# Patient Record
Sex: FEMALE | Race: WHITE | Employment: OTHER | URBAN - METROPOLITAN AREA
[De-identification: names, ages, dates, MRNs, and addresses within clinical notes are randomized per-mention and may not be internally consistent; named-entity substitution may affect disease eponyms.]

---

## 2018-03-13 ENCOUNTER — TELEPHONE (OUTPATIENT)
Dept: ADMINISTRATIVE | Age: 78
End: 2018-03-13

## 2018-03-13 ENCOUNTER — OFFICE VISIT (OUTPATIENT)
Dept: FAMILY MEDICINE CLINIC | Age: 78
End: 2018-03-13
Payer: MEDICARE

## 2018-03-13 VITALS
TEMPERATURE: 99 F | RESPIRATION RATE: 16 BRPM | HEART RATE: 72 BPM | SYSTOLIC BLOOD PRESSURE: 139 MMHG | DIASTOLIC BLOOD PRESSURE: 88 MMHG

## 2018-03-13 DIAGNOSIS — J06.9 VIRAL URI: Primary | ICD-10-CM

## 2018-03-13 PROCEDURE — 99213 OFFICE O/P EST LOW 20 MIN: CPT | Performed by: FAMILY MEDICINE

## 2018-03-13 RX ORDER — NAPROXEN 250 MG/1
250 TABLET ORAL 2 TIMES DAILY WITH MEALS
Qty: 30 TABLET | Refills: 1 | Status: SHIPPED | OUTPATIENT
Start: 2018-03-13 | End: 2018-07-23

## 2018-03-13 ASSESSMENT — PATIENT HEALTH QUESTIONNAIRE - PHQ9
SUM OF ALL RESPONSES TO PHQ9 QUESTIONS 1 & 2: 0
1. LITTLE INTEREST OR PLEASURE IN DOING THINGS: 0
SUM OF ALL RESPONSES TO PHQ QUESTIONS 1-9: 0
2. FEELING DOWN, DEPRESSED OR HOPELESS: 0

## 2018-03-13 NOTE — TELEPHONE ENCOUNTER
Patient was told to call back, she is not able to make it there for the 3pm opening today; sinus infection, referred to [facility] - she will go tomorrow

## 2018-06-06 ENCOUNTER — HOSPITAL ENCOUNTER (OUTPATIENT)
Age: 78
Discharge: HOME | End: 2018-06-06
Payer: MEDICARE

## 2018-06-06 ENCOUNTER — TELEPHONE (OUTPATIENT)
Dept: FAMILY MEDICINE CLINIC | Age: 78
End: 2018-06-06

## 2018-06-06 DIAGNOSIS — Z12.31 SCREENING MAMMOGRAM, ENCOUNTER FOR: Primary | ICD-10-CM

## 2018-06-06 LAB
ALBUMIN SERPL-MCNC: 4.2 G/DL (ref 3.5–5.2)
ALP BLD-CCNC: 91 U/L (ref 35–104)
ALT SERPL-CCNC: 14 U/L (ref 0–32)
ANION GAP SERPL CALCULATED.3IONS-SCNC: 10 MMOL/L (ref 7–16)
AST SERPL-CCNC: 21 U/L (ref 0–31)
BILIRUB SERPL-MCNC: 0.5 MG/DL (ref 0–1.2)
BUN BLDV-MCNC: 11 MG/DL (ref 8–23)
CALCIUM SERPL-MCNC: 9.4 MG/DL (ref 8.6–10.2)
CHLORIDE BLD-SCNC: 102 MMOL/L (ref 98–107)
CO2: 27 MMOL/L (ref 22–29)
CREAT SERPL-MCNC: 0.6 MG/DL (ref 0.5–1)
GFR AFRICAN AMERICAN: >60
GFR NON-AFRICAN AMERICAN: >60 ML/MIN/1.73
GLUCOSE BLD-MCNC: 124 MG/DL (ref 74–109)
HBA1C MFR BLD: 6.1 % (ref 4.8–5.9)
POTASSIUM SERPL-SCNC: 4 MMOL/L (ref 3.5–5)
SODIUM BLD-SCNC: 139 MMOL/L (ref 132–146)
TOTAL PROTEIN: 7.7 G/DL (ref 6.4–8.3)

## 2018-06-06 PROCEDURE — 83036 HEMOGLOBIN GLYCOSYLATED A1C: CPT

## 2018-06-06 PROCEDURE — 80053 COMPREHEN METABOLIC PANEL: CPT

## 2018-06-06 PROCEDURE — 36415 COLL VENOUS BLD VENIPUNCTURE: CPT

## 2018-06-27 ENCOUNTER — HOSPITAL ENCOUNTER (OUTPATIENT)
Dept: MAMMOGRAPHY | Age: 78
Discharge: HOME | End: 2018-06-29
Payer: MEDICARE

## 2018-06-27 ENCOUNTER — HOSPITAL ENCOUNTER (OUTPATIENT)
Age: 78
Discharge: HOME | End: 2018-06-29
Payer: MEDICARE

## 2018-06-27 DIAGNOSIS — Z12.31 SCREENING MAMMOGRAM, ENCOUNTER FOR: ICD-10-CM

## 2018-06-27 PROCEDURE — 77067 SCR MAMMO BI INCL CAD: CPT

## 2018-07-23 ENCOUNTER — OFFICE VISIT (OUTPATIENT)
Dept: FAMILY MEDICINE CLINIC | Age: 78
End: 2018-07-23
Payer: MEDICARE

## 2018-07-23 VITALS
BODY MASS INDEX: 32.44 KG/M2 | RESPIRATION RATE: 16 BRPM | HEART RATE: 76 BPM | SYSTOLIC BLOOD PRESSURE: 124 MMHG | DIASTOLIC BLOOD PRESSURE: 65 MMHG | HEIGHT: 64 IN | WEIGHT: 190 LBS

## 2018-07-23 DIAGNOSIS — E11.9 TYPE 2 DIABETES MELLITUS WITHOUT COMPLICATION, WITHOUT LONG-TERM CURRENT USE OF INSULIN (HCC): Primary | ICD-10-CM

## 2018-07-23 DIAGNOSIS — I10 ESSENTIAL HYPERTENSION: Chronic | ICD-10-CM

## 2018-07-23 DIAGNOSIS — E78.2 MIXED HYPERLIPIDEMIA: Chronic | ICD-10-CM

## 2018-07-23 PROCEDURE — 1036F TOBACCO NON-USER: CPT | Performed by: FAMILY MEDICINE

## 2018-07-23 PROCEDURE — 1101F PT FALLS ASSESS-DOCD LE1/YR: CPT | Performed by: FAMILY MEDICINE

## 2018-07-23 PROCEDURE — 1090F PRES/ABSN URINE INCON ASSESS: CPT | Performed by: FAMILY MEDICINE

## 2018-07-23 PROCEDURE — 1123F ACP DISCUSS/DSCN MKR DOCD: CPT | Performed by: FAMILY MEDICINE

## 2018-07-23 PROCEDURE — G8400 PT W/DXA NO RESULTS DOC: HCPCS | Performed by: FAMILY MEDICINE

## 2018-07-23 PROCEDURE — 4040F PNEUMOC VAC/ADMIN/RCVD: CPT | Performed by: FAMILY MEDICINE

## 2018-07-23 PROCEDURE — 99214 OFFICE O/P EST MOD 30 MIN: CPT | Performed by: FAMILY MEDICINE

## 2018-07-23 PROCEDURE — G8427 DOCREV CUR MEDS BY ELIG CLIN: HCPCS | Performed by: FAMILY MEDICINE

## 2018-07-23 PROCEDURE — G8417 CALC BMI ABV UP PARAM F/U: HCPCS | Performed by: FAMILY MEDICINE

## 2018-07-23 RX ORDER — LOSARTAN POTASSIUM AND HYDROCHLOROTHIAZIDE 12.5; 1 MG/1; MG/1
1 TABLET ORAL DAILY
Qty: 90 TABLET | Refills: 1 | Status: SHIPPED | OUTPATIENT
Start: 2018-07-23 | End: 2018-12-24 | Stop reason: SDUPTHER

## 2018-07-23 ASSESSMENT — PATIENT HEALTH QUESTIONNAIRE - PHQ9
1. LITTLE INTEREST OR PLEASURE IN DOING THINGS: 0
2. FEELING DOWN, DEPRESSED OR HOPELESS: 0
SUM OF ALL RESPONSES TO PHQ9 QUESTIONS 1 & 2: 0
SUM OF ALL RESPONSES TO PHQ QUESTIONS 1-9: 0

## 2018-07-23 NOTE — PROGRESS NOTES
Chief complaint : Daisy Edouard  presents for follow-up of diabetes type 2 , hypertension and hyperlipidemia. Present illness :    Generally feels well. Patient here for scheduled follow-up visit. Diabetes type 2 : no polyuria or polydipsia. No visual changes. No pain or numbness and no unintended weight loss. Taking medications regularly. Glucose levels at home : Not checking  Last eye exam : Done last year    Hypertension : No headaches. No exertional chest pain, dyspnea or edema. Taking medications regularly. Trying to avoid excessive salt intake. reports that she does not drink alcohol. Exercise : Does not exercise regularly. Hyperlipidemia : no myalgias or muscle weakness. Diet : Not watching carefully and gained some weight recently. Medications : As listed    Recent labs reviewed and discussed with patient.     Other concerns : None    Past Medical History:   Diagnosis Date    Arthritis     Bilateral carpal tunnel syndrome     right more than left    Diabetes mellitus (HCC)     diet controlled & cinnamon    DM2 (diabetes mellitus, type 2) ([facility] 2/16/2012    patient states not diabetic, family hx of diabetes; A1C-6.2    Endometrial cancer ([facility] [address] 1992    early small cured    GERD (gastroesophageal reflux disease)     HTN (hypertension) 2/16/2012    Hyperlipidemia     borderline- no meds    Hypertension     controlled    Obesity 5/28/2013    Osteoarthritis     Osteoarthritis of right knee     [place]    Sinus drainage     seasonal (winter)    Spondylosis of lumbosacral joint     Voice absence     only with anxiety       Past Surgical History:   Procedure Laterality Date    COLONOSCOPY  [address]    1 hyperplastic polyp 2mm    HYSTERECTOMY  1990's    [facility] BSO    JOINT REPLACEMENT Right     Knee    OTHER SURGICAL HISTORY  3/11/2015    EGD with Biopsy    TONSILLECTOMY      age 13   [address]  2/2012    right    UPPER GASTROINTESTINAL ENDOSCOPY  08

## 2018-11-06 ENCOUNTER — APPOINTMENT (OUTPATIENT)
Dept: GENERAL RADIOLOGY | Age: 78
End: 2018-11-06
Payer: MEDICARE

## 2018-11-06 ENCOUNTER — HOSPITAL ENCOUNTER (EMERGENCY)
Age: 78
Discharge: HOME | End: 2018-11-06
Payer: MEDICARE

## 2018-11-06 VITALS
BODY MASS INDEX: 31.58 KG/M2 | RESPIRATION RATE: 14 BRPM | WEIGHT: 185 LBS | OXYGEN SATURATION: 97 % | HEART RATE: 68 BPM | HEIGHT: 64 IN | TEMPERATURE: 98.6 F | DIASTOLIC BLOOD PRESSURE: 82 MMHG | SYSTOLIC BLOOD PRESSURE: 196 MMHG
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
## 2018-11-06 DIAGNOSIS — S80.01XA CONTUSION OF RIGHT KNEE, INITIAL ENCOUNTER: ICD-10-CM

## 2018-11-06 DIAGNOSIS — S40.021A CONTUSION OF RIGHT UPPER EXTREMITY, INITIAL ENCOUNTER: ICD-10-CM

## 2018-11-06 PROCEDURE — 73562 X-RAY EXAM OF KNEE 3: CPT

## 2018-11-06 PROCEDURE — 6370000000 HC RX 637 (ALT 250 FOR IP)

## 2018-11-06 PROCEDURE — 73060 X-RAY EXAM OF HUMERUS: CPT

## 2018-11-06 PROCEDURE — 73030 X-RAY EXAM OF SHOULDER: CPT

## 2018-11-06 PROCEDURE — 99284 EMERGENCY DEPT VISIT MOD MDM: CPT

## 2018-11-06 RX ORDER — NAPROXEN 250 MG/1
TABLET ORAL
Status: COMPLETED
Start: 2018-11-06 | End: 2018-11-06

## 2018-11-06 RX ORDER — NAPROXEN 250 MG/1
500 TABLET ORAL ONCE
Status: COMPLETED | OUTPATIENT
Start: 2018-11-06 | End: 2018-11-06

## 2018-11-06 RX ADMIN — NAPROXEN 500 MG: 250 TABLET ORAL at 10:14

## 2018-11-06 ASSESSMENT — PAIN DESCRIPTION - LOCATION: LOCATION: ARM;KNEE

## 2018-11-06 ASSESSMENT — PAIN DESCRIPTION - PAIN TYPE: TYPE: ACUTE PAIN

## 2018-11-06 ASSESSMENT — PAIN DESCRIPTION - ORIENTATION: ORIENTATION: RIGHT

## 2018-11-06 ASSESSMENT — PAIN SCALES - GENERAL: PAINLEVEL_OUTOF10: 10

## 2018-11-06 NOTE — ED NOTES
Pt placed in sling. Workers comp papers discussed with patialvaradon and copies retained for our records       Savanna Valentine.  Synetta Number, RN  11/06/18 [id number]

## 2018-11-06 NOTE — ED PROVIDER NOTES
Heart Disease in her brother and father. The patients home medications have been reviewed. Allergies: Penicillins and Aspirin    -------------------------------------------------- RESULTS -------------------------------------------------  All laboratory and radiology results have been personally reviewed by myself   LABS:  No results found for this visit on 11/06/18. RADIOLOGY:  Interpreted by Radiologist.  XR HUMERUS RIGHT (MIN 2 VIEWS)   Final Result   1. No acute fracture or dislocation. XR SHOULDER RIGHT (MIN 2 VIEWS)   Final Result   1. No acute fracture or dislocation. 2. Mild to moderate degenerative joint disease of the   acromioclavicular and glenohumeral joints. XR KNEE RIGHT (3 VIEWS)   Final Result   1. No acute fracture or dislocation. 2. Total right knee arthroplasty in near anatomic alignment. 3. Diffuse soft tissue swelling anteriorly. ------------------------- NURSING NOTES AND VITALS REVIEWED ---------------------------   The nursing notes within the ED encounter and vital signs as below have been reviewed. BP (!) 196/82   Pulse 68   Temp 98.6 °F (37 °C) (Oral)   Resp 14   Ht 5' 4\" (1.626 m)   Wt 185 lb (83.9 kg)   SpO2 97%   BMI 31.76 kg/m²   Oxygen Saturation Interpretation: Normal      ---------------------------------------------------PHYSICAL EXAM--------------------------------------      Constitutional/General: Alert and oriented x3, well appearing, non toxic in NAD  Head: NC/AT  Eyes: PERRL, EOMI, 3 mm and reactive. No nystagmus. Mouth: Oropharynx clear, handling secretions, no trismus  Neck: Supple, full ROM, no meningeal signs, No tenderness on palpation of midline cervical spine. Pulmonary: Lungs clear to auscultation bilaterally, no wheezes, rales, or rhonchi. Not in respiratory distress, no tenderness on palpation to the anterior lateral chest wall area. Cardiovascular:  Regular rate and rhythm, no murmurs, gallops, or rubs.  2+

## 2018-11-08 ENCOUNTER — OFFICE VISIT (OUTPATIENT)
Dept: FAMILY MEDICINE CLINIC | Age: 78
End: 2018-11-08
Payer: MEDICARE

## 2018-11-08 VITALS
HEIGHT: 64 IN | BODY MASS INDEX: 33.46 KG/M2 | HEART RATE: 69 BPM | DIASTOLIC BLOOD PRESSURE: 72 MMHG | SYSTOLIC BLOOD PRESSURE: 136 MMHG | WEIGHT: 196 LBS | RESPIRATION RATE: 18 BRPM

## 2018-11-08 DIAGNOSIS — M25.511 ACUTE PAIN OF RIGHT SHOULDER: Primary | ICD-10-CM

## 2018-11-08 PROCEDURE — G8400 PT W/DXA NO RESULTS DOC: HCPCS | Performed by: FAMILY MEDICINE

## 2018-11-08 PROCEDURE — 1036F TOBACCO NON-USER: CPT | Performed by: FAMILY MEDICINE

## 2018-11-08 PROCEDURE — G8417 CALC BMI ABV UP PARAM F/U: HCPCS | Performed by: FAMILY MEDICINE

## 2018-11-08 PROCEDURE — 1090F PRES/ABSN URINE INCON ASSESS: CPT | Performed by: FAMILY MEDICINE

## 2018-11-08 PROCEDURE — 4040F PNEUMOC VAC/ADMIN/RCVD: CPT | Performed by: FAMILY MEDICINE

## 2018-11-08 PROCEDURE — G8427 DOCREV CUR MEDS BY ELIG CLIN: HCPCS | Performed by: FAMILY MEDICINE

## 2018-11-08 PROCEDURE — G8482 FLU IMMUNIZE ORDER/ADMIN: HCPCS | Performed by: FAMILY MEDICINE

## 2018-11-08 PROCEDURE — 1123F ACP DISCUSS/DSCN MKR DOCD: CPT | Performed by: FAMILY MEDICINE

## 2018-11-08 PROCEDURE — 1101F PT FALLS ASSESS-DOCD LE1/YR: CPT | Performed by: FAMILY MEDICINE

## 2018-11-08 PROCEDURE — 99213 OFFICE O/P EST LOW 20 MIN: CPT | Performed by: FAMILY MEDICINE

## 2018-11-08 RX ORDER — NAPROXEN 250 MG/1
250 TABLET ORAL 2 TIMES DAILY WITH MEALS
Qty: 30 TABLET | Refills: 1 | Status: SHIPPED | OUTPATIENT
Start: 2018-11-08 | End: 2018-12-31 | Stop reason: SDUPTHER

## 2018-11-08 ASSESSMENT — PATIENT HEALTH QUESTIONNAIRE - PHQ9
SUM OF ALL RESPONSES TO PHQ QUESTIONS 1-9: 0
2. FEELING DOWN, DEPRESSED OR HOPELESS: 0
1. LITTLE INTEREST OR PLEASURE IN DOING THINGS: 0
SUM OF ALL RESPONSES TO PHQ9 QUESTIONS 1 & 2: 0
SUM OF ALL RESPONSES TO PHQ QUESTIONS 1-9: 0

## 2018-11-09 ENCOUNTER — NURSE ONLY (OUTPATIENT)
Dept: FAMILY MEDICINE CLINIC | Age: 78
End: 2018-11-09
Payer: MEDICARE

## 2018-11-09 DIAGNOSIS — M25.511 RIGHT SHOULDER PAIN, UNSPECIFIED CHRONICITY: Primary | ICD-10-CM

## 2018-11-09 PROCEDURE — 96372 THER/PROPH/DIAG INJ SC/IM: CPT | Performed by: FAMILY MEDICINE

## 2018-11-09 RX ORDER — KETOROLAC TROMETHAMINE 30 MG/ML
30 INJECTION, SOLUTION INTRAMUSCULAR; INTRAVENOUS ONCE
Status: COMPLETED | OUTPATIENT
Start: 2018-11-09 | End: 2018-11-09

## 2018-11-09 RX ADMIN — KETOROLAC TROMETHAMINE 30 MG: 30 INJECTION, SOLUTION INTRAMUSCULAR; INTRAVENOUS at 13:21

## 2018-12-31 DIAGNOSIS — M25.511 ACUTE PAIN OF RIGHT SHOULDER: ICD-10-CM

## 2019-01-01 RX ORDER — NAPROXEN 250 MG/1
TABLET ORAL
Qty: 30 TABLET | Refills: 1 | Status: SHIPPED | OUTPATIENT
Start: 2019-01-01 | End: 2019-03-14 | Stop reason: SDUPTHER

## 2019-02-19 ENCOUNTER — HOSPITAL ENCOUNTER (OUTPATIENT)
Age: 79
Discharge: HOME | End: 2019-02-19
Payer: MEDICARE

## 2019-02-19 DIAGNOSIS — E11.9 TYPE 2 DIABETES MELLITUS WITHOUT COMPLICATION, WITHOUT LONG-TERM CURRENT USE OF INSULIN (HCC): ICD-10-CM

## 2019-02-19 LAB
ALBUMIN SERPL-MCNC: 4.2 G/DL (ref 3.5–5.2)
ALP BLD-CCNC: 88 U/L (ref 35–104)
ALT SERPL-CCNC: 14 U/L (ref 0–32)
ANION GAP SERPL CALCULATED.3IONS-SCNC: 12 MMOL/L (ref 7–16)
AST SERPL-CCNC: 19 U/L (ref 0–31)
BILIRUB SERPL-MCNC: 0.6 MG/DL (ref 0–1.2)
BUN BLDV-MCNC: 13 MG/DL (ref 8–23)
CALCIUM SERPL-MCNC: 9.5 MG/DL (ref 8.6–10.2)
CHLORIDE BLD-SCNC: 99 MMOL/L (ref 98–107)
CHOLESTEROL, TOTAL: 207 MG/DL (ref 0–199)
CO2: 24 MMOL/L (ref 22–29)
CREAT SERPL-MCNC: 0.7 MG/DL (ref 0.5–1)
CREATININE URINE: 41 MG/DL (ref 29–226)
GFR AFRICAN AMERICAN: >60
GFR NON-AFRICAN AMERICAN: >60 ML/MIN/1.73
GLUCOSE BLD-MCNC: 127 MG/DL (ref 74–99)
HBA1C MFR BLD: 5.9 % (ref 4–5.6)
HDLC SERPL-MCNC: 42 MG/DL
LDL CHOLESTEROL CALCULATED: 136 MG/DL (ref 0–99)
MICROALBUMIN UR-MCNC: <12 MG/L
MICROALBUMIN/CREAT UR-RTO: ABNORMAL (ref 0–30)
POTASSIUM SERPL-SCNC: 4.1 MMOL/L (ref 3.5–5)
SODIUM BLD-SCNC: 135 MMOL/L (ref 132–146)
TOTAL PROTEIN: 8 G/DL (ref 6.4–8.3)
TRIGL SERPL-MCNC: 147 MG/DL (ref 0–149)
VLDLC SERPL CALC-MCNC: 29 MG/DL

## 2019-02-19 PROCEDURE — 82044 UR ALBUMIN SEMIQUANTITATIVE: CPT

## 2019-02-19 PROCEDURE — 80053 COMPREHEN METABOLIC PANEL: CPT

## 2019-02-19 PROCEDURE — 83036 HEMOGLOBIN GLYCOSYLATED A1C: CPT

## 2019-02-19 PROCEDURE — 82570 ASSAY OF URINE CREATININE: CPT

## 2019-02-19 PROCEDURE — 80061 LIPID PANEL: CPT

## 2019-02-19 PROCEDURE — 36415 COLL VENOUS BLD VENIPUNCTURE: CPT

## 2019-02-26 ENCOUNTER — OFFICE VISIT (OUTPATIENT)
Dept: FAMILY MEDICINE CLINIC | Age: 79
End: 2019-02-26
Payer: MEDICARE

## 2019-02-26 VITALS
HEART RATE: 98 BPM | BODY MASS INDEX: 32.95 KG/M2 | DIASTOLIC BLOOD PRESSURE: 85 MMHG | WEIGHT: 193 LBS | SYSTOLIC BLOOD PRESSURE: 169 MMHG | RESPIRATION RATE: 18 BRPM | HEIGHT: 64 IN

## 2019-02-26 DIAGNOSIS — E78.2 MIXED HYPERLIPIDEMIA: Chronic | ICD-10-CM

## 2019-02-26 DIAGNOSIS — E11.9 TYPE 2 DIABETES MELLITUS WITHOUT COMPLICATION, WITHOUT LONG-TERM CURRENT USE OF INSULIN (HCC): Primary | ICD-10-CM

## 2019-02-26 DIAGNOSIS — I10 ESSENTIAL HYPERTENSION: Chronic | ICD-10-CM

## 2019-02-26 DIAGNOSIS — K21.9 GASTROESOPHAGEAL REFLUX DISEASE WITHOUT ESOPHAGITIS: ICD-10-CM

## 2019-02-26 PROCEDURE — 1101F PT FALLS ASSESS-DOCD LE1/YR: CPT | Performed by: FAMILY MEDICINE

## 2019-02-26 PROCEDURE — 99214 OFFICE O/P EST MOD 30 MIN: CPT | Performed by: FAMILY MEDICINE

## 2019-02-26 PROCEDURE — 1036F TOBACCO NON-USER: CPT | Performed by: FAMILY MEDICINE

## 2019-02-26 PROCEDURE — G8417 CALC BMI ABV UP PARAM F/U: HCPCS | Performed by: FAMILY MEDICINE

## 2019-02-26 PROCEDURE — 1090F PRES/ABSN URINE INCON ASSESS: CPT | Performed by: FAMILY MEDICINE

## 2019-02-26 PROCEDURE — G8482 FLU IMMUNIZE ORDER/ADMIN: HCPCS | Performed by: FAMILY MEDICINE

## 2019-02-26 PROCEDURE — G8400 PT W/DXA NO RESULTS DOC: HCPCS | Performed by: FAMILY MEDICINE

## 2019-02-26 PROCEDURE — G8427 DOCREV CUR MEDS BY ELIG CLIN: HCPCS | Performed by: FAMILY MEDICINE

## 2019-02-26 PROCEDURE — 4040F PNEUMOC VAC/ADMIN/RCVD: CPT | Performed by: FAMILY MEDICINE

## 2019-02-26 PROCEDURE — 1123F ACP DISCUSS/DSCN MKR DOCD: CPT | Performed by: FAMILY MEDICINE

## 2019-02-26 RX ORDER — AMLODIPINE BESYLATE 2.5 MG/1
2.5 TABLET ORAL DAILY
Qty: 30 TABLET | Refills: 3 | Status: SHIPPED | OUTPATIENT
Start: 2019-02-26 | End: 2019-04-30 | Stop reason: ALTCHOICE

## 2019-02-26 RX ORDER — LISINOPRIL AND HYDROCHLOROTHIAZIDE 20; 12.5 MG/1; MG/1
1 TABLET ORAL DAILY
Qty: 30 TABLET | Refills: 5 | Status: SHIPPED | OUTPATIENT
Start: 2019-02-26 | End: 2019-04-30 | Stop reason: ALTCHOICE

## 2019-02-26 ASSESSMENT — PATIENT HEALTH QUESTIONNAIRE - PHQ9
SUM OF ALL RESPONSES TO PHQ QUESTIONS 1-9: 0
SUM OF ALL RESPONSES TO PHQ9 QUESTIONS 1 & 2: 0
SUM OF ALL RESPONSES TO PHQ QUESTIONS 1-9: 0
1. LITTLE INTEREST OR PLEASURE IN DOING THINGS: 0
2. FEELING DOWN, DEPRESSED OR HOPELESS: 0

## 2019-03-18 ENCOUNTER — OFFICE VISIT (OUTPATIENT)
Dept: FAMILY MEDICINE CLINIC | Age: 79
End: 2019-03-18
Payer: MEDICARE

## 2019-03-18 VITALS
DIASTOLIC BLOOD PRESSURE: 77 MMHG | WEIGHT: 193 LBS | HEART RATE: 65 BPM | HEIGHT: 64 IN | SYSTOLIC BLOOD PRESSURE: 135 MMHG | RESPIRATION RATE: 16 BRPM | BODY MASS INDEX: 32.95 KG/M2 | OXYGEN SATURATION: 98 %

## 2019-03-18 DIAGNOSIS — L30.8 OTHER ECZEMA: Primary | ICD-10-CM

## 2019-03-18 PROCEDURE — G8427 DOCREV CUR MEDS BY ELIG CLIN: HCPCS | Performed by: FAMILY MEDICINE

## 2019-03-18 PROCEDURE — 1036F TOBACCO NON-USER: CPT | Performed by: FAMILY MEDICINE

## 2019-03-18 PROCEDURE — G8482 FLU IMMUNIZE ORDER/ADMIN: HCPCS | Performed by: FAMILY MEDICINE

## 2019-03-18 PROCEDURE — G8400 PT W/DXA NO RESULTS DOC: HCPCS | Performed by: FAMILY MEDICINE

## 2019-03-18 PROCEDURE — 4040F PNEUMOC VAC/ADMIN/RCVD: CPT | Performed by: FAMILY MEDICINE

## 2019-03-18 PROCEDURE — 1090F PRES/ABSN URINE INCON ASSESS: CPT | Performed by: FAMILY MEDICINE

## 2019-03-18 PROCEDURE — 1101F PT FALLS ASSESS-DOCD LE1/YR: CPT | Performed by: FAMILY MEDICINE

## 2019-03-18 PROCEDURE — G8417 CALC BMI ABV UP PARAM F/U: HCPCS | Performed by: FAMILY MEDICINE

## 2019-03-18 PROCEDURE — 99213 OFFICE O/P EST LOW 20 MIN: CPT | Performed by: FAMILY MEDICINE

## 2019-03-18 PROCEDURE — 1123F ACP DISCUSS/DSCN MKR DOCD: CPT | Performed by: FAMILY MEDICINE

## 2019-03-18 RX ORDER — TRIAMCINOLONE ACETONIDE OINTMENT USP, 0.05% 0.5 MG/G
OINTMENT TOPICAL 2 TIMES DAILY
Qty: 17 G | Refills: 1 | Status: SHIPPED | OUTPATIENT
Start: 2019-03-18 | End: 2019-03-19 | Stop reason: CLARIF

## 2019-04-30 ENCOUNTER — TELEPHONE (OUTPATIENT)
Dept: FAMILY MEDICINE CLINIC | Age: 79
End: 2019-04-30

## 2019-04-30 ENCOUNTER — OFFICE VISIT (OUTPATIENT)
Dept: FAMILY MEDICINE CLINIC | Age: 79
End: 2019-04-30
Payer: MEDICARE

## 2019-04-30 VITALS
HEART RATE: 85 BPM | BODY MASS INDEX: 33.12 KG/M2 | RESPIRATION RATE: 18 BRPM | DIASTOLIC BLOOD PRESSURE: 88 MMHG | WEIGHT: 194 LBS | HEIGHT: 64 IN | SYSTOLIC BLOOD PRESSURE: 151 MMHG

## 2019-04-30 DIAGNOSIS — R13.19 ESOPHAGEAL DYSPHAGIA: ICD-10-CM

## 2019-04-30 DIAGNOSIS — E78.2 MIXED HYPERLIPIDEMIA: Chronic | ICD-10-CM

## 2019-04-30 DIAGNOSIS — I10 ESSENTIAL HYPERTENSION: Primary | Chronic | ICD-10-CM

## 2019-04-30 DIAGNOSIS — E11.9 TYPE 2 DIABETES MELLITUS WITHOUT COMPLICATION, WITHOUT LONG-TERM CURRENT USE OF INSULIN (HCC): ICD-10-CM

## 2019-04-30 PROCEDURE — G8400 PT W/DXA NO RESULTS DOC: HCPCS | Performed by: FAMILY MEDICINE

## 2019-04-30 PROCEDURE — G8427 DOCREV CUR MEDS BY ELIG CLIN: HCPCS | Performed by: FAMILY MEDICINE

## 2019-04-30 PROCEDURE — 4040F PNEUMOC VAC/ADMIN/RCVD: CPT | Performed by: FAMILY MEDICINE

## 2019-04-30 PROCEDURE — G8417 CALC BMI ABV UP PARAM F/U: HCPCS | Performed by: FAMILY MEDICINE

## 2019-04-30 PROCEDURE — 1090F PRES/ABSN URINE INCON ASSESS: CPT | Performed by: FAMILY MEDICINE

## 2019-04-30 PROCEDURE — 1036F TOBACCO NON-USER: CPT | Performed by: FAMILY MEDICINE

## 2019-04-30 PROCEDURE — 1123F ACP DISCUSS/DSCN MKR DOCD: CPT | Performed by: FAMILY MEDICINE

## 2019-04-30 PROCEDURE — 99214 OFFICE O/P EST MOD 30 MIN: CPT | Performed by: FAMILY MEDICINE

## 2019-04-30 RX ORDER — LISINOPRIL AND HYDROCHLOROTHIAZIDE 25; 20 MG/1; MG/1
1 TABLET ORAL DAILY
Qty: 30 TABLET | Refills: 2 | Status: SHIPPED | OUTPATIENT
Start: 2019-04-30 | End: 2019-05-16 | Stop reason: ALTCHOICE

## 2019-04-30 RX ORDER — AMLODIPINE BESYLATE 2.5 MG/1
2.5 TABLET ORAL DAILY
COMMUNITY
End: 2019-05-16 | Stop reason: SDUPTHER

## 2019-04-30 RX ORDER — OMEPRAZOLE 40 MG/1
40 CAPSULE, DELAYED RELEASE ORAL
Qty: 90 CAPSULE | Refills: 1 | Status: SHIPPED | OUTPATIENT
Start: 2019-04-30 | End: 2019-06-11

## 2019-04-30 ASSESSMENT — PATIENT HEALTH QUESTIONNAIRE - PHQ9
1. LITTLE INTEREST OR PLEASURE IN DOING THINGS: 0
2. FEELING DOWN, DEPRESSED OR HOPELESS: 0
SUM OF ALL RESPONSES TO PHQ9 QUESTIONS 1 & 2: 0
SUM OF ALL RESPONSES TO PHQ QUESTIONS 1-9: 0
SUM OF ALL RESPONSES TO PHQ QUESTIONS 1-9: 0

## 2019-04-30 NOTE — PROGRESS NOTES
Chief complaint : Minnie Winkler  presents for follow-up of hypertension. Present illness :    Doing well. Complains of dysphagia and GERD symptoms  No headaches, exertional chest pain or edema. No visual changes. No dyspnea or cough. No lightheadedness or dizziness. No syncopal episodes. No abdominal pain or change in bowel habits. No intermittent claudication. Taking medications regularly. Exercise : Does not  Low salt diet : Yes  Recent labs reviewed and discussed with patient. Other concerns : No other concerns.     Past Medical History:   Diagnosis Date    Arthritis     Bilateral carpal tunnel syndrome     right more than left    Diabetes mellitus ([address]     diet controlled & cinnamon    DM2 (diabetes mellitus, type 2) ([address] 2/16/2012    patient states not diabetic, family hx of diabetes; A1C-6.2    Endometrial cancer ([facility] [address] 1992    early small cured    GERD (gastroesophageal reflux disease)     HTN (hypertension) 2/16/2012    Hyperlipidemia     borderline- no meds    Hypertension     controlled    Obesity 5/28/2013    Osteoarthritis     Osteoarthritis of right knee     [place]    Sinus drainage     seasonal (winter)    Spondylosis of lumbosacral joint     Voice absence     only with anxiety       Past Surgical History:   Procedure Laterality Date    COLONOSCOPY  [address]    1 hyperplastic polyp 2mm    HYSTERECTOMY  1990's    [facility] BSO    JOINT REPLACEMENT Right     Knee    OTHER SURGICAL HISTORY  3/11/2015    EGD with Biopsy    TONSILLECTOMY      age 13   Stacie Lighter TOTAL KNEE ARTHROPLASTY  2/2012    right    UPPER GASTROINTESTINAL ENDOSCOPY  08       Current Outpatient Medications   Medication Sig Dispense Refill    naproxen (NAPROSYN) 250 MG tablet Take 1 tablet by mouth 2 times daily as needed for Pain 30 tablet 1    lisinopril-hydrochlorothiazide (PRINZIDE;ZESTORETIC) 20-12.5 MG per tablet Take 1 tablet by mouth daily 30 tablet 5    amLODIPine (NORVASC) 2.5 MG tablet Take 1  HDL 02/19/2019 42     LDL Calculated 02/19/2019 136*    VLDL Cholesterol Calcula* 02/19/2019 29     Hemoglobin A1C 02/19/2019 5.9*    Sodium 02/19/2019 135     Potassium 02/19/2019 4.1     Chloride 02/19/2019 99     CO2 02/19/2019 24     Anion Gap 02/19/2019 12     Glucose 02/19/2019 127*    BUN 02/19/2019 13     CREATININE 02/19/2019 0.7     GFR Non- 02/19/2019 >60     GFR  02/19/2019 >60     Calcium 02/19/2019 9.5     Total Protein 02/19/2019 8.0     Alb 02/19/2019 4.2     Total Bilirubin 02/19/2019 0.6     Alkaline Phosphatase 02/19/2019 88     ALT 02/19/2019 14     AST 02/19/2019 19        Assessment / Plan  1. Essential hypertension  BP was high at last visit  Losartan was stopped  Patient started on lisinopril HCT and amlodipine 2.5 mg daily. BP at home usually 130/80  Increase lisinopril HCT to 20/25    2. Mixed hyperlipidemia  Controlled. Continue same    3. Dysphagia  Schedule esophagram    4. GERD  Will likely need EGD  Send script for omeprazole  Follow up in 6 weeks          Encouraged to take medication(s) regularly, follow a low salt diet, limit alcohol intake, follow a low calorie diet and exercise regularly. Future labs ordered. Patient fully informed about the importance of close control of hypertension in order to minimize the risks of accelerated atherosclerosis, heart disease, stroke and renal disease. Encouraged to monitor blood pressure at home and record levels for next visit. Instructed about proper technique to measure blood pressure. Follow-up as scheduled. Schedule earlier appointment if home blood pressure is consistently elevated or if headaches or vision changes. All questions answered.

## 2019-05-16 ENCOUNTER — TELEPHONE (OUTPATIENT)
Dept: FAMILY MEDICINE CLINIC | Age: 79
End: 2019-05-16

## 2019-05-16 RX ORDER — AMLODIPINE BESYLATE 2.5 MG/1
2.5 TABLET ORAL DAILY
Qty: 90 TABLET | Refills: 1 | Status: SHIPPED | OUTPATIENT
Start: 2019-05-16 | End: 2019-12-06 | Stop reason: ALTCHOICE

## 2019-05-16 RX ORDER — LISINOPRIL AND HYDROCHLOROTHIAZIDE 20; 12.5 MG/1; MG/1
1 TABLET ORAL DAILY
Qty: 90 TABLET | Refills: 1 | Status: CANCELLED | OUTPATIENT
Start: 2019-05-16

## 2019-05-16 RX ORDER — LISINOPRIL AND HYDROCHLOROTHIAZIDE 20; 12.5 MG/1; MG/1
1 TABLET ORAL DAILY
Qty: 90 TABLET | Refills: 0 | Status: SHIPPED | OUTPATIENT
Start: 2019-05-16 | End: 2019-05-24 | Stop reason: ALTCHOICE

## 2019-05-16 RX ORDER — LISINOPRIL AND HYDROCHLOROTHIAZIDE 20; 12.5 MG/1; MG/1
1 TABLET ORAL DAILY
COMMUNITY
End: 2019-05-16 | Stop reason: SDUPTHER

## 2019-05-16 NOTE — TELEPHONE ENCOUNTER
Call patient  May resume previous dose of lisinopril  Monitor BP at home and keep a log  Follow up next month as scheduled. Thanks.

## 2019-05-16 NOTE — TELEPHONE ENCOUNTER
Patient called regarding the new increased dose of the Lisinopril_HCTZ. She stopped it due to severe leg cramping she was getting. She went back on the lower dose she had the 20-12.5. She would like to go back on this if possible. Also needs RF on Amlodipine. Please advise if ok to go back on lower dose or if she will need something else.  Would like to go to [facility].

## 2019-05-29 ENCOUNTER — HOSPITAL ENCOUNTER (OUTPATIENT)
Dept: GENERAL RADIOLOGY | Age: 79
Discharge: HOME | End: 2019-05-31
Payer: MEDICARE

## 2019-05-29 DIAGNOSIS — R13.19 ESOPHAGEAL DYSPHAGIA: ICD-10-CM

## 2019-05-29 PROCEDURE — 2500000003 HC RX 250 WO HCPCS: Performed by: RADIOLOGY

## 2019-05-29 PROCEDURE — 6370000000 HC RX 637 (ALT 250 FOR IP): Performed by: RADIOLOGY

## 2019-05-29 PROCEDURE — 74220 X-RAY XM ESOPHAGUS 1CNTRST: CPT

## 2019-05-29 RX ADMIN — BARIUM SULFATE 176 G: 960 POWDER, FOR SUSPENSION ORAL at 11:11

## 2019-05-29 RX ADMIN — BARIUM SULFATE 140 ML: 980 POWDER, FOR SUSPENSION ORAL at 11:11

## 2019-05-29 RX ADMIN — ANTACID/ANTIFLATULENT 1 EACH: 380; 550; 10; 10 GRANULE, EFFERVESCENT ORAL at 11:11

## 2019-06-10 NOTE — PROGRESS NOTES
ARTHROPLASTY  2/2012    right    UPPER GASTROINTESTINAL ENDOSCOPY  08       Current Outpatient Medications   Medication Sig Dispense Refill    lisinopril-hydrochlorothiazide (PRINZIDE;ZESTORETIC) 20-25 MG per tablet TAKE 1 TABLET BY MOUTH EVERY DAY 90 tablet 1    amLODIPine (NORVASC) 2.5 MG tablet Take 1 tablet by mouth daily 90 tablet 1    omeprazole (PRILOSEC) 40 MG delayed release capsule Take 1 capsule by mouth every morning (before breakfast) 90 capsule 1    naproxen (NAPROSYN) 250 MG tablet Take 1 tablet by mouth 2 times daily as needed for Pain 30 tablet 1    Multiple Vitamin (MULTIVITAMIN PO) Take  by mouth. LD 3/6/15       No current facility-administered medications for this visit. Family History   Problem Relation Age of Onset    Diabetes Mother     Heart Disease Brother     Heart Disease Father        Allergies :  Penicillins and Aspirin      Review of systems :    No headaches, no visual disturbances. No weakness, no numbness. No depression, no anxiety. No rhinorrhea. No sore throat, no hearing loss. No cough, no dyspnea. No chest pain on exertion. No edema. No intermittent claudication. No abdominal pain. nausea, no change in bowel habits. No joint pain, no swelling. No myalgias . No foot pain or sores. No skin lesions, no rash. No heat or cold intolerance. No weight changes. No flank pain, no hematuria. No genital lesions or abnormalities. Physical examination :  Blood pressure 135/83, pulse 75, resp. rate 16, height 5' 4\" (1.626 m), weight 193 lb (87.5 kg). GA: alert oriented x 3, no distress. HEENT : unremarkable. Neck : supple, no masses. Thyroid : no goiter. Lymph nodes : No cervical or supraclavicular lymphadenopathy. Carotids : no  bruits. Chest : no deformities. Lungs : clear, no wheezing or crackles. No consolidation. Heart : regular rate and rhythm, normal S1S2 . No murmurs. Abdomen : soft, no masses. No hepatomegaly  or splenomegaly.  No bruits. Extremities : no edema. Normal peripheral pulses. Skin : no suspicious lesions or rash. Musculoskeletal : no gross deformities. Feet : no lesions or deformities. Normal sensations to touch, monofilament, cold and vibration. Neurological : normal gait and balance. Normal speech. No focal deficits. Psychiatric : well groomed. Normal thought content. Mood is normal and affect is congruent. BMI was elevated today, and weight loss plan recommended is : conventional weight loss and daily exercise regimen. No visits with results within 3 Month(s) from this visit. Latest known visit with results is:   Hospital Outpatient Visit on 02/19/2019   Component Date Value    Microalbumin, Random Uri* 02/19/2019 <12.0     Creatinine, Ur 02/19/2019 41     Microalbumin Creatinine * 02/19/2019 -*    Cholesterol, Total 02/19/2019 207*    Triglycerides 02/19/2019 147     HDL 02/19/2019 42     LDL Calculated 02/19/2019 136*    VLDL Cholesterol Calcula* 02/19/2019 29     Hemoglobin A1C 02/19/2019 5.9*    Sodium 02/19/2019 135     Potassium 02/19/2019 4.1     Chloride 02/19/2019 99     CO2 02/19/2019 24     Anion Gap 02/19/2019 12     Glucose 02/19/2019 127*    BUN 02/19/2019 13     CREATININE 02/19/2019 0.7     GFR Non- 02/19/2019 >60     GFR  02/19/2019 >60     Calcium 02/19/2019 9.5     Total Protein 02/19/2019 8.0     Alb 02/19/2019 4.2     Total Bilirubin 02/19/2019 0.6     Alkaline Phosphatase 02/19/2019 88     ALT 02/19/2019 14     AST 02/19/2019 19        Assessment / Plan :      1. Essential hypertension  BP at home 130-140/80  Having side effects with lisinopril and would like to stop it. Also did not tolerate 5/10 in the past due to side effects but did well on Diovan. diovan HCT    2. Mixed hyperlipidemia  Monitor regular basis    3. Type 2 diabetes mellitus without complication, without long-term current use of insulin (HCC)  Very well controlled.

## 2019-06-11 ENCOUNTER — OFFICE VISIT (OUTPATIENT)
Dept: FAMILY MEDICINE CLINIC | Age: 79
End: 2019-06-11
Payer: MEDICARE

## 2019-06-11 ENCOUNTER — TELEPHONE (OUTPATIENT)
Dept: FAMILY MEDICINE CLINIC | Age: 79
End: 2019-06-11

## 2019-06-11 VITALS
HEIGHT: 64 IN | RESPIRATION RATE: 16 BRPM | SYSTOLIC BLOOD PRESSURE: 135 MMHG | WEIGHT: 193 LBS | BODY MASS INDEX: 32.95 KG/M2 | DIASTOLIC BLOOD PRESSURE: 83 MMHG | HEART RATE: 75 BPM

## 2019-06-11 DIAGNOSIS — E78.2 MIXED HYPERLIPIDEMIA: Chronic | ICD-10-CM

## 2019-06-11 DIAGNOSIS — E11.9 TYPE 2 DIABETES MELLITUS WITHOUT COMPLICATION, WITHOUT LONG-TERM CURRENT USE OF INSULIN (HCC): ICD-10-CM

## 2019-06-11 DIAGNOSIS — I10 ESSENTIAL HYPERTENSION: Primary | Chronic | ICD-10-CM

## 2019-06-11 DIAGNOSIS — K21.9 GASTROESOPHAGEAL REFLUX DISEASE WITHOUT ESOPHAGITIS: ICD-10-CM

## 2019-06-11 PROCEDURE — G8427 DOCREV CUR MEDS BY ELIG CLIN: HCPCS | Performed by: FAMILY MEDICINE

## 2019-06-11 PROCEDURE — G8417 CALC BMI ABV UP PARAM F/U: HCPCS | Performed by: FAMILY MEDICINE

## 2019-06-11 PROCEDURE — 1036F TOBACCO NON-USER: CPT | Performed by: FAMILY MEDICINE

## 2019-06-11 PROCEDURE — 4040F PNEUMOC VAC/ADMIN/RCVD: CPT | Performed by: FAMILY MEDICINE

## 2019-06-11 PROCEDURE — 1090F PRES/ABSN URINE INCON ASSESS: CPT | Performed by: FAMILY MEDICINE

## 2019-06-11 PROCEDURE — 1123F ACP DISCUSS/DSCN MKR DOCD: CPT | Performed by: FAMILY MEDICINE

## 2019-06-11 PROCEDURE — G8400 PT W/DXA NO RESULTS DOC: HCPCS | Performed by: FAMILY MEDICINE

## 2019-06-11 PROCEDURE — 99214 OFFICE O/P EST MOD 30 MIN: CPT | Performed by: FAMILY MEDICINE

## 2019-06-11 RX ORDER — VALSARTAN AND HYDROCHLOROTHIAZIDE 80; 12.5 MG/1; MG/1
1 TABLET, FILM COATED ORAL DAILY
Qty: 90 TABLET | Refills: 1 | Status: SHIPPED | OUTPATIENT
Start: 2019-06-11 | End: 2019-06-24

## 2019-06-12 ENCOUNTER — TELEPHONE (OUTPATIENT)
Dept: FAMILY MEDICINE CLINIC | Age: 79
End: 2019-06-12

## 2019-06-12 NOTE — TELEPHONE ENCOUNTER
Pharmacy called and had a question on the medication sent yesterday. It was sent as generic but in the notes said \"No generic\". Please advise if needs to be brand.

## 2019-06-13 RX ORDER — VALSARTAN/HYDROCHLOROTHIAZIDE 80-12.5MG
1 TABLET ORAL DAILY
COMMUNITY
End: 2019-09-12

## 2019-06-17 ENCOUNTER — HOSPITAL ENCOUNTER (OUTPATIENT)
Age: 79
Discharge: HOME | End: 2019-06-17
Payer: MEDICARE

## 2019-06-17 ENCOUNTER — TELEPHONE (OUTPATIENT)
Dept: FAMILY MEDICINE CLINIC | Age: 79
End: 2019-06-17

## 2019-06-17 DIAGNOSIS — I10 ESSENTIAL HYPERTENSION: Chronic | ICD-10-CM

## 2019-06-17 DIAGNOSIS — E78.2 MIXED HYPERLIPIDEMIA: Chronic | ICD-10-CM

## 2019-06-17 DIAGNOSIS — E11.9 TYPE 2 DIABETES MELLITUS WITHOUT COMPLICATION, WITHOUT LONG-TERM CURRENT USE OF INSULIN (HCC): ICD-10-CM

## 2019-06-17 LAB
ALBUMIN SERPL-MCNC: 4.1 G/DL (ref 3.5–5.2)
ALP BLD-CCNC: 87 U/L (ref 35–104)
ALT SERPL-CCNC: 15 U/L (ref 0–32)
ANION GAP SERPL CALCULATED.3IONS-SCNC: 11 MMOL/L (ref 7–16)
AST SERPL-CCNC: 21 U/L (ref 0–31)
BILIRUB SERPL-MCNC: 0.4 MG/DL (ref 0–1.2)
BUN BLDV-MCNC: 10 MG/DL (ref 8–23)
CALCIUM SERPL-MCNC: 9.1 MG/DL (ref 8.6–10.2)
CHLORIDE BLD-SCNC: 102 MMOL/L (ref 98–107)
CHOLESTEROL, TOTAL: 178 MG/DL (ref 0–199)
CO2: 24 MMOL/L (ref 22–29)
CREAT SERPL-MCNC: 0.6 MG/DL (ref 0.5–1)
CREATININE URINE: 82 MG/DL (ref 29–226)
GFR AFRICAN AMERICAN: >60
GFR NON-AFRICAN AMERICAN: >60 ML/MIN/1.73
GLUCOSE BLD-MCNC: 136 MG/DL (ref 74–99)
HBA1C MFR BLD: 6.2 % (ref 4–5.6)
HDLC SERPL-MCNC: 34 MG/DL
LDL CHOLESTEROL CALCULATED: 111 MG/DL (ref 0–99)
MICROALBUMIN UR-MCNC: 13.5 MG/L
MICROALBUMIN/CREAT UR-RTO: 16.5 (ref 0–30)
POTASSIUM SERPL-SCNC: 4.3 MMOL/L (ref 3.5–5)
SODIUM BLD-SCNC: 137 MMOL/L (ref 132–146)
TOTAL PROTEIN: 7.3 G/DL (ref 6.4–8.3)
TRIGL SERPL-MCNC: 167 MG/DL (ref 0–149)
VLDLC SERPL CALC-MCNC: 33 MG/DL

## 2019-06-17 PROCEDURE — 82044 UR ALBUMIN SEMIQUANTITATIVE: CPT

## 2019-06-17 PROCEDURE — 80053 COMPREHEN METABOLIC PANEL: CPT

## 2019-06-17 PROCEDURE — 83036 HEMOGLOBIN GLYCOSYLATED A1C: CPT

## 2019-06-17 PROCEDURE — 82570 ASSAY OF URINE CREATININE: CPT

## 2019-06-17 PROCEDURE — 36415 COLL VENOUS BLD VENIPUNCTURE: CPT

## 2019-06-17 PROCEDURE — 80061 LIPID PANEL: CPT

## 2019-06-24 ENCOUNTER — OFFICE VISIT (OUTPATIENT)
Dept: FAMILY MEDICINE CLINIC | Age: 79
End: 2019-06-24
Payer: MEDICARE

## 2019-06-24 VITALS
HEART RATE: 81 BPM | SYSTOLIC BLOOD PRESSURE: 151 MMHG | RESPIRATION RATE: 16 BRPM | DIASTOLIC BLOOD PRESSURE: 68 MMHG | BODY MASS INDEX: 32.78 KG/M2 | WEIGHT: 192 LBS | HEIGHT: 64 IN

## 2019-06-24 DIAGNOSIS — E11.9 TYPE 2 DIABETES MELLITUS WITHOUT COMPLICATION, WITHOUT LONG-TERM CURRENT USE OF INSULIN (HCC): ICD-10-CM

## 2019-06-24 DIAGNOSIS — Z00.00 ROUTINE GENERAL MEDICAL EXAMINATION AT A HEALTH CARE FACILITY: ICD-10-CM

## 2019-06-24 DIAGNOSIS — I10 ESSENTIAL HYPERTENSION: Primary | Chronic | ICD-10-CM

## 2019-06-24 PROCEDURE — 4040F PNEUMOC VAC/ADMIN/RCVD: CPT | Performed by: FAMILY MEDICINE

## 2019-06-24 PROCEDURE — 1123F ACP DISCUSS/DSCN MKR DOCD: CPT | Performed by: FAMILY MEDICINE

## 2019-06-24 PROCEDURE — G0438 PPPS, INITIAL VISIT: HCPCS | Performed by: FAMILY MEDICINE

## 2019-06-24 ASSESSMENT — LIFESTYLE VARIABLES: HOW OFTEN DO YOU HAVE A DRINK CONTAINING ALCOHOL: 0

## 2019-06-24 ASSESSMENT — PATIENT HEALTH QUESTIONNAIRE - PHQ9
SUM OF ALL RESPONSES TO PHQ QUESTIONS 1-9: 0
SUM OF ALL RESPONSES TO PHQ QUESTIONS 1-9: 0

## 2019-06-24 ASSESSMENT — ANXIETY QUESTIONNAIRES: GAD7 TOTAL SCORE: 1

## 2019-06-24 NOTE — PROGRESS NOTES
Medicare Annual Wellness Visit  Name: Richardean Sandhoff Date: 2019   MRN: [id number] Sex: Female   Age: 67 y.o. Ethnicity: Non-/Non    : 1940 Race: White      Janny Acosta is here for wellness visit  BP at home is well controlled at 120-130/80  Vaccines reviewed and up to date. Scheduled for colonoscopy soon  Up to date on mammogram   Does not need pap due to age. Screenings for behavioral, psychosocial and functional/safety risks, and cognitive dysfunction are all negative except as indicated below. These results, as well as other patient data from the [address] form, are documented in Flowsheets linked to this Encounter. Allergies   Allergen Reactions    Penicillins Anaphylaxis    Aspirin Other (See Comments)     Severe nose bleed     Prior to Visit Medications    Medication Sig Taking? Authorizing Provider   DIOVAN HCT 80-12.5 MG per tablet Take 1 tablet by mouth daily  Historical Provider, MD   valsartan-hydrochlorothiazide (DIOVAN HCT) 80-12.5 MG per tablet Take 1 tablet by mouth daily No generic please  Caryn Méndez MD   amLODIPine (NORVASC) 2.5 MG tablet Take 1 tablet by mouth daily  Caryn Méndez MD   naproxen (NAPROSYN) 250 MG tablet Take 1 tablet by mouth 2 times daily as needed for Pain  Caryn Méndez MD   Multiple Vitamin (MULTIVITAMIN PO) Take  by mouth.  LD 3/6/15  Historical Provider, MD     Past Medical History:   Diagnosis Date    Arthritis     Bilateral carpal tunnel syndrome     right more than left    Diabetes mellitus ([facility] [address]     diet controlled & cinnamon    DM2 (diabetes mellitus, type 2) ([address] 2012    patient states not diabetic, family hx of diabetes; A1C-6.2    Endometrial cancer ([address]     early small cured    GERD (gastroesophageal reflux disease)     HTN (hypertension) 2012    Hyperlipidemia     borderline- no meds    Hypertension     controlled    Obesity 2013    Osteoarthritis     form, are documented in Flowsheets linked to this Encounter. Allergies   Allergen Reactions    Penicillins Anaphylaxis    Aspirin Other (See Comments)     Severe nose bleed     Prior to Visit Medications    Medication Sig Taking? Authorizing Provider   ASTRID HCT 80-12.5 MG per tablet Take 1 tablet by mouth daily  Historical Provider, MD   amLODIPine (NORVASC) 2.5 MG tablet Take 1 tablet by mouth daily  Chay Price MD   naproxen (NAPROSYN) 250 MG tablet Take 1 tablet by mouth 2 times daily as needed for Pain  Chay Price MD   Multiple Vitamin (MULTIVITAMIN PO) Take  by mouth.  LD 3/6/15  Historical Provider, MD     Past Medical History:   Diagnosis Date    Arthritis     Bilateral carpal tunnel syndrome     right more than left    Diabetes mellitus ([facility]     diet controlled & cinnamon    DM2 (diabetes mellitus, type 2) ([facility] [address] 2/16/2012    patient states not diabetic, family hx of diabetes; A1C-6.2    Endometrial cancer ([facility] [address] 1992    early small cured    GERD (gastroesophageal reflux disease)     HTN (hypertension) 2/16/2012    Hyperlipidemia     borderline- no meds    Hypertension     controlled    Obesity 5/28/2013    Osteoarthritis     Osteoarthritis of right knee     [place]    Sinus drainage     seasonal (winter)    Spondylosis of lumbosacral joint     Voice absence     only with anxiety     Past Surgical History:   Procedure Laterality Date    COLONOSCOPY  [address]    1 hyperplastic polyp 2mm    HYSTERECTOMY  1990's    [facility] BSO    JOINT REPLACEMENT Right     Knee    OTHER SURGICAL HISTORY  3/11/2015    EGD with Biopsy    TONSILLECTOMY      age 13    TOTAL KNEE ARTHROPLASTY  2/2012    right    UPPER GASTROINTESTINAL ENDOSCOPY  08     Family History   Problem Relation Age of Onset    Diabetes Mother     Heart Disease Brother     Heart Disease Father        CareTeam (Including outside providers/suppliers regularly involved in providing care):   Patient Care Team:  Chay Price MD as PCP - General (Family Medicine)  Milagro Dumont MD as PCP - [facility] Empaneled Provider    Wt Readings from Last 3 Encounters:   06/24/19 192 lb (87.1 kg)   06/11/19 193 lb (87.5 kg)   04/30/19 194 lb (88 kg)     Vitals:    06/24/19 1450 06/24/19 1502   BP: (!) 166/88 (!) 151/68   Pulse: 81    Resp: 16    Weight: 192 lb (87.1 kg)    Height: 5' 4\" (1.626 m)      Body mass index is 32.96 kg/m². Based upon direct observation of the patient, evaluation of cognition reveals recent and remote memory intact. Exam as noted above. Patient's complete Health Risk Assessment and screening values have been reviewed and are found in Flowsheets. The following problems were reviewed today and where indicated follow up appointments were made and/or referrals ordered. Positive Risk Factor Screenings with Interventions:     Health Habits/Nutrition:  Health Habits/Nutrition  Do you exercise for at least 20 minutes 2-3 times per week?: Yes  Have you lost any weight without trying in the past 3 months?: No  Do you eat fewer than 2 meals per day?: No  Have you seen a dentist within the past year?: Yes  Body mass index is 32.96 kg/m².   Health Habits/Nutrition Interventions:  · Inadequate physical activity:  patient agrees to exercise for at least 150 minutes/week    Hearing/Vision:  Hearing/Vision  Do you or your family notice any trouble with your hearing?: (!) Yes  Do you have difficulty driving, watching TV, or doing any of your daily activities because of your eyesight?: No  Have you had an eye exam within the past year?: Yes  Hearing/Vision Interventions:  · None    Safety:  Safety  Do you have working smoke detectors?: Yes  Have all throw rugs been removed or fastened?: (!) No  Do you have non-slip mats in all bathtubs?: Yes  Do all of your stairways have a railing or banister?: Yes  Are your doorways, halls and stairs free of clutter?: Yes  Do you always fasten your seatbelt when you are in a car?: Yes  Safety Interventions:  · Home safety tips provided    Personalized Preventive Plan   Current Health Maintenance Status  Immunization History   Administered Date(s) Administered    Influenza 10/08/2013    Influenza Vaccine, unspecified formulation 10/01/2016    Influenza Virus Vaccine 10/01/2014    Influenza, High Dose (Fluzone 65 yrs and older) 10/12/2015, 10/01/2018    Pneumococcal Conjugate 13-valent ([id number]) 07/29/2015    Pneumococcal Polysaccharide ([id number]) 10/08/2013    Tdap (Boostrix, Adacel) 11/29/2016    Zoster Live (Zostavax) 01/01/2013        Health Maintenance   Topic Date Due    Shingles Vaccine (2 of 3) 02/26/2013    Potassium monitoring  06/17/2020    Creatinine monitoring  06/17/2020    DTaP/Tdap/Td vaccine (2 - Td) 11/29/2026    Flu vaccine  Completed    Pneumococcal 65+ years Vaccine  Completed    DEXA (modify frequency per FRAX score)  Addressed     Recommendations for Preventive Services Due: see orders and patient instructions/AVS.  .   Recommended screening schedule for the next 5-10 years is provided to the patient in written form: see Patient Instructions/AVS.

## 2019-09-12 RX ORDER — VALSARTAN/HYDROCHLOROTHIAZIDE 80-12.5MG
TABLET ORAL
Qty: 30 TABLET | Refills: 5 | Status: SHIPPED | OUTPATIENT
Start: 2019-09-12 | End: 2019-11-25 | Stop reason: SDUPTHER

## 2019-09-16 ENCOUNTER — HOSPITAL ENCOUNTER (OUTPATIENT)
Age: 79
Discharge: HOME | End: 2019-09-18

## 2019-09-16 PROCEDURE — 87081 CULTURE SCREEN ONLY: CPT

## 2019-09-16 PROCEDURE — 88305 TISSUE EXAM BY PATHOLOGIST: CPT

## 2019-09-17 LAB — CLOTEST: NORMAL

## 2019-11-25 RX ORDER — VALSARTAN/HYDROCHLOROTHIAZIDE 80-12.5MG
TABLET ORAL
Qty: 90 TABLET | Refills: 1 | Status: SHIPPED | OUTPATIENT
Start: 2019-11-25 | End: 2020-01-27 | Stop reason: SDUPTHER

## 2019-12-04 ENCOUNTER — TELEPHONE (OUTPATIENT)
Dept: FAMILY MEDICINE CLINIC | Age: 79
End: 2019-12-04

## 2019-12-06 ENCOUNTER — OFFICE VISIT (OUTPATIENT)
Dept: FAMILY MEDICINE CLINIC | Age: 79
End: 2019-12-06
Payer: MEDICARE

## 2019-12-06 DIAGNOSIS — M54.2 NECK PAIN: Primary | ICD-10-CM

## 2019-12-06 PROCEDURE — G8399 PT W/DXA RESULTS DOCUMENT: HCPCS | Performed by: FAMILY MEDICINE

## 2019-12-06 PROCEDURE — 1036F TOBACCO NON-USER: CPT | Performed by: FAMILY MEDICINE

## 2019-12-06 PROCEDURE — 1090F PRES/ABSN URINE INCON ASSESS: CPT | Performed by: FAMILY MEDICINE

## 2019-12-06 PROCEDURE — G8482 FLU IMMUNIZE ORDER/ADMIN: HCPCS | Performed by: FAMILY MEDICINE

## 2019-12-06 PROCEDURE — G8417 CALC BMI ABV UP PARAM F/U: HCPCS | Performed by: FAMILY MEDICINE

## 2019-12-06 PROCEDURE — 99213 OFFICE O/P EST LOW 20 MIN: CPT | Performed by: FAMILY MEDICINE

## 2019-12-06 PROCEDURE — 4040F PNEUMOC VAC/ADMIN/RCVD: CPT | Performed by: FAMILY MEDICINE

## 2019-12-06 PROCEDURE — 1123F ACP DISCUSS/DSCN MKR DOCD: CPT | Performed by: FAMILY MEDICINE

## 2019-12-06 PROCEDURE — G8427 DOCREV CUR MEDS BY ELIG CLIN: HCPCS | Performed by: FAMILY MEDICINE

## 2019-12-06 RX ORDER — CYCLOBENZAPRINE HCL 5 MG
5 TABLET ORAL 2 TIMES DAILY PRN
Qty: 15 TABLET | Refills: 0 | Status: SHIPPED | OUTPATIENT
Start: 2019-12-06 | End: 2020-01-05

## 2019-12-08 VITALS
DIASTOLIC BLOOD PRESSURE: 83 MMHG | HEIGHT: 64 IN | WEIGHT: 188 LBS | RESPIRATION RATE: 16 BRPM | SYSTOLIC BLOOD PRESSURE: 135 MMHG | BODY MASS INDEX: 32.1 KG/M2 | HEART RATE: 80 BPM

## 2019-12-08 ASSESSMENT — PATIENT HEALTH QUESTIONNAIRE - PHQ9
2. FEELING DOWN, DEPRESSED OR HOPELESS: 0
SUM OF ALL RESPONSES TO PHQ QUESTIONS 1-9: 0
SUM OF ALL RESPONSES TO PHQ9 QUESTIONS 1 & 2: 0
SUM OF ALL RESPONSES TO PHQ QUESTIONS 1-9: 0
1. LITTLE INTEREST OR PLEASURE IN DOING THINGS: 0

## 2019-12-30 ENCOUNTER — HOSPITAL ENCOUNTER (OUTPATIENT)
Age: 79
Discharge: HOME | End: 2019-12-30
Payer: MEDICARE

## 2019-12-30 DIAGNOSIS — E11.9 TYPE 2 DIABETES MELLITUS WITHOUT COMPLICATION, WITHOUT LONG-TERM CURRENT USE OF INSULIN (HCC): ICD-10-CM

## 2019-12-30 DIAGNOSIS — I10 ESSENTIAL HYPERTENSION: Chronic | ICD-10-CM

## 2019-12-30 LAB
ALBUMIN SERPL-MCNC: 3.7 G/DL (ref 3.5–5.2)
ALP BLD-CCNC: 98 U/L (ref 35–104)
ALT SERPL-CCNC: 11 U/L (ref 0–32)
ANION GAP SERPL CALCULATED.3IONS-SCNC: 13 MMOL/L (ref 7–16)
AST SERPL-CCNC: 16 U/L (ref 0–31)
BILIRUB SERPL-MCNC: 0.3 MG/DL (ref 0–1.2)
BUN BLDV-MCNC: 11 MG/DL (ref 8–23)
CALCIUM SERPL-MCNC: 9.6 MG/DL (ref 8.6–10.2)
CHLORIDE BLD-SCNC: 100 MMOL/L (ref 98–107)
CHOLESTEROL, TOTAL: 155 MG/DL (ref 0–199)
CO2: 25 MMOL/L (ref 22–29)
CREAT SERPL-MCNC: 0.8 MG/DL (ref 0.5–1)
CREATININE URINE: 54 MG/DL (ref 29–226)
GFR AFRICAN AMERICAN: >60
GFR NON-AFRICAN AMERICAN: >60 ML/MIN/1.73
GLUCOSE BLD-MCNC: 138 MG/DL (ref 74–99)
HBA1C MFR BLD: 6.4 % (ref 4–5.6)
HDLC SERPL-MCNC: 30 MG/DL
LDL CHOLESTEROL CALCULATED: 93 MG/DL (ref 0–99)
MICROALBUMIN UR-MCNC: <12 MG/L
MICROALBUMIN/CREAT UR-RTO: ABNORMAL (ref 0–30)
POTASSIUM SERPL-SCNC: 3.8 MMOL/L (ref 3.5–5)
SODIUM BLD-SCNC: 138 MMOL/L (ref 132–146)
TOTAL PROTEIN: 7.8 G/DL (ref 6.4–8.3)
TRIGL SERPL-MCNC: 159 MG/DL (ref 0–149)
VLDLC SERPL CALC-MCNC: 32 MG/DL

## 2019-12-30 PROCEDURE — 83036 HEMOGLOBIN GLYCOSYLATED A1C: CPT

## 2019-12-30 PROCEDURE — 80053 COMPREHEN METABOLIC PANEL: CPT

## 2019-12-30 PROCEDURE — 80061 LIPID PANEL: CPT

## 2019-12-30 PROCEDURE — 82570 ASSAY OF URINE CREATININE: CPT

## 2019-12-30 PROCEDURE — 82044 UR ALBUMIN SEMIQUANTITATIVE: CPT

## 2019-12-30 PROCEDURE — 36415 COLL VENOUS BLD VENIPUNCTURE: CPT

## 2020-01-06 NOTE — PROGRESS NOTES
GASTROINTESTINAL ENDOSCOPY  08       Current Outpatient Medications   Medication Sig Dispense Refill    DIOVAN HCT 80-12.5 MG per tablet TAKE 1 TABLET BY MOUTH  DAILY 90 tablet 1    naproxen (NAPROSYN) 250 MG tablet Take 1 tablet by mouth 2 times daily as needed for Pain 30 tablet 1    Multiple Vitamin (MULTIVITAMIN PO) Take  by mouth. LD 3/6/15       No current facility-administered medications for this visit. Family History   Problem Relation Age of Onset    Diabetes Mother     Heart Disease Brother     Heart Disease Father        Allergies :  Penicillins and Aspirin      Review of systems :    No headaches, no visual disturbances. No weakness, no numbness. No depression, no anxiety. No rhinorrhea. No sore throat, no hearing loss. No cough,  dyspnea. No chest pain on exertion. No edema. No intermittent claudication. No abdominal pain. No nausea, no change in bowel habits. No joint pain, no swelling. No myalgias . No foot pain or sores. No skin lesions, no rash. No heat or cold intolerance. No weight changes. No flank pain, no hematuria. No genital lesions or abnormalities. Physical examination :  Blood pressure 125/82, pulse 116, resp. rate 18, height 5' 4\" (1.626 m), weight 180 lb (81.6 kg). GA: alert oriented x 3, no distress. HEENT : unremarkable. Neck : supple, no masses. Thyroid : no goiter. Lymph nodes : No cervical or supraclavicular lymphadenopathy. Carotids : no  bruits. Chest : no deformities. Lungs : clear, no wheezing or crackles. No consolidation. Heart : Irregularly irregular, normal S1S2 . No murmurs. Abdomen : soft, no masses. No hepatomegaly  or splenomegaly. No bruits. Extremities : no edema. Normal peripheral pulses. Skin : no suspicious lesions or rash. Musculoskeletal : no gross deformities. Feet : no lesions or deformities. Normal sensations to touch, monofilament, cold and vibration. Neurological : normal gait and balance. Normal speech.  No focal deficits. Psychiatric : well groomed. Normal thought content. Mood is normal and affect is congruent. BMI was elevated today, and weight loss plan recommended is : conventional weight loss and daily exercise regimen. Hospital Outpatient Visit on 12/30/2019   Component Date Value    Microalbumin, Random Uri* 12/30/2019 <12.0     Creatinine, Ur 12/30/2019 54     Microalbumin Creatinine * 12/30/2019 -*    Cholesterol, Total 12/30/2019 155     Triglycerides 12/30/2019 159*    HDL 12/30/2019 30     LDL Calculated 12/30/2019 93     VLDL Cholesterol Calcula* 12/30/2019 32     Hemoglobin A1C 12/30/2019 6.4*    Sodium 12/30/2019 138     Potassium 12/30/2019 3.8     Chloride 12/30/2019 100     CO2 12/30/2019 25     Anion Gap 12/30/2019 13     Glucose 12/30/2019 138*    BUN 12/30/2019 11     CREATININE 12/30/2019 0.8     GFR Non- 12/30/2019 >60     GFR  12/30/2019 >60     Calcium 12/30/2019 9.6     Total Protein 12/30/2019 7.8     Alb 12/30/2019 3.7     Total Bilirubin 12/30/2019 0.3     Alkaline Phosphatase 12/30/2019 98     ALT 12/30/2019 11     AST 12/30/2019 16        Assessment / Plan:      1. Essential hypertension  Well controlled. Continue same regimen    2. Mixed hyperlipidemia  As above. Diet and exercise    3. Gastroesophageal reflux disease without esophagitis  Currently asymptomatic    4. Type 2 diabetes mellitus without complication, without long-term current use of insulin (HCC)  Was in remission but A1c level slowly rising. Most recent 2 weeks ago was 6.4%  Patient fully informed. Script to [facility] for glucometer and test strips   Checks daily    5. Headache resolved    6. Atrial fibrillation  [id number] score is 5  Recent onset and  essentially asymptomatic except for mild dyspnea on exertion.   Start metoprolol XL  eliquis 5 mg bid  Patient fully informed about increased risk of cerebrovascular accident  Refer to cardiol          Patient again reminded of importance of close control of  hyperglycemia, hypertension  and hyperlipidemia in order to minimize the risks of accelerated atherosclerosis ( heart disease, strokes, PAD ) and microvascular complications such as renal disease, retinal disease and neuropathies. Also reminded of the importance of a low calorie, low fat and low salt diet. Encouraged to exercise regularly and maintain proper foot care at all times. Patient advised to check feet daily and call if any sores lesions or skin abnormalities or changes. To have yearly eye examinations and monitor the glucose levels at home. All questions answered. Call at any time if sugar levels remains high or abnormally low. Call if sore of foot or leg. Call if chest pain with exertion or with physical activity. Call if unusual shortness or breath or if any questions or concerns. Follow-up : as scheduled.

## 2020-01-07 ENCOUNTER — TELEPHONE (OUTPATIENT)
Dept: FAMILY MEDICINE CLINIC | Age: 80
End: 2020-01-07

## 2020-01-07 ENCOUNTER — OFFICE VISIT (OUTPATIENT)
Dept: FAMILY MEDICINE CLINIC | Age: 80
End: 2020-01-07
Payer: MEDICARE

## 2020-01-07 VITALS
HEIGHT: 64 IN | RESPIRATION RATE: 18 BRPM | HEART RATE: 116 BPM | WEIGHT: 180 LBS | SYSTOLIC BLOOD PRESSURE: 125 MMHG | BODY MASS INDEX: 30.73 KG/M2 | DIASTOLIC BLOOD PRESSURE: 82 MMHG

## 2020-01-07 PROCEDURE — G8427 DOCREV CUR MEDS BY ELIG CLIN: HCPCS | Performed by: FAMILY MEDICINE

## 2020-01-07 PROCEDURE — G8482 FLU IMMUNIZE ORDER/ADMIN: HCPCS | Performed by: FAMILY MEDICINE

## 2020-01-07 PROCEDURE — 1036F TOBACCO NON-USER: CPT | Performed by: FAMILY MEDICINE

## 2020-01-07 PROCEDURE — G8417 CALC BMI ABV UP PARAM F/U: HCPCS | Performed by: FAMILY MEDICINE

## 2020-01-07 PROCEDURE — 4040F PNEUMOC VAC/ADMIN/RCVD: CPT | Performed by: FAMILY MEDICINE

## 2020-01-07 PROCEDURE — 93000 ELECTROCARDIOGRAM COMPLETE: CPT | Performed by: FAMILY MEDICINE

## 2020-01-07 PROCEDURE — G8399 PT W/DXA RESULTS DOCUMENT: HCPCS | Performed by: FAMILY MEDICINE

## 2020-01-07 PROCEDURE — 1123F ACP DISCUSS/DSCN MKR DOCD: CPT | Performed by: FAMILY MEDICINE

## 2020-01-07 PROCEDURE — 1090F PRES/ABSN URINE INCON ASSESS: CPT | Performed by: FAMILY MEDICINE

## 2020-01-07 PROCEDURE — 99214 OFFICE O/P EST MOD 30 MIN: CPT | Performed by: FAMILY MEDICINE

## 2020-01-07 RX ORDER — METOPROLOL SUCCINATE 25 MG/1
25 TABLET, EXTENDED RELEASE ORAL DAILY
Qty: 30 TABLET | Refills: 5 | Status: SHIPPED | OUTPATIENT
Start: 2020-01-07 | End: 2020-01-07 | Stop reason: ALTCHOICE

## 2020-01-07 RX ORDER — METOPROLOL SUCCINATE 25 MG
25 TABLET, EXTENDED RELEASE 24 HR ORAL DAILY
Qty: 30 TABLET | Refills: 3 | Status: SHIPPED | OUTPATIENT
Start: 2020-01-07 | End: 2020-01-27

## 2020-01-07 ASSESSMENT — PATIENT HEALTH QUESTIONNAIRE - PHQ9
1. LITTLE INTEREST OR PLEASURE IN DOING THINGS: 0
SUM OF ALL RESPONSES TO PHQ9 QUESTIONS 1 & 2: 0
2. FEELING DOWN, DEPRESSED OR HOPELESS: 0
SUM OF ALL RESPONSES TO PHQ QUESTIONS 1-9: 0
SUM OF ALL RESPONSES TO PHQ QUESTIONS 1-9: 0

## 2020-01-15 ENCOUNTER — OFFICE VISIT (OUTPATIENT)
Dept: CARDIOLOGY CLINIC | Age: 80
End: 2020-01-15
Payer: MEDICARE

## 2020-01-15 VITALS
WEIGHT: 180 LBS | HEART RATE: 132 BPM | BODY MASS INDEX: 30.73 KG/M2 | SYSTOLIC BLOOD PRESSURE: 138 MMHG | HEIGHT: 64 IN | DIASTOLIC BLOOD PRESSURE: 86 MMHG

## 2020-01-15 PROCEDURE — G8399 PT W/DXA RESULTS DOCUMENT: HCPCS | Performed by: INTERNAL MEDICINE

## 2020-01-15 PROCEDURE — 93000 ELECTROCARDIOGRAM COMPLETE: CPT | Performed by: INTERNAL MEDICINE

## 2020-01-15 PROCEDURE — 1036F TOBACCO NON-USER: CPT | Performed by: INTERNAL MEDICINE

## 2020-01-15 PROCEDURE — G8482 FLU IMMUNIZE ORDER/ADMIN: HCPCS | Performed by: INTERNAL MEDICINE

## 2020-01-15 PROCEDURE — 99215 OFFICE O/P EST HI 40 MIN: CPT | Performed by: INTERNAL MEDICINE

## 2020-01-15 PROCEDURE — G8417 CALC BMI ABV UP PARAM F/U: HCPCS | Performed by: INTERNAL MEDICINE

## 2020-01-15 PROCEDURE — 1123F ACP DISCUSS/DSCN MKR DOCD: CPT | Performed by: INTERNAL MEDICINE

## 2020-01-15 PROCEDURE — G8427 DOCREV CUR MEDS BY ELIG CLIN: HCPCS | Performed by: INTERNAL MEDICINE

## 2020-01-15 PROCEDURE — 4040F PNEUMOC VAC/ADMIN/RCVD: CPT | Performed by: INTERNAL MEDICINE

## 2020-01-15 PROCEDURE — 1090F PRES/ABSN URINE INCON ASSESS: CPT | Performed by: INTERNAL MEDICINE

## 2020-01-15 RX ORDER — METOPROLOL SUCCINATE 25 MG
25 TABLET, EXTENDED RELEASE 24 HR ORAL DAILY
Qty: 30 TABLET | Refills: 5 | Status: SHIPPED | OUTPATIENT
Start: 2020-01-15 | End: 2020-01-27

## 2020-01-15 RX ORDER — METOPROLOL SUCCINATE 25 MG
25 TABLET, EXTENDED RELEASE 24 HR ORAL DAILY
COMMUNITY
End: 2020-01-15 | Stop reason: SDUPTHER

## 2020-01-15 NOTE — PROGRESS NOTES
hypertension     3. Chronic anticoagulation       Above assessment cardiac issues stable. PLAN:   See above orders. Old records were reviewed and found to reveal: Normal CMP on 12/30/2019. Had long discussion with patient regarding the importance of taking her beta-blocker for rate control and anticoagulation to prevent a CVA from her A. Fib. Will start patient on name brand Toprol-XL  Discussed issues that would prompt earlier evaluation. Follow-up office visit in 3 months.

## 2020-01-21 ENCOUNTER — TELEPHONE (OUTPATIENT)
Dept: FAMILY MEDICINE CLINIC | Age: 80
End: 2020-01-21

## 2020-01-27 ENCOUNTER — HOSPITAL ENCOUNTER (OUTPATIENT)
Age: 80
Discharge: HOME | End: 2020-01-27
Payer: MEDICARE

## 2020-01-27 ENCOUNTER — OFFICE VISIT (OUTPATIENT)
Dept: FAMILY MEDICINE CLINIC | Age: 80
End: 2020-01-27
Payer: MEDICARE

## 2020-01-27 VITALS
HEIGHT: 64 IN | TEMPERATURE: 97.7 F | RESPIRATION RATE: 18 BRPM | HEART RATE: 117 BPM | DIASTOLIC BLOOD PRESSURE: 86 MMHG | SYSTOLIC BLOOD PRESSURE: 115 MMHG | WEIGHT: 182 LBS | OXYGEN SATURATION: 98 % | BODY MASS INDEX: 31.07 KG/M2

## 2020-01-27 LAB
T3 TOTAL: 107.8 NG/DL (ref 80–200)
T4 TOTAL: 7.4 MCG/DL (ref 4.5–11.7)
TSH SERPL DL<=0.05 MIU/L-ACNC: 1.96 UIU/ML (ref 0.27–4.2)

## 2020-01-27 PROCEDURE — 99213 OFFICE O/P EST LOW 20 MIN: CPT | Performed by: FAMILY MEDICINE

## 2020-01-27 PROCEDURE — G8427 DOCREV CUR MEDS BY ELIG CLIN: HCPCS | Performed by: FAMILY MEDICINE

## 2020-01-27 PROCEDURE — 4040F PNEUMOC VAC/ADMIN/RCVD: CPT | Performed by: FAMILY MEDICINE

## 2020-01-27 PROCEDURE — 1090F PRES/ABSN URINE INCON ASSESS: CPT | Performed by: FAMILY MEDICINE

## 2020-01-27 PROCEDURE — 1036F TOBACCO NON-USER: CPT | Performed by: FAMILY MEDICINE

## 2020-01-27 PROCEDURE — G8482 FLU IMMUNIZE ORDER/ADMIN: HCPCS | Performed by: FAMILY MEDICINE

## 2020-01-27 PROCEDURE — 84436 ASSAY OF TOTAL THYROXINE: CPT

## 2020-01-27 PROCEDURE — 1123F ACP DISCUSS/DSCN MKR DOCD: CPT | Performed by: FAMILY MEDICINE

## 2020-01-27 PROCEDURE — 84480 ASSAY TRIIODOTHYRONINE (T3): CPT

## 2020-01-27 PROCEDURE — G8399 PT W/DXA RESULTS DOCUMENT: HCPCS | Performed by: FAMILY MEDICINE

## 2020-01-27 PROCEDURE — 84443 ASSAY THYROID STIM HORMONE: CPT

## 2020-01-27 PROCEDURE — G8417 CALC BMI ABV UP PARAM F/U: HCPCS | Performed by: FAMILY MEDICINE

## 2020-01-27 PROCEDURE — 36415 COLL VENOUS BLD VENIPUNCTURE: CPT

## 2020-01-27 RX ORDER — METOPROLOL SUCCINATE 25 MG
25 TABLET, EXTENDED RELEASE 24 HR ORAL 3 TIMES DAILY
Qty: 30 TABLET | Refills: 5
Start: 2020-01-27 | End: 2020-01-30

## 2020-01-27 RX ORDER — VALSARTAN/HYDROCHLOROTHIAZIDE 80-12.5MG
TABLET ORAL
Qty: 90 TABLET | Refills: 1
Start: 2020-01-27 | End: 2020-01-30

## 2020-01-27 ASSESSMENT — ENCOUNTER SYMPTOMS
ABDOMINAL PAIN: 0
CHEST TIGHTNESS: 0
SHORTNESS OF BREATH: 0

## 2020-01-27 NOTE — PROGRESS NOTES
[facility]  Family Medicine Residency Program  Phone: [phone]  Fax: [phone]    Patient:  Kristi Davenport 67 y.o. female                                 Date of Service: 1/27/20                            Chiefcomplaint:   Chief Complaint   Patient presents with    Atrial Fibrillation         History of Present Illness: The patient is a 67 y.o. female  presented to the clinic with complaints as above. afib - no sensations of palpitations/skipped beats etc.      HTN -taking all Bp meds in the AM.  Minimal lightheadedness noted but otherwise very tolerable. No sensations that she would fall. No CP/SOB/OLOSN/claudication. No palpitations. GERD - has been treating with vinegar and honey. Last episode she had was this week. Described as non-severe. Has had reflux sx but infrequently. No stomach discomfort.  +constipation, no diarrhea. No GI blood loss reported. She is treating constipation with prunes and colace. DM2 - diet controlled, no sx to report. Review of Systems:   Review of Systems   Respiratory: Negative for chest tightness and shortness of breath. Cardiovascular: Negative for chest pain and palpitations. Gastrointestinal: Negative for abdominal pain.        Past Medical History:      Diagnosis Date    Arthritis     Bilateral carpal tunnel syndrome     right more than left    Diabetes mellitus ([address]     diet controlled & cinnamon    DM2 (diabetes mellitus, type 2) ([address] 2/16/2012    patient states not diabetic, family hx of diabetes; A1C-6.2    Endometrial cancer ([address] 1992    early small cured    GERD (gastroesophageal reflux disease)     HTN (hypertension) 2/16/2012    Hyperlipidemia     borderline- no meds    Hypertension     controlled    Obesity 5/28/2013    Osteoarthritis     Osteoarthritis of right knee     [place]    Sinus drainage     seasonal (winter)    Spondylosis of lumbosacral joint     Voice absence     only Physical Exam:    Vitals: /86   Pulse 117   Temp 97.7 °F (36.5 °C) (Temporal)   Resp 18   Ht 5' 4\" (1.626 m)   Wt 182 lb (82.6 kg)   SpO2 98%   BMI 31.24 kg/m²   BP Readings from Last 3 Encounters:   01/27/20 115/86   01/15/20 138/86   01/07/20 125/82     General Appearance: Well developed, awake, alert, oriented, no acute distress  Chest wall/Lung: Clear to auscultation bilaterally,  respirations unlabored. No ronchi/wheezing/rales  Heart[de-identified]  Irregular tachy rate and rhythm, S1and S2 normal, no murmur, rub or gallop. Abdomen: Soft, non-tender, bowel sounds normoactive, no masses, no organomegaly  Extremities:  Extremities normal, atraumatic, no cyanosis. +2 edema. Assessment and Plan:         1. Atrial fibrillation, unspecified type ([address]  NOT rate controlled - increase toprol. Decrease diovan to compensate for BP. Close follow up in ~3days. Call with issues. - TOPROL XL 25 MG extended release tablet; Take 1 tablet by mouth 3 times daily  Dispense: 30 tablet; Refill: 5  - DIOVAN HCT 80-12.5 MG per tablet; TAKE 1/2 TABLET BY MOUTH  DAILY  Dispense: 90 tablet; Refill: 1    2. Screening for breast cancer  - [facility] CAD SCREENING; Future    3. Encounter for screening mammogram for malignant neoplasm of breast   - [facility] CAD SCREENING; Future        Return to Office: Return in about 3 days (around 1/30/2020) for Thursday Morning - neck pain and HR/BP. I encourage further reading and education about your health conditions. Information on many healthconditions is provided by the American Academy of Family Physicians: https://familydoctor. org/  Please bring any questions to me at your next visit. This document may have been prepared at least partiallythrough the use of voice recognition software. Although effort is taken to assure the accuracy of this document, it is possible that grammatical, syntax,  or spelling errors may occur.     Medication List:    Current Outpatient Medications

## 2020-01-29 ENCOUNTER — TELEPHONE (OUTPATIENT)
Dept: CARDIOLOGY CLINIC | Age: 80
End: 2020-01-29

## 2020-01-30 ENCOUNTER — OFFICE VISIT (OUTPATIENT)
Dept: FAMILY MEDICINE CLINIC | Age: 80
End: 2020-01-30
Payer: MEDICARE

## 2020-01-30 VITALS
DIASTOLIC BLOOD PRESSURE: 76 MMHG | SYSTOLIC BLOOD PRESSURE: 117 MMHG | RESPIRATION RATE: 20 BRPM | HEART RATE: 78 BPM | WEIGHT: 180 LBS | OXYGEN SATURATION: 97 % | HEIGHT: 64 IN | BODY MASS INDEX: 30.73 KG/M2 | TEMPERATURE: 97.9 F

## 2020-01-30 PROCEDURE — 99214 OFFICE O/P EST MOD 30 MIN: CPT | Performed by: FAMILY MEDICINE

## 2020-01-30 PROCEDURE — G8417 CALC BMI ABV UP PARAM F/U: HCPCS | Performed by: FAMILY MEDICINE

## 2020-01-30 PROCEDURE — G8427 DOCREV CUR MEDS BY ELIG CLIN: HCPCS | Performed by: FAMILY MEDICINE

## 2020-01-30 PROCEDURE — 1036F TOBACCO NON-USER: CPT | Performed by: FAMILY MEDICINE

## 2020-01-30 PROCEDURE — G8399 PT W/DXA RESULTS DOCUMENT: HCPCS | Performed by: FAMILY MEDICINE

## 2020-01-30 PROCEDURE — 4040F PNEUMOC VAC/ADMIN/RCVD: CPT | Performed by: FAMILY MEDICINE

## 2020-01-30 PROCEDURE — 1123F ACP DISCUSS/DSCN MKR DOCD: CPT | Performed by: FAMILY MEDICINE

## 2020-01-30 PROCEDURE — G8482 FLU IMMUNIZE ORDER/ADMIN: HCPCS | Performed by: FAMILY MEDICINE

## 2020-01-30 PROCEDURE — 1090F PRES/ABSN URINE INCON ASSESS: CPT | Performed by: FAMILY MEDICINE

## 2020-01-30 RX ORDER — METOPROLOL SUCCINATE 50 MG
50 TABLET, EXTENDED RELEASE 24 HR ORAL 2 TIMES DAILY
Qty: 180 TABLET | Refills: 1
Start: 2020-01-30 | End: 2020-02-04

## 2020-01-30 RX ORDER — LANSOPRAZOLE 30 MG
30 CAPSULE,DELAYED RELEASE (ENTERIC COATED) ORAL DAILY
Qty: 90 CAPSULE | Refills: 1 | Status: SHIPPED
Start: 2020-01-30 | End: 2020-11-02 | Stop reason: CLARIF

## 2020-01-30 RX ORDER — VALSARTAN 40 MG/1
40 TABLET ORAL DAILY
Qty: 90 TABLET | Refills: 1 | Status: SHIPPED | OUTPATIENT
Start: 2020-01-30 | End: 2020-02-03 | Stop reason: SDUPTHER

## 2020-01-30 ASSESSMENT — ENCOUNTER SYMPTOMS
SHORTNESS OF BREATH: 0
BLOOD IN STOOL: 0
CHEST TIGHTNESS: 0
ABDOMINAL PAIN: 1

## 2020-01-30 NOTE — PROGRESS NOTES
[facility]  Family Medicine Residency Program  Phone: [phone]  Fax: [phone]    Patient:  Tatianna Haley 67 y.o. female                                 Date of Service: 1/30/20                            Chiefcomplaint:   Chief Complaint   Patient presents with    Atrial Fibrillation         History of Present Illness: The patient is a 67 y.o. female  presented to the clinic with complaints as above. Afib - no palpitations, no CP/SOB. Does have some fatigue with activity particularly when cleaning around her yard. She has recently switched to decaf coffee. Neck spasm - doing better with OTC topical cream.  Roll on product. She is unsure of the name. Not warm/cold. Pain in the neck was b/l in the muscles but did not extend into the back. Pain was occurring when moving the neck. Currently still has some issues with rotation of the neck. No associated sx. Improved with meds, worsened activity. Knot in neck - reports this occurred about the beginning of Dec, was attributed to strain with underlying DJD. She was prescribed flexeril for this and she reports worsening of the neck spasm that she attributes to the med, however her description resembles simply worsening of the neck spasm rather than side effect to the med. Has used a rice bag. History hemorrhagic gastritis in 2015 EGD. Still having GERD sx at this time about 3x per week or more, and when occurring often will be several times that day. She is self treating with honey and vinegar and warm water. No sx of bleeding to report at this time. Review of Systems:   Review of Systems   Respiratory: Negative for chest tightness and shortness of breath. Cardiovascular: Negative for chest pain and palpitations. Gastrointestinal: Positive for abdominal pain. Negative for blood in stool. Musculoskeletal: Positive for arthralgias and myalgias.        Past Medical History:      Diagnosis gastritis on scope last fall with new addition of DOAC. - PREVACID 30 MG delayed release capsule; Take 1 capsule by mouth daily 30-60min prior to your first meal  Dispense: 90 capsule; Refill: 1    3. Neck pain  Stretches given. Return to Office: Return in about 4 weeks (around 2/27/2020) for Kassandra Mitchell. I encourage further reading and education about your health conditions. Information on many healthconditions is provided by the American Academy of Family Physicians: https://familydoctor. org/  Please bring any questions to me at your next visit. This document may have been prepared at least partiallythrough the use of voice recognition software. Although effort is taken to assure the accuracy of this document, it is possible that grammatical, syntax,  or spelling errors may occur. Medication List:    Current Outpatient Medications   Medication Sig Dispense Refill    DIOVAN 40 MG tablet Take 1 tablet by mouth daily Patient desires brand medicine 90 tablet 1    TOPROL XL 50 MG extended release tablet Take 1 tablet by mouth 2 times daily Patient desires brand medicine 180 tablet 1    PREVACID 30 MG delayed release capsule Take 1 capsule by mouth daily 30-60min prior to your first meal 90 capsule 1    apixaban (ELIQUIS) 5 MG TABS tablet Take 1 tablet by mouth 2 times daily 60 tablet 5    Multiple Vitamin (MULTIVITAMIN PO) Take  by mouth. LD 3/6/15       No current facility-administered medications for this visit.          Shawn Queen MD

## 2020-02-03 ENCOUNTER — TELEPHONE (OUTPATIENT)
Dept: FAMILY MEDICINE CLINIC | Age: 80
End: 2020-02-03

## 2020-02-03 RX ORDER — VALSARTAN 40 MG/1
40 TABLET ORAL DAILY
Qty: 90 TABLET | Refills: 1 | Status: SHIPPED | OUTPATIENT
Start: 2020-02-03 | End: 2020-02-04 | Stop reason: SDUPTHER

## 2020-02-03 NOTE — TELEPHONE ENCOUNTER
Insurance won't cover brand name Diovan patient requesting generic be sent to [facility] in [facility].  Please advise

## 2020-02-04 RX ORDER — VALSARTAN 40 MG/1
40 TABLET ORAL DAILY
Qty: 90 TABLET | Refills: 1 | Status: SHIPPED
Start: 2020-02-04 | End: 2020-05-18 | Stop reason: SDUPTHER

## 2020-02-04 RX ORDER — METOPROLOL SUCCINATE 50 MG/1
50 TABLET, EXTENDED RELEASE ORAL 2 TIMES DAILY
Qty: 180 TABLET | Refills: 1 | Status: SHIPPED
Start: 2020-02-04 | End: 2020-05-18 | Stop reason: SDUPTHER

## 2020-02-04 RX ORDER — METOPROLOL SUCCINATE 50 MG
50 TABLET, EXTENDED RELEASE 24 HR ORAL 2 TIMES DAILY
Qty: 180 TABLET | Refills: 1 | Status: CANCELLED | OUTPATIENT
Start: 2020-02-04

## 2020-02-04 NOTE — TELEPHONE ENCOUNTER
Pt would like you to call in  Diovan in generic, states she can't afford it otherwise.   The pharmacy won't just fill the generic

## 2020-02-06 ENCOUNTER — TELEPHONE (OUTPATIENT)
Dept: FAMILY MEDICINE CLINIC | Age: 80
End: 2020-02-06

## 2020-02-06 RX ORDER — POLYETHYLENE GLYCOL 3350 17 G/17G
POWDER, FOR SOLUTION ORAL
Qty: 1530 G | Refills: 1 | Status: SHIPPED
Start: 2020-02-06 | End: 2020-03-04

## 2020-02-06 NOTE — TELEPHONE ENCOUNTER
Patient phoned about constipation - used enema for sx relief. Wants something else to help. Advised miralax with PRN milk of mag.

## 2020-02-10 ENCOUNTER — HOSPITAL ENCOUNTER (OUTPATIENT)
Dept: CARDIOLOGY | Age: 80
Discharge: HOME | End: 2020-02-10
Payer: MEDICARE

## 2020-02-10 LAB
LV EF: 55 %
LVEF MODALITY: NORMAL

## 2020-02-10 PROCEDURE — 93306 TTE W/DOPPLER COMPLETE: CPT | Performed by: PSYCHIATRY & NEUROLOGY

## 2020-02-11 ENCOUNTER — TELEPHONE (OUTPATIENT)
Dept: CARDIOLOGY CLINIC | Age: 80
End: 2020-02-11

## 2020-02-11 NOTE — TELEPHONE ENCOUNTER
Patient notified and instructed on echo results per  [address] [place] [address]. She stated understanding.

## 2020-02-17 ENCOUNTER — TELEPHONE (OUTPATIENT)
Dept: FAMILY MEDICINE CLINIC | Age: 80
End: 2020-02-17

## 2020-02-17 NOTE — TELEPHONE ENCOUNTER
Patient states [facility] needs an order for her to receive her shingles vacc. Looks like she had one in Jan. 2013. I pended an order please sign if appropriate. THANKS!

## 2020-02-24 ENCOUNTER — OFFICE VISIT (OUTPATIENT)
Dept: FAMILY MEDICINE CLINIC | Age: 80
End: 2020-02-24
Payer: MEDICARE

## 2020-02-24 VITALS
RESPIRATION RATE: 18 BRPM | SYSTOLIC BLOOD PRESSURE: 122 MMHG | HEART RATE: 92 BPM | DIASTOLIC BLOOD PRESSURE: 76 MMHG | WEIGHT: 183 LBS | TEMPERATURE: 97.2 F | BODY MASS INDEX: 31.41 KG/M2 | OXYGEN SATURATION: 96 %

## 2020-02-24 PROCEDURE — 1123F ACP DISCUSS/DSCN MKR DOCD: CPT | Performed by: FAMILY MEDICINE

## 2020-02-24 PROCEDURE — G8399 PT W/DXA RESULTS DOCUMENT: HCPCS | Performed by: FAMILY MEDICINE

## 2020-02-24 PROCEDURE — 1036F TOBACCO NON-USER: CPT | Performed by: FAMILY MEDICINE

## 2020-02-24 PROCEDURE — G8417 CALC BMI ABV UP PARAM F/U: HCPCS | Performed by: FAMILY MEDICINE

## 2020-02-24 PROCEDURE — 4040F PNEUMOC VAC/ADMIN/RCVD: CPT | Performed by: FAMILY MEDICINE

## 2020-02-24 PROCEDURE — G8482 FLU IMMUNIZE ORDER/ADMIN: HCPCS | Performed by: FAMILY MEDICINE

## 2020-02-24 PROCEDURE — 1090F PRES/ABSN URINE INCON ASSESS: CPT | Performed by: FAMILY MEDICINE

## 2020-02-24 PROCEDURE — 99214 OFFICE O/P EST MOD 30 MIN: CPT | Performed by: FAMILY MEDICINE

## 2020-02-24 PROCEDURE — G8427 DOCREV CUR MEDS BY ELIG CLIN: HCPCS | Performed by: FAMILY MEDICINE

## 2020-02-24 RX ORDER — POLYETHYLENE GLYCOL 3350
GRANULES (GRAM) MISCELLANEOUS
COMMUNITY
Start: 2020-02-06 | End: 2020-02-24

## 2020-02-24 ASSESSMENT — ENCOUNTER SYMPTOMS
SHORTNESS OF BREATH: 0
CHEST TIGHTNESS: 0
ABDOMINAL PAIN: 0

## 2020-02-24 NOTE — PROGRESS NOTES
controlled    Obesity 5/28/2013    Osteoarthritis     Osteoarthritis of right knee     [place]    Sinus drainage     seasonal (winter)    Spondylosis of lumbosacral joint     Voice absence     only with anxiety       Past Surgical History:        Procedure Laterality Date    COLONOSCOPY  [address]    1 hyperplastic polyp 2mm    HYSTERECTOMY  1990's    AZALEA BSO    JOINT REPLACEMENT Right     Knee    OTHER SURGICAL HISTORY  3/11/2015    EGD with Biopsy    AZALEA AND BSO      1990's AZALEA and BSO    TONSILLECTOMY      age 13    TOTAL KNEE ARTHROPLASTY  2/2012    right    UPPER GASTROINTESTINAL ENDOSCOPY  08       Allergies:    Penicillins; Aspirin; Losartan potassium-hctz; Omeprazole; Protonix [pantoprazole]; and Cyclobenzaprine    Social History:   Social History     Socioeconomic History    Marital status:       Spouse name: Not on file    Number of children: Not on file    Years of education: Not on file    Highest education level: Not on file   Occupational History    Not on file   Social Needs    Financial resource strain: Not on file    Food insecurity:     Worry: Not on file     Inability: Not on file    Transportation needs:     Medical: Not on file     Non-medical: Not on file   Tobacco Use    Smoking status: Never Smoker    Smokeless tobacco: Never Used   Substance and Sexual Activity    Alcohol use: No    Drug use: No    Sexual activity: Not on file   Lifestyle    Physical activity:     Days per week: Not on file     Minutes per session: Not on file    Stress: Not on file   Relationships    Social connections:     Talks on phone: Not on file     Gets together: Not on file     Attends Jainism service: Not on file     Active member of club or organization: Not on file     Attends meetings of clubs or organizations: Not on file     Relationship status: Not on file    Intimate partner violence:     Fear of current or ex partner: Not on file     Emotionally abused: Not on file Physically abused: Not on file     Forced sexual activity: Not on file   Other Topics Concern    Not on file   Social History Narrative    Not on file        Family History:       Problem Relation Age of Onset    Diabetes Mother     Heart Disease Brother     Heart Disease Father        Physical Exam:    Vitals: /76   Pulse 92   Temp 97.2 °F (36.2 °C) (Temporal)   Resp 18   Wt 183 lb (83 kg)   SpO2 96%   BMI 31.41 kg/m²   BP Readings from Last 3 Encounters:   02/24/20 122/76   01/30/20 117/76   01/27/20 115/86     General Appearance: Well developed, awake, alert, oriented, no acute distress  Chest wall/Lung: Clear to auscultation bilaterally,  respirations unlabored. No ronchi/wheezing/rales  Heart[de-identified]  Regular rate and rhythm, S1and S2 normal, no murmur, rub or gallop. Abdomen: Soft, non-tender, bowel sounds normoactive, no masses, no organomegaly  Extremities:  Extremities normal, atraumatic, no cyanosis. +0 edema. Skin: seborrheic keratosis mid/upper L back, photo below. MSK: No ttp neck/traps          Assessment and Plan:         1. Gastroesophageal reflux disease without esophagitis  Seemingly improved (off NSAID?). Patient reluctant to take PPI, still feels that a different PPI may have caused her neck pains. Asked her to bring meds to office for verification. I think she was taking naproxen which likely could have precipitated sx. In the future if she needs to be on NSAIDS we might consider a combo naproxen/PPI pill. 2. Neck pain  improved    3. Essential hypertension  stable    4. Seborrheic keratoses  Observation, remove if becomes irritating/catches on bra etc.        Return to Office: Return in about 3 months (around 5/24/2020) for High Blood Pressure, Gerd. I encourage further reading and education about your health conditions. Information on many healthconditions is provided by the American Academy of Family Physicians: https://familydoctor. org/  Please bring any questions to me at your next visit. This document may have been prepared at least partiallythrough the use of voice recognition software. Although effort is taken to assure the accuracy of this document, it is possible that grammatical, syntax,  or spelling errors may occur. Medication List:    Current Outpatient Medications   Medication Sig Dispense Refill    zoster recombinant adjuvanted vaccine (SHINGRIX) 50 MCG/0.5ML SUSR injection Inject 0.5 mLs into the muscle See Admin Instructions 1 dose now and repeat in 2-6 months 0.5 mL 0    polyethylene glycol (GLYCOLAX) powder 1/2-1 capful (17g) PO daily as needed to prevent constipation sx. 1530 g 1    magnesium hydroxide (MILK OF MAGNESIA) 400 MG/5ML suspension Take 30 mLs by mouth daily as needed for Constipation 1 Bottle 0    metoprolol succinate (TOPROL XL) 50 MG extended release tablet Take 1 tablet by mouth 2 times daily 180 tablet 1    valsartan (DIOVAN) 40 MG tablet Take 1 tablet by mouth daily 90 tablet 1    PREVACID 30 MG delayed release capsule Take 1 capsule by mouth daily 30-60min prior to your first meal 90 capsule 1    apixaban (ELIQUIS) 5 MG TABS tablet Take 1 tablet by mouth 2 times daily 60 tablet 5    Multiple Vitamin (MULTIVITAMIN PO) Take  by mouth. LD 3/6/15       No current facility-administered medications for this visit.          Devon Peralta MD

## 2020-02-28 ENCOUNTER — TELEPHONE (OUTPATIENT)
Dept: PHARMACY | Facility: CLINIC | Age: 80
End: 2020-02-28

## 2020-02-28 ENCOUNTER — CARE COORDINATION (OUTPATIENT)
Dept: CARE COORDINATION | Age: 80
End: 2020-02-28

## 2020-02-28 SDOH — HEALTH STABILITY: PHYSICAL HEALTH: ON AVERAGE, HOW MANY DAYS PER WEEK DO YOU ENGAGE IN MODERATE TO STRENUOUS EXERCISE (LIKE A BRISK WALK)?: 0 DAYS

## 2020-02-28 SDOH — SOCIAL STABILITY: SOCIAL NETWORK: HOW OFTEN DO YOU GET TOGETHER WITH FRIENDS OR RELATIVES?: ONCE A WEEK

## 2020-02-28 SDOH — ECONOMIC STABILITY: INCOME INSECURITY: HOW HARD IS IT FOR YOU TO PAY FOR THE VERY BASICS LIKE FOOD, HOUSING, MEDICAL CARE, AND HEATING?: NOT HARD AT ALL

## 2020-02-28 SDOH — SOCIAL STABILITY: SOCIAL NETWORK
IN A TYPICAL WEEK, HOW MANY TIMES DO YOU TALK ON THE PHONE WITH FAMILY, FRIENDS, OR NEIGHBORS?: MORE THAN THREE TIMES A WEEK

## 2020-02-28 SDOH — ECONOMIC STABILITY: TRANSPORTATION INSECURITY
IN THE PAST 12 MONTHS, HAS THE LACK OF TRANSPORTATION KEPT YOU FROM MEDICAL APPOINTMENTS OR FROM GETTING MEDICATIONS?: NO

## 2020-02-28 SDOH — SOCIAL STABILITY: SOCIAL NETWORK: HOW OFTEN DO YOU ATTENT MEETINGS OF THE CLUB OR ORGANIZATION YOU BELONG TO?: MORE THAN 4 TIMES PER YEAR

## 2020-02-28 SDOH — ECONOMIC STABILITY: FOOD INSECURITY: WITHIN THE PAST 12 MONTHS, YOU WORRIED THAT YOUR FOOD WOULD RUN OUT BEFORE YOU GOT MONEY TO BUY MORE.: NEVER TRUE

## 2020-02-28 SDOH — HEALTH STABILITY: PHYSICAL HEALTH: ON AVERAGE, HOW MANY MINUTES DO YOU ENGAGE IN EXERCISE AT THIS LEVEL?: 0 MIN

## 2020-02-28 SDOH — ECONOMIC STABILITY: FOOD INSECURITY: WITHIN THE PAST 12 MONTHS, THE FOOD YOU BOUGHT JUST DIDN'T LAST AND YOU DIDN'T HAVE MONEY TO GET MORE.: NEVER TRUE

## 2020-02-28 SDOH — SOCIAL STABILITY: SOCIAL NETWORK: ARE YOU MARRIED, WIDOWED, DIVORCED, SEPARATED, NEVER MARRIED, OR LIVING WITH A PARTNER?: WIDOWED

## 2020-02-28 SDOH — SOCIAL STABILITY: SOCIAL NETWORK
DO YOU BELONG TO ANY CLUBS OR ORGANIZATIONS SUCH AS CHURCH GROUPS UNIONS, FRATERNAL OR ATHLETIC GROUPS, OR SCHOOL GROUPS?: YES

## 2020-02-28 SDOH — SOCIAL STABILITY: SOCIAL NETWORK: HOW OFTEN DO YOU ATTEND CHURCH OR RELIGIOUS SERVICES?: MORE THAN 4 TIMES PER YEAR

## 2020-02-28 SDOH — HEALTH STABILITY: MENTAL HEALTH
STRESS IS WHEN SOMEONE FEELS TENSE, NERVOUS, ANXIOUS, OR CAN'T SLEEP AT NIGHT BECAUSE THEIR MIND IS TROUBLED. HOW STRESSED ARE YOU?: NOT AT ALL

## 2020-02-28 SDOH — ECONOMIC STABILITY: TRANSPORTATION INSECURITY
IN THE PAST 12 MONTHS, HAS LACK OF TRANSPORTATION KEPT YOU FROM MEETINGS, WORK, OR FROM GETTING THINGS NEEDED FOR DAILY LIVING?: NO

## 2020-02-28 NOTE — LETTER
2/28/2020    Diaz Rosa  [address] [facility]      Dear Diaz Rosa,    My name is Mikayla Blank and I am a registered nurse who partners with Dao Antoine MD to improve patients' health. Dao Antoine MD believes you would benefit from working with me. As a member of your health care team, I would work with other providers involved in your care, offer education for your specific health conditions, and connect you with additional resources as needed. I will collaborate with Dao Antoine MD to support you in following your treatment plan. The additional support I provide is no additional cost to you. My primary focus is to help you achieve specific goals and improve your health. We are committed to walk with you on this journey and look forward to working with you. Please call me to further discuss your healthcare needs. I am available by phone or for appointments at the office. You can reach me at [phone].     In good health,     Mikayla Blank RN

## 2020-02-28 NOTE — CARE COORDINATION
-[facility] did place a call out to [facility] clinical , Gen Garcia, yesterday to discuss Eliquis cost, as pt had voiced concern over co-pay cost and was denied prescription assistance through [employer]. -[facility] was able to leave Adelia a call and requested call back.  -Adelia did return call later on in the day, but [facility] could not take the call.  -Per Adelia's VM, she states that she did talk to their pharmacist through [facility] and co-pay for 30-day supply would be $45, however, if pt did opt for a 90-day supply through mail order, it would be $125. She would have a $10 savings.   -Adelia states that the cost for this medication co-pay is standard and no lower cost is offered.  -Will await pt's call back to discuss.
health needs, are there any symptoms or problems (risk indicators) you are unsure about that require further investigation?:  No identified areas of uncertainly or problems already being investigated   Are the patients physical health problems impacting on their mental well-being?:  No identified areas of concern   Are there any problems with your patients lifestyle behaviors (alcohol, drugs, diet, exercise) that are impacting on physical or mental well-being?:  No identified areas of concern   Do you have any other concerns about your patients mental well-being? How would you rate their severity and impact on the patient?:  No identified areas of concern   How would you rate their home environment in terms of safety and stability (including domestic violence, insecure housing, neighbor harassment)?:  Consistently safe, supportive, stable, no identified problems   How do daily activities impact on the patient's well-being? (include current or anticipated unemployment, work, caregiving, access to transportation or other):  No identified problems or perceived positive benefits   How would you rate their social network (family, work, friends)?:  Good participation with social networks   How would you rate their financial resources (including ability to afford all required medical care)?:  Financially secure, some resource challenges   How wells does the patient now understand their health and well-being (symptoms, signs or risk factors) and what they need to do to manage their health?:  Reasonable to good understanding and already engages in managing health or is willing to undertake better management   How well do you think your patient can engage in healthcare discussions?  (Barriers include language, deafness, aphasia, alcohol or drug problems, learning difficulties, concentration):  Clear and open communication, no identified barriers   Do other services need to be involved to help this patient?:  Other

## 2020-02-28 NOTE — TELEPHONE ENCOUNTER
Received a referral:  from Care Coordinator to review patients medications. Called patient to schedule a time to speak with a pharmacist over the telephone. No answer left VM: Please contact us at [phone] Option #7 to schedule this appointment. Pharmacists are available Monday thru Friday 7:30 AM till 5:30 PM.    Brina Rinaldi [facility].   Clinical Pharmacy   Toll free: [phone], option 7

## 2020-02-28 NOTE — TELEPHONE ENCOUNTER
JAYNE Luz MD 23 minutes ago (1:26 PM)     Enrolled pt in CC today. I placed a [place] [address] pharmacy consult to review meds and address her questions/concerns about meds. Pt said that the mail order option for her Eliquis will be affordable. She is contacting her insurance about this. Please review my plan of care for the patient and reply if changes needed or you approve of plan by using the smart phrase  [email or web address] by routing or using a quicknote. Thank you!

## 2020-03-04 ENCOUNTER — SCHEDULED TELEPHONE ENCOUNTER (OUTPATIENT)
Dept: PHARMACY | Facility: CLINIC | Age: 80
End: 2020-03-04

## 2020-03-04 RX ORDER — DOCUSATE SODIUM 100 MG/1
100 CAPSULE, LIQUID FILLED ORAL DAILY PRN
COMMUNITY
End: 2020-11-02 | Stop reason: CLARIF

## 2020-03-04 RX ORDER — MULTIVIT,IRON,MINERALS/LUTEIN
1 TABLET ORAL DAILY
COMMUNITY
End: 2022-03-28

## 2020-03-04 NOTE — TELEPHONE ENCOUNTER
CLINICAL PHARMACY NOTE - Medication Review  Patient outreach to review medications - Spoke with patient. Has shelf for medicines - puts them down every morning and then puts them back into cupboard once she has taken for the day (keeps the BID medication out until 2nd dose is taken). SUBJECTIVE/OBJECTIVE:   Kristi Davenport is a 67 y.o. female referred to a clinical pharmacy specialist by care coordination. Medications:  Current Outpatient Medications   Medication Sig Dispense Refill    zoster recombinant adjuvanted vaccine [facility]) 50 MCG/0.5ML SUSR injection    *rec'd 1st dose 2/19/20   Inject 0.5 mLs into the muscle See Admin Instructions 1 dose now and repeat in 2-6 months 0.5 mL 0    polyethylene glycol (GLYCOLAX) powder    *sts hasn't used/opened - removed from list for now   1/2-1 capful (17g) PO daily as needed to prevent constipation sx. 1530 g 1    magnesium hydroxide (MILK OF MAGNESIA) 400 MG/5ML suspension    *used x1 with relief; also uses docusate and/or prune juice prn - added docusate to med list   Take 30 mLs by mouth daily as needed for Constipation 1 Bottle 0    metoprolol succinate (TOPROL XL) 50 MG extended release tablet Take 1 tablet by mouth 2 times daily 180 tablet 1    valsartan (DIOVAN) 40 MG tablet Take 1 tablet by mouth daily 90 tablet 1    PREVACID 30 MG delayed release capsule    *has taken 2 doses since she rec'd   Take 1 capsule by mouth daily 30-60min prior to your first meal 90 capsule 1    apixaban (ELIQUIS) 5 MG TABS tablet    *only taking one time daily   Take 1 tablet by mouth 2 times daily 60 tablet 5    Multiple Vitamin (MULTIVITAMIN PO)    *Alive women's vitamin   Take  by mouth.  LD 3/6/15       Additional Medications (including OTC/Herbal Supplements):  · States she rotates her vitamins, so may take all of them or none of them on a given day; buys what is on sale, so strengths likely vary at times  · Cranberry - currently 4200mg  · Lutein and beta  Pneumococcal Polysaccharide ([id number]) 10/08/2013    Tdap (Boostrix, Adacel) 11/29/2016    Zoster Live (Zostavax) 01/01/2013       ASSESSMENT/PLAN:   · Increase Eliquis to BID as prescribed (patient inadvertently thought was one time daily)  · Cost: currently plans to keep Eliquis at local pharmacy (sts $10 isn't that big of a difference and prefers to get out to pharmacy)  · Drug interactions: The following clinically significant interactions were identified via [employer] Interaction Analysis as category D or higher: none. · Renal dosing: No renal adjustments necessary. · Immunizations: reconciled Shingrix 1st dose to appear on immunization hx; patient aware next dose due after mid-April  · Blood pressure: below target  · Patient does report fatigue - reviewed that metoprolol (beta-blocker family, which is prescribed for her afib) may contribute - often worse when starting therapy or increasing dose, but often our body accommodates. Reports she also recently started a vitamin B complex, which she thinks has helped. · @25min on phone with patient reviewing and education; Updated medication list with OTC/supplments  · Discussed cranberry dose and may consider lower dose to make sure any potential ADR (GI sx, kidney stones) minimized  · Discussed potential of vitamin E to increase risk of bruising/bleeding with Eliquis (although is currently using low dose of vit E) - patient plans to stop vitamin E    Kaycee Simi Bautista PharmD, [address]  Direct: [phone]  Department, toll free: [phone], option 7     =======================================================   For Pharmacy Admin Tracking Only    PHSO: Yes  Total # of Interventions Recommended: 4  - Increased Dose #: 1  - Discontinued Medication #: 1 Discontinue Reason(s): Interaction  - Updated Order #: 1 Updated Order Reason(s):  Other  Recommended intervention potential cost savings: 1  Total Interventions Accepted: 4  Time Spent (min): 35

## 2020-03-11 ENCOUNTER — CARE COORDINATION (OUTPATIENT)
Dept: CARE COORDINATION | Age: 80
End: 2020-03-11

## 2020-03-13 ENCOUNTER — CARE COORDINATION (OUTPATIENT)
Dept: CARE COORDINATION | Age: 80
End: 2020-03-13

## 2020-03-13 ENCOUNTER — TELEPHONE (OUTPATIENT)
Dept: FAMILY MEDICINE CLINIC | Age: 80
End: 2020-03-13

## 2020-03-13 NOTE — CARE COORDINATION
coordination  -Encourage BS and BP checks and document trends  -Reinforce DM zones and continue to educate on worrisome s/sx of condition  -Encourage pt to reach out to [facility] if she decides she wants to do mail order to save money on prescription~contact number given to her again today.  -Pt to get back to this ACM if she can't find BS log that ACM included in her mailed out packet. Care Coordination Interventions    Program Enrollment:  Complex Care  Referral from Primary Care Provider:  No  Suggested Interventions and Community Resources  Medication Assistance Program:  Completed (Comment: Eliquis denial-letter recieved (pt did on her own))  Pharmacist:  Completed (Comment: 2/28/2020-med review/education)  Zone Management Tools:  Completed (Comment: DM 2/28/2020 discussed and mailed info)  Other Services or Interventions:  [facility] mail order pharmacy-pt to call if she wants to switch Eliquis to mail order for a $10 saving vs filling at a local [phone]         Goals Addressed                 This Visit's Progress     COMPLETED: Community Resource Goal        ACM will complete referral for a [place] [id number] clinical pharmacist for med review/education and address questions/concerns regarding medication. .    Barriers: time constraints and lack of education  Plan for overcoming my barriers: ACM explained and offered pharmacist resource to address her medication questions/concerns  Confidence: 8/10  Anticipated Goal Completion Date: 4/1/2020       Conditions and Symptoms   On track     I will schedule office visits, as directed by my provider. I will keep my appointment or reschedule if I have to cancel. I will notify my provider of any barriers to my plan of care. I will follow my Zone Management tool to seek urgent or emergent care. I will notify my provider of any symptoms that indicate a worsening of my condition.     Barriers: lack of motivation and time constraints  Plan for overcoming my barriers: Encouraged pt to at least check daily FBS (alternating between FBS and PM checks). Educate on DM zones. Educate on target BS levels and A1c level. Confidence: 9/10  Anticipated Goal Completion Date: 5/28/2020         Medication Management   On track     I will take my medication as directed. I will notify my provider of any problems with medications, like adverse effects or side effects. I will notify my provider/Care Coordinator if I am unable to afford my medications. I will notify my provider for advice before I stop taking any of my medication. Barriers: lack of support, stress, medication side effects and lack of education  Plan for overcoming my barriers: ACM to contact [facility] for assistance with medication cost. Provide information about applying for [facility] for help with med costs. Confidence: 6/10  Anticipated Goal Completion Date: 5/28/2020       Self Monitoring        Self-Monitored Blood Glucose - I will check my blood sugar Fasting blood sugar  I will notify my provider of any trends of increasing or decreasing blood sugars over a 1 month period. I will notify my provider if I have any blood sugar readings less than 70 more than 2 times a month. Barriers: lack of motivation and lack of education  Plan for overcoming my barriers: Encourage pt to self-monitor FBS at least daily and document on log. Educate pt on DM zones and when to call her provider for any worsening in her condition. Confidence: 9/10  Anticipated Goal Completion Date: 5/13/2020      Blood Pressure - I will take my blood pressure as directed - Daily  I will notify my provider of any trends of increasing or decreasing blood pressures over a month period of time. I will notify my provider of any changes in blood pressure associated with symptoms of dizziness, falls, passing out, headache, confusion/change in mental status.       Barriers: lack of motivation and lack of education  Plan for overcoming my barriers: Encourage pt to check BPs daily and document trends on log. Educate pt on worrisome s/sx of condition that she should notify her provider of. Confidence: 9/10  Anticipated Goal Completion Date: 5/13/2020            Prior to Admission medications    Medication Sig Start Date End Date Taking?  Authorizing Provider   docusate sodium (COLACE) 100 MG capsule Take 100 mg by mouth daily as needed for Constipation    Historical Provider, MD   CRANBERRY PO Take by mouth daily    Historical Provider, MD   Multiple Vitamins-Minerals (EYE VITAMINS PO) Take by mouth daily    Historical Provider, MD   Cinnamon 500 MG TABS Take by mouth daily    Historical Provider, MD   Multiple Minerals-Vitamins (CALCIUM-MAGNESIUM-ZINC-D3) TABS Take by mouth daily    Historical Provider, MD   B Complex Vitamins (VITAMIN B COMPLEX PO) Take by mouth daily    Historical Provider, MD   zoster recombinant adjuvanted vaccine [facility]) 50 MCG/0.5ML SUSR injection Inject 0.5 mLs into the muscle See Admin Instructions 1 dose now and repeat in 2-6 months 2/17/20   Braydon Fish MD   magnesium hydroxide (MILK OF MAGNESIA) 400 MG/5ML suspension Take 30 mLs by mouth daily as needed for Constipation  Patient not taking: Reported on 3/4/2020 2/6/20   Braydon Fish MD   metoprolol succinate (TOPROL XL) 50 MG extended release tablet Take 1 tablet by mouth 2 times daily 2/4/20   Braydon Fish MD   valsartan (DIOVAN) 40 MG tablet Take 1 tablet by mouth daily 2/4/20   Braydon Fish MD   PREVACID 30 MG delayed release capsule Take 1 capsule by mouth daily 30-60min prior to your first meal 1/30/20   Braydon Fish MD   [facility]) 5 MG TABS tablet Take 1 tablet by mouth 2 times daily 1/7/20   Ashli Quispe MD   Multiple Vitamin (MULTIVITAMIN PO) Take by mouth daily     Historical Provider, MD       Future Appointments   Date Time Provider Rosemary Song   5/18/2020  9:45 AM Braydon Fish MD [facility]      and   Diabetes Assessment Medic Alert ID:  No  Meal Planning:  Avoidance of concentrated sweets   How often do you test your blood sugar?:  Daily   Do you have barriers with adherence to non-pharmacologic self-management interventions?  (Nutrition/Exercise/Self-Monitoring):  Yes   Have you ever had to go to the ED for symptoms of low blood sugar?:  No       No patient-reported symptoms   Do you have hyperglycemia symptoms?:  No   Do you have hypoglycemia symptoms?:  No   Last Blood Sugar Value:  155   Blood Sugar Monitoring Regimen:  Morning Fasting   Blood Sugar Trends:  Steady Increase

## 2020-03-18 ENCOUNTER — CARE COORDINATION (OUTPATIENT)
Dept: CARE COORDINATION | Age: 80
End: 2020-03-18

## 2020-03-24 ENCOUNTER — CARE COORDINATION (OUTPATIENT)
Dept: CARE COORDINATION | Age: 80
End: 2020-03-24

## 2020-03-24 NOTE — CARE COORDINATION
Ambulatory Care Coordination Note  3/24/2020  CM Risk Score: 1  Charlson 10 Year Mortality Risk Score: 53%     ACC: Alejandro Gallardo RN    Summary Note:     *DM  -Condition remains at baseline.  -Most recent A1c 6.4  -Pt trying to check FBS daily  -Most recent FBS from this week was 131.  -States that most generally they are ranging between 130-150s. -Highest reading recently has been 171.   -States that she knows that her higher readings are d/t eating wrong. Has been getting into peanut butter cookies and hamzah food cake. States that she has gotten rid of these and has been watching more closely with her diet. -States that she has the information that this [facility] gave her on diet and My Plate and has been reviewing.  -Denies any hypo/hyperglycemic episodes. -Reviewed DM zones and processed with her worsening s/sx of condition. *HTN  -Has been starting to check her BPs.  -Pt only had 2 readings. 123/93 and 129/88 from last week. -Will start checking this week and documenting.  -Denies any s/sx of elevated BP.    *COVID-19  -Pt states that her brother is currently in the ED d/t fever.  -She is concerned that he may be infected. -She states that her brother's son-in-law has been sick and visited her brother last week and possibly got him sick. -[facility] reviewed s/sx of COVID-19 and provided her information on the COVID-19 hotline. Referred her to this hotline for questions or if she felt that she was having symptoms.  -Encouraged her to go straight to the ED if symptoms were severe. She voiced understanding. -[facility] offered to give her information on the virtual visits through [address] [place] [address], but she was not interested. Plan  -Care coordination  -Reinforce DM zones  -Encourage daily BP checks and document.  -Consider graduating soon, as no other needs are identified.         Care Coordination Interventions    Program Enrollment:  Complex Care  Referral from Primary Care Provider:  No  Suggested Interventions and Community Resources  Medication Assistance Program:  Completed (Comment: Eliquis denial-letter recieved (pt did on her own))  Pharmacist:  Completed (Comment: 2/28/2020-med review/education)  Zone Management Tools:  Completed (Comment: DM 2/28/2020 discussed and mailed info)  Other Services or Interventions:  [facility] mail order pharmacy-pt to call if she wants to switch Eliquis to mail order for a $10 saving vs filling at a local [phone]         Goals Addressed                 This Visit's Progress     Conditions and Symptoms   On track     I will schedule office visits, as directed by my provider. I will keep my appointment or reschedule if I have to cancel. I will notify my provider of any barriers to my plan of care. I will follow my Zone Management tool to seek urgent or emergent care. I will notify my provider of any symptoms that indicate a worsening of my condition. Barriers: lack of motivation and time constraints  Plan for overcoming my barriers: Encouraged pt to at least check daily FBS (alternating between FBS and PM checks). Educate on DM zones. Educate on target BS levels and A1c level. Confidence: 9/10  Anticipated Goal Completion Date: 5/28/2020         Medication Management   On track     I will take my medication as directed. I will notify my provider of any problems with medications, like adverse effects or side effects. I will notify my provider/Care Coordinator if I am unable to afford my medications. I will notify my provider for advice before I stop taking any of my medication. Barriers: lack of support, stress, medication side effects and lack of education  Plan for overcoming my barriers: ACM to contact [facility] for assistance with medication cost. Provide information about applying for [facility] for help with med costs.   Confidence: 6/10  Anticipated Goal Completion Date: 5/28/2020       Self Monitoring   On track     Self-Monitored Blood Glucose - I will check my blood sugar

## 2020-04-06 ENCOUNTER — CARE COORDINATION (OUTPATIENT)
Dept: CARE COORDINATION | Age: 80
End: 2020-04-06

## 2020-04-06 NOTE — CARE COORDINATION
A1C in office if at appt  A1C in lab (will enter) if doing virtual visit.
daily    Historical Provider, MD   Multiple Minerals-Vitamins (CALCIUM-MAGNESIUM-ZINC-D3) TABS Take by mouth daily    Historical Provider, MD   B Complex Vitamins (VITAMIN B COMPLEX PO) Take by mouth daily    Historical Provider, MD   zoster recombinant adjuvanted vaccine [facility]) 50 MCG/0.5ML SUSR injection Inject 0.5 mLs into the muscle See Admin Instructions 1 dose now and repeat in 2-6 months 2/17/20   Sonali Hassan MD   magnesium hydroxide (MILK OF MAGNESIA) 400 MG/5ML suspension Take 30 mLs by mouth daily as needed for Constipation  Patient not taking: Reported on 3/4/2020 2/6/20   Sonali Hassan MD   metoprolol succinate (TOPROL XL) 50 MG extended release tablet Take 1 tablet by mouth 2 times daily 2/4/20   Sonali Hassan MD   valsartan (DIOVAN) 40 MG tablet Take 1 tablet by mouth daily 2/4/20   Sonali Hassan MD   PREVACID 30 MG delayed release capsule Take 1 capsule by mouth daily 30-60min prior to your first meal 1/30/20   Sonali Hassan MD   apixaban Angelica Kalata) 5 MG TABS tablet Take 1 tablet by mouth 2 times daily 1/7/20   Adalid Mccarthy MD   Multiple Vitamin (MULTIVITAMIN PO) Take by mouth daily     Historical Provider, MD       Future Appointments   Date Time Provider Rosemary Song   5/18/2020  9:45 AM Sonali Hassan MD [facility]      and   Diabetes Assessment    Medic Alert ID:  No  Meal Planning:  Avoidance of concentrated sweets   How often do you test your blood sugar?:  Daily   Do you have barriers with adherence to non-pharmacologic self-management interventions?  (Nutrition/Exercise/Self-Monitoring):  Yes   Have you ever had to go to the ED for symptoms of low blood sugar?:  No       Increase or Decrease trend in Blood Sugars   Do you have hyperglycemia symptoms?:  No   Do you have hypoglycemia symptoms?:  No   Last Blood Sugar Value:  150   Blood Sugar Monitoring Regimen:  Morning Fasting   Blood Sugar Trends:  Steady Increase

## 2020-04-24 ENCOUNTER — CARE COORDINATION (OUTPATIENT)
Dept: CARE COORDINATION | Age: 80
End: 2020-04-24

## 2020-04-24 NOTE — CARE COORDINATION
Ambulatory Care Coordination Note  4/24/2020  CM Risk Score: 1  Charlson 10 Year Mortality Risk Score: 68%     ACC: Porter Franz RN    Summary Note:     *DM  -Condition remains stable per pt. -Checks BS trends a few times a week. -FBS ranging between 140-150s  -Does admit that higher BS trends are related to when she eats a piece of dark chocolate  -Denies any hypo/hyperglycemic episodes. -Reviewed DM zones with pt and she verbalizes understanding of this information.   -Pt will keep her OV appointment for next month on 5/18. Will come in person to do OV, does not want to do VVs. She will have her A1c drawn at the OV next month. -Most recent A1c 6.4. *HTN  -Does check her BP trends at home a few times a week  -SBP ranging between 130-140s and DBP ranging between upper 70-80s. -Denies any s/sx of elevated bp  -Reports compliance with all medication. States that she does not miss any doses. *Graduation  -No identified needs at this time  -Pt does not voice any further needs  -Pt ready for graduation from care coordination. Pt agreeable with plan.   -Pt encouraged to call this ACM if further needs arise    Plan  -Graduate  -Update pcp with plan            Care Coordination Interventions    Program Enrollment:  Complex Care  Referral from Primary Care Provider:  No  Suggested Interventions and Community Resources  Medication Assistance Program:  Completed (Comment: Eliquis denial-letter recieved (pt did on her own))  Pharmacist:  Completed (Comment: 2/28/2020-med review/education)  Registered Dietician:  Declined (Comment: 4/6/20 Offered CC RD-pt declined)  Zone Management Tools:  Completed (Comment: DM 2/28/2020 discussed and mailed info)  Other Services or Interventions:  [facility] mail order pharmacy-pt to call if she wants to switch Eliquis to mail order for a $10 saving vs filling at a local [phone]         Goals Addressed                 This Visit's Progress     Conditions and Symptoms   On track     I will schedule office visits, as directed by my provider. I will keep my appointment or reschedule if I have to cancel. I will notify my provider of any barriers to my plan of care. I will follow my Zone Management tool to seek urgent or emergent care. I will notify my provider of any symptoms that indicate a worsening of my condition. Barriers: lack of motivation and time constraints  Plan for overcoming my barriers: Encouraged pt to at least check daily FBS (alternating between FBS and PM checks). Educate on DM zones. Educate on target BS levels and A1c level. Confidence: 9/10  Anticipated Goal Completion Date: 5/28/2020         Medication Management   On track     I will take my medication as directed. I will notify my provider of any problems with medications, like adverse effects or side effects. I will notify my provider/Care Coordinator if I am unable to afford my medications. I will notify my provider for advice before I stop taking any of my medication. Barriers: lack of support, stress, medication side effects and lack of education  Plan for overcoming my barriers: ACM to contact [facility] for assistance with medication cost. Provide information about applying for [facility] for help with med costs. Confidence: 6/10  Anticipated Goal Completion Date: 5/28/2020       Self Monitoring   On track     Self-Monitored Blood Glucose - I will check my blood sugar Fasting blood sugar  I will notify my provider of any trends of increasing or decreasing blood sugars over a 1 month period. I will notify my provider if I have any blood sugar readings less than 70 more than 2 times a month. Barriers: lack of motivation and lack of education  Plan for overcoming my barriers: Encourage pt to self-monitor FBS at least daily and document on log. Educate pt on DM zones and when to call her provider for any worsening in her condition.   Confidence: 9/10  Anticipated Goal Completion Date: 5/13/2020      Blood Pressure - I will take my blood pressure as directed - Daily  I will notify my provider of any trends of increasing or decreasing blood pressures over a month period of time. I will notify my provider of any changes in blood pressure associated with symptoms of dizziness, falls, passing out, headache, confusion/change in mental status. Barriers: lack of motivation and lack of education  Plan for overcoming my barriers: Encourage pt to check BPs daily and document trends on log. Educate pt on worrisome s/sx of condition that she should notify her provider of. Confidence: 9/10  Anticipated Goal Completion Date: 5/13/2020            Prior to Admission medications    Medication Sig Start Date End Date Taking?  Authorizing Provider   Coenzyme Q10 (COQ-10) 200 MG CAPS Take 1 capsule by mouth daily    Historical Provider, MD   docusate sodium (COLACE) 100 MG capsule Take 100 mg by mouth daily as needed for Constipation    Historical Provider, MD   CRANBERRY PO Take by mouth daily    Historical Provider, MD   Multiple Vitamins-Minerals (EYE VITAMINS PO) Take by mouth daily    Historical Provider, MD   Cinnamon 500 MG TABS Take by mouth daily    Historical Provider, MD   Multiple Minerals-Vitamins (CALCIUM-MAGNESIUM-ZINC-D3) TABS Take by mouth daily    Historical Provider, MD   B Complex Vitamins (VITAMIN B COMPLEX PO) Take by mouth daily    Historical Provider, MD   zoster recombinant adjuvanted vaccine [facility]) 50 MCG/0.5ML SUSR injection Inject 0.5 mLs into the muscle See Admin Instructions 1 dose now and repeat in 2-6 months 2/17/20   Francesco Garcia MD   magnesium hydroxide (MILK OF MAGNESIA) 400 MG/5ML suspension Take 30 mLs by mouth daily as needed for Constipation  Patient not taking: Reported on 3/4/2020 2/6/20   Francesco Garcia MD   metoprolol succinate (TOPROL XL) 50 MG extended release tablet Take 1 tablet by mouth 2 times daily 2/4/20   Francesco Garcia MD   valsartan (DIOVAN) 40 MG tablet Take 1 tablet by mouth daily 2/4/20   Jackie Davila MD   PREVACID 30 MG delayed release capsule Take 1 capsule by mouth daily 30-60min prior to your first meal 1/30/20   Jackie Davila MD   apixaban Elle Lorenzo) 5 MG TABS tablet Take 1 tablet by mouth 2 times daily 1/7/20   Javier Atnoine MD   Multiple Vitamin (MULTIVITAMIN PO) Take by mouth daily     Historical Provider, MD       Future Appointments   Date Time Provider Rosemary Nohemi   5/18/2020  9:45 AM Jackie Davila MD [facility]      and   Diabetes Assessment    Medic Alert ID:  No  Meal Planning:  Avoidance of concentrated sweets   How often do you test your blood sugar?:  Daily   Do you have barriers with adherence to non-pharmacologic self-management interventions?  (Nutrition/Exercise/Self-Monitoring):  Yes   Have you ever had to go to the ED for symptoms of low blood sugar?:  No       No patient-reported symptoms   Do you have hyperglycemia symptoms?:  No   Do you have hypoglycemia symptoms?:  No   Last Blood Sugar Value:  155   Blood Sugar Monitoring Regimen:  Morning Fasting   Blood Sugar Trends:  Fluctuating

## 2020-05-18 ENCOUNTER — OFFICE VISIT (OUTPATIENT)
Dept: FAMILY MEDICINE CLINIC | Age: 80
End: 2020-05-18
Payer: MEDICARE

## 2020-05-18 VITALS
BODY MASS INDEX: 31.07 KG/M2 | OXYGEN SATURATION: 97 % | DIASTOLIC BLOOD PRESSURE: 76 MMHG | HEART RATE: 86 BPM | HEIGHT: 64 IN | WEIGHT: 182 LBS | SYSTOLIC BLOOD PRESSURE: 119 MMHG | RESPIRATION RATE: 18 BRPM | TEMPERATURE: 97.6 F

## 2020-05-18 LAB — HBA1C MFR BLD: 6.1 %

## 2020-05-18 PROCEDURE — 4040F PNEUMOC VAC/ADMIN/RCVD: CPT | Performed by: FAMILY MEDICINE

## 2020-05-18 PROCEDURE — 1036F TOBACCO NON-USER: CPT | Performed by: FAMILY MEDICINE

## 2020-05-18 PROCEDURE — G8427 DOCREV CUR MEDS BY ELIG CLIN: HCPCS | Performed by: FAMILY MEDICINE

## 2020-05-18 PROCEDURE — G8399 PT W/DXA RESULTS DOCUMENT: HCPCS | Performed by: FAMILY MEDICINE

## 2020-05-18 PROCEDURE — 1123F ACP DISCUSS/DSCN MKR DOCD: CPT | Performed by: FAMILY MEDICINE

## 2020-05-18 PROCEDURE — G8417 CALC BMI ABV UP PARAM F/U: HCPCS | Performed by: FAMILY MEDICINE

## 2020-05-18 PROCEDURE — 1090F PRES/ABSN URINE INCON ASSESS: CPT | Performed by: FAMILY MEDICINE

## 2020-05-18 PROCEDURE — 83036 HEMOGLOBIN GLYCOSYLATED A1C: CPT | Performed by: FAMILY MEDICINE

## 2020-05-18 PROCEDURE — 99214 OFFICE O/P EST MOD 30 MIN: CPT | Performed by: FAMILY MEDICINE

## 2020-05-18 RX ORDER — METOPROLOL SUCCINATE 50 MG/1
50 TABLET, EXTENDED RELEASE ORAL 2 TIMES DAILY
Qty: 180 TABLET | Refills: 1 | Status: SHIPPED
Start: 2020-05-18 | End: 2020-10-23 | Stop reason: SDUPTHER

## 2020-05-18 RX ORDER — VALSARTAN 40 MG/1
40 TABLET ORAL DAILY
Qty: 90 TABLET | Refills: 1 | Status: SHIPPED
Start: 2020-05-18 | End: 2020-05-19

## 2020-05-18 ASSESSMENT — ENCOUNTER SYMPTOMS
SHORTNESS OF BREATH: 0
ABDOMINAL PAIN: 0
CHEST TIGHTNESS: 0

## 2020-05-18 NOTE — PROGRESS NOTES
HISTORY  3/11/2015    EGD with Biopsy    AZALEA AND BSO      1990's AZALEA and BSO    TONSILLECTOMY      age 13    TOTAL KNEE ARTHROPLASTY  2/2012    right    UPPER GASTROINTESTINAL ENDOSCOPY  08       Allergies:    Penicillins; Aspirin; Losartan potassium-hctz; Omeprazole; Protonix [pantoprazole]; and Cyclobenzaprine    Social History:   Social History     Socioeconomic History    Marital status:      Spouse name: Not on file    Number of children: 0    Years of education: 6    Highest education level: Associate degree: occupational, technical, or vocational program   Occupational History    Occupation: Applandician   Social Needs    Financial resource strain: Not hard at all   [employer] insecurity     Worry: Never true     Inability: Never true    Transportation needs     Medical: No     Non-medical: No   Tobacco Use    Smoking status: Never Smoker    Smokeless tobacco: Never Used   Substance and Sexual Activity    Alcohol use: No    Drug use: No    Sexual activity: Not Currently   Lifestyle    Physical activity     Days per week: 0 days     Minutes per session: 0 min    Stress: Not at all   Relationships    Social connections     Talks on phone: More than three times a week     Gets together: Once a week     Attends Anabaptist service: More than 4 times per year     Active member of club or organization: Yes     Attends meetings of clubs or organizations: More than 4 times per year     Relationship status:      Intimate partner violence     Fear of current or ex partner: Not on file     Emotionally abused: Not on file     Physically abused: Not on file     Forced sexual activity: Not on file   Other Topics Concern    Not on file   Social History Narrative    Not on file        Family History:       Problem Relation Age of Onset    Diabetes Mother     Heart Disease Brother     Heart Disease Father        Physical Exam:    Vitals: /76   Pulse 86   Temp 97.6 °F (36.4 °C)   Resp 18

## 2020-05-19 ENCOUNTER — TELEPHONE (OUTPATIENT)
Dept: FAMILY MEDICINE CLINIC | Age: 80
End: 2020-05-19

## 2020-05-19 RX ORDER — IRBESARTAN 75 MG/1
75 TABLET ORAL DAILY
Qty: 90 TABLET | Refills: 1 | Status: SHIPPED
Start: 2020-05-19 | End: 2020-10-22

## 2020-05-19 NOTE — TELEPHONE ENCOUNTER
[facility] pharmacy fax: Valsartan on national backorder, unsure when it will become available.   Can you please change to a different medication

## 2020-06-26 RX ORDER — BLOOD-GLUCOSE METER
EACH MISCELLANEOUS
Qty: 1 KIT | OUTPATIENT
Start: 2020-06-26

## 2020-06-29 ENCOUNTER — HOSPITAL ENCOUNTER (OUTPATIENT)
Dept: MAMMOGRAPHY | Age: 80
Discharge: HOME | End: 2020-07-01
Payer: MEDICARE

## 2020-06-29 PROCEDURE — 77067 SCR MAMMO BI INCL CAD: CPT

## 2020-07-12 ENCOUNTER — HOSPITAL ENCOUNTER (EMERGENCY)
Age: 80
Discharge: HOME | End: 2020-07-12
Payer: MEDICARE

## 2020-07-12 ENCOUNTER — APPOINTMENT (OUTPATIENT)
Dept: GENERAL RADIOLOGY | Age: 80
End: 2020-07-12
Payer: MEDICARE

## 2020-07-12 ENCOUNTER — APPOINTMENT (OUTPATIENT)
Dept: CT IMAGING | Age: 80
End: 2020-07-12
Payer: MEDICARE

## 2020-07-12 ENCOUNTER — APPOINTMENT (OUTPATIENT)
Dept: ULTRASOUND IMAGING | Age: 80
End: 2020-07-12
Payer: MEDICARE

## 2020-07-12 VITALS
RESPIRATION RATE: 16 BRPM | DIASTOLIC BLOOD PRESSURE: 91 MMHG | OXYGEN SATURATION: 99 % | TEMPERATURE: 97.5 F | HEART RATE: 98 BPM | SYSTOLIC BLOOD PRESSURE: 173 MMHG

## 2020-07-12 PROCEDURE — 96372 THER/PROPH/DIAG INJ SC/IM: CPT

## 2020-07-12 PROCEDURE — 72131 CT LUMBAR SPINE W/O DYE: CPT

## 2020-07-12 PROCEDURE — 73560 X-RAY EXAM OF KNEE 1 OR 2: CPT

## 2020-07-12 PROCEDURE — 6360000002 HC RX W HCPCS: Performed by: NURSE PRACTITIONER

## 2020-07-12 PROCEDURE — 93971 EXTREMITY STUDY: CPT

## 2020-07-12 PROCEDURE — 99283 EMERGENCY DEPT VISIT LOW MDM: CPT

## 2020-07-12 RX ORDER — DEXAMETHASONE SODIUM PHOSPHATE 10 MG/ML
8 INJECTION INTRAMUSCULAR; INTRAVENOUS ONCE
Status: COMPLETED | OUTPATIENT
Start: 2020-07-12 | End: 2020-07-12

## 2020-07-12 RX ORDER — ACETAMINOPHEN 500 MG
500 TABLET ORAL EVERY 6 HOURS PRN
Qty: 20 TABLET | Refills: 0 | Status: SHIPPED | OUTPATIENT
Start: 2020-07-12

## 2020-07-12 RX ORDER — METHYLPREDNISOLONE 4 MG/1
TABLET ORAL
Qty: 1 KIT | Status: SHIPPED | OUTPATIENT
Start: 2020-07-12 | End: 2020-07-18

## 2020-07-12 RX ADMIN — DEXAMETHASONE SODIUM PHOSPHATE 8 MG: 10 INJECTION INTRAMUSCULAR; INTRAVENOUS at 12:29

## 2020-07-12 NOTE — ED PROVIDER NOTES
[facility]         Department of Emergency Medicine   ED  Provider Note  Admit Date/RoomTime: 7/12/2020 11:31 AM  ED Room: 30/30  MRN: [id number]  Chief Complaint: Knee Pain (right knee, pain radiates around to the buttocks, states she cant walk, on Eliquis)       History of Present Illness   Source of history provided by:  patient. History/Exam Limitations: none. Des Hare is a 79 y.o. female who has a past medical history of:   Past Medical History:   Diagnosis Date    Arthritis     Bilateral carpal tunnel syndrome     right more than left    Diabetes mellitus ([facility] [address]     diet controlled & cinnamon    DM2 (diabetes mellitus, type 2) ([facility] [address] 2/16/2012    patient states not diabetic, family hx of diabetes; A1C-6.2    Endometrial cancer ([facility] [address] 1992    early small cured    GERD (gastroesophageal reflux disease)     HTN (hypertension) 2/16/2012    Hyperlipidemia     borderline- no meds    Hypertension     controlled    Obesity 5/28/2013    Osteoarthritis     Osteoarthritis of right knee     Marshal Meigs    Sinus drainage     seasonal (winter)    Spondylosis of lumbosacral joint     Voice absence     only with anxiety    presents to the ED by private car and is alone for complaints of right knee pain and states the pain in her lower back radiates to her right buttock and wraps around her right thigh, beginning few days prior to arrival and are gradually worsening since that time. Patient reports she had her right knee replaced approximately 9 years ago by Dr. Marshal Meigs. She reports if she sits the wrong way the pain is worse and is having a hard time bending. She denies any injury. The complaint has been persistent, moderate in severity. 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 Reports that the pain is worse with going up and down steps.   She denies any loss of bladder bowel function denies any rectal numbness denies any fever, chills, chest pain, shortness of breath, dizziness, palpitations, hemoptysis or paresthesias in the extremities. The patient is on Eliquis and has a history of atrial fibrillation. She denies any injuries. Patient normally ambulates with a cane and arrived in a wheelchair. ROS    Pertinent positives and negatives are stated within HPI, all other systems reviewed and are negative. Past Surgical History:   Procedure Laterality Date    COLONOSCOPY  [address] [place]    1 hyperplastic polyp 2mm    HYSTERECTOMY  1990's    AZALEA BSO    JOINT REPLACEMENT Right     Knee    OTHER SURGICAL HISTORY  3/11/2015    EGD with Biopsy    AZALEA AND BSO      1990's AZALEA and BSO    TONSILLECTOMY      age 16    TOTAL KNEE ARTHROPLASTY  2/2012    right    UPPER GASTROINTESTINAL ENDOSCOPY  08   Social History:  reports that she has never smoked. She has never used smokeless tobacco. She reports that she does not drink alcohol or use drugs. Family History: family history includes Diabetes in her mother; Heart Disease in her brother and father. Allergies: Penicillins; Aspirin; Losartan potassium-hctz; Omeprazole; Protonix [pantoprazole]; and Cyclobenzaprine    Physical Exam   Oxygen Saturation Interpretation: Normal.   ED Triage Vitals   BP Temp Temp Source Pulse Resp SpO2 Height Weight   07/12/20 1130 07/12/20 1126 07/12/20 1126 07/12/20 1126 07/12/20 1126 07/12/20 1126 -- --   (!) 173/91 97.5 °F (36.4 °C) Oral 98 16 99 %         Physical Exam  · Constitutional/General: Alert and oriented x3, well appearing, non toxic  · HEENT:  NC/NT. PERRLA,  Airway patent. · Neck: Supple, full ROM, non tender to palpation in the midline, no stridor, no crepitus, no meningeal signs  · Respiratory: Lungs clear to auscultation bilaterally, no wheezes, rales, or rhonchi. Not in respiratory distress  · CV:  Irregular controlled rate. Regular rhythm. No murmurs, gallops, or rubs.  2+ distal pulses  · Chest: No chest wall tenderness  · GI:  Abdomen Soft, Non tender, Non distended. +BS. No rebound, guarding, or rigidity. No pulsatile masses. · Musculoskeletal: Moves all extremities x 4. Warm and well perfused, no clubbing, cyanosis, or edema. Capillary refill <3 seconds. Tenderness over the right SI joint and right paraspinous muscles. 2+ pedal and posterior tibial pulses intact. Normal reflexes and knee and ankle. Full sensation intact to light touch in distal extremities. · Integument: skin warm and dry. No rashes. · Lymphatic: no lymphadenopathy noted  · Neurologic: GCS 15, no focal deficits, symmetric strength 5/5 in the upper and lower extremities bilaterally  · Psychiatric: Normal Affect    Lab / Imaging Results   (All laboratory and radiology results have been personally reviewed by myself)  Labs:  No results found for this visit on 07/12/20. Imaging: All Radiology results interpreted by Radiologist unless otherwise noted. US DUP LOWER EXTREMITY RIGHT GABO   Final Result      No evidence for deep venous thrombosis in the right lower extremity. XR KNEE RIGHT (1-2 VIEWS)   Final Result      Status post left total knee arthroplasty without definite evidence of   complication. CT LUMBAR SPINE WO CONTRAST   Final Result   Moderate to severe central spinal stenosis due to degenerative   osteoarthropathy in the lumbar spine, most prominent at the L3-4   level. No evidence of fracture, and the only subtle displacement is a grade 1   anterolisthesis of L4 on L5. ED Course / Medical Decision Making     Medications   dexamethasone (DECADRON) injection 8 mg (8 mg Intramuscular Given 7/12/20 1229)        Re-examination:  7/12/20       Time: 1400 she returned from the bathroom was ambulating with a steady gait and discussed results with her and will discharge. Consult(s):   None    Procedure(s):   none    MDM:  Case discussed with Dr. Jose Dominguez and will obtain US of lower extremity.   Patient is afebrile, nontoxic in appearance, hemodynamically stable has no signs of acute discitis and has no signs of acute cauda equina syndrome. She has no neurologic or sensory deficits on exam.  At this time ultrasound of the right extremity is negative for DVT she is on Eliquis has not missed her doses. The right knee x-ray has no periprosthetic lucency or fracture. CT of the lumbar spine reveals moderate to severe central spinal stenosis and degenerative osteoarthropathy in the lumbar spine most prominent at L3 and 4 with a transverse disc bulging and most likely encroaching on the L4 nerve root on the right which is giving her the radicular symptoms in her lower extremities. Patient was given a shot of Decadron in the ED will be given a Medrol Dosepak for home and is unable to take NSAIDs due to her chronic anticoagulation and will be prescribed Tylenol. Patient advised to follow-up with PCP also given neurosurgeon for reevaluation. Patient advised on signs and symptoms warranting immediate return to the ED for reevaluation. Counseling: The emergency provider has spoken with the patient and discussed todays results, in addition to providing specific details for the plan of care and counseling regarding the diagnosis and prognosis. Questions are answered at this time and they are agreeable with the plan. Assessment      1. Sciatica of right side    2. Acute pain of right knee    3.  Lumbar disc disease with radiculopathy      Plan   Discharge to home  Patient condition is good    New Medications     Discharge Medication List as of 7/12/2020  2:19 PM      START taking these medications    Details   methylPREDNISolone (MEDROL, KENYA,) 4 MG tablet Take by mouth., Disp-1 kit,R-zeroPrint      acetaminophen (APAP EXTRA STRENGTH) 500 MG tablet Take 1 tablet by mouth every 6 hours as needed for Pain, Disp-20 tablet,R-0Print           Electronically signed by BENIGNO Berger CNP   DD: 7/12/20  **This report was transcribed using voice recognition software. Every effort was made to ensure accuracy; however, inadvertent computerized transcription errors may be present.   END OF ED PROVIDER NOTE     BENIGNO Villalobos CNP  07/12/20 2013

## 2020-07-13 ENCOUNTER — TELEPHONE (OUTPATIENT)
Dept: FAMILY MEDICINE CLINIC | Age: 80
End: 2020-07-13

## 2020-07-13 NOTE — TELEPHONE ENCOUNTER
Pt called in and stated that she was in hospital yesterday.  She would like to be referred to PT Gibran Mujica PH: [phone] ([address] )  Pt does not want an appointment to see a resident and would like to know if you can just place referral?    Please advise  Thanks

## 2020-07-15 ENCOUNTER — OFFICE VISIT (OUTPATIENT)
Dept: FAMILY MEDICINE CLINIC | Age: 80
End: 2020-07-15
Payer: MEDICARE

## 2020-07-15 ENCOUNTER — TELEPHONE (OUTPATIENT)
Dept: FAMILY MEDICINE CLINIC | Age: 80
End: 2020-07-15

## 2020-07-15 VITALS
OXYGEN SATURATION: 97 % | HEART RATE: 89 BPM | HEIGHT: 64 IN | WEIGHT: 183 LBS | RESPIRATION RATE: 18 BRPM | SYSTOLIC BLOOD PRESSURE: 150 MMHG | TEMPERATURE: 97.5 F | BODY MASS INDEX: 31.24 KG/M2 | DIASTOLIC BLOOD PRESSURE: 95 MMHG

## 2020-07-15 PROCEDURE — 4040F PNEUMOC VAC/ADMIN/RCVD: CPT | Performed by: FAMILY MEDICINE

## 2020-07-15 PROCEDURE — 1036F TOBACCO NON-USER: CPT | Performed by: FAMILY MEDICINE

## 2020-07-15 PROCEDURE — G8417 CALC BMI ABV UP PARAM F/U: HCPCS | Performed by: FAMILY MEDICINE

## 2020-07-15 PROCEDURE — G8399 PT W/DXA RESULTS DOCUMENT: HCPCS | Performed by: FAMILY MEDICINE

## 2020-07-15 PROCEDURE — G8427 DOCREV CUR MEDS BY ELIG CLIN: HCPCS | Performed by: FAMILY MEDICINE

## 2020-07-15 PROCEDURE — 99213 OFFICE O/P EST LOW 20 MIN: CPT | Performed by: FAMILY MEDICINE

## 2020-07-15 PROCEDURE — 1090F PRES/ABSN URINE INCON ASSESS: CPT | Performed by: FAMILY MEDICINE

## 2020-07-15 PROCEDURE — 1123F ACP DISCUSS/DSCN MKR DOCD: CPT | Performed by: FAMILY MEDICINE

## 2020-07-15 RX ORDER — LIDOCAINE 50 MG/G
1 PATCH TOPICAL DAILY
Qty: 30 PATCH | Refills: 1 | Status: SHIPPED | OUTPATIENT
Start: 2020-07-15 | End: 2020-08-14

## 2020-07-15 RX ORDER — TRAMADOL HYDROCHLORIDE 50 MG/1
50 TABLET ORAL EVERY 4 HOURS PRN
Qty: 30 TABLET | Refills: 0 | Status: SHIPPED
Start: 2020-07-15 | End: 2020-07-21 | Stop reason: SDUPTHER

## 2020-07-15 RX ORDER — TIZANIDINE 2 MG/1
2 TABLET ORAL EVERY 8 HOURS PRN
Qty: 60 TABLET | Refills: 0 | Status: SHIPPED
Start: 2020-07-15 | End: 2020-10-22

## 2020-07-15 ASSESSMENT — ENCOUNTER SYMPTOMS
ABDOMINAL PAIN: 0
CHEST TIGHTNESS: 0
BACK PAIN: 1
SHORTNESS OF BREATH: 0

## 2020-07-15 NOTE — PROGRESS NOTES
[facility]  Family Medicine Residency Program  Phone: [phone]  Fax: [phone]    Patient:  Alyce Adjutant 79 y.o. female                                 Date of Service: 7/15/20                            Chiefcomplaint:   Chief Complaint   Patient presents with    Hip Pain     rt leg     Knee Pain    Groin Pain         History of Present Illness: The patient is a 79 y.o. female  presented to the clinic with complaints as above. RLE Pains - started about 7d prior. Focus of pains located in the R knee but thigh feels like there is a tight band around it mostly around the hip area. Seen in ER 3d prior was given steroid pack to go home + Tylenol which is only minimal helpful. Difficult to find a comfortable position. Images taken of back. Only \"injury\" a week ago was that she slipped on wet rocks when watering flowers but did NOT fall. Pains started about 1-2d later. No numbness, feels weak due to pains but not true weakness. NO bowel/bladder incontinence. Nos issues with movement/coordination. Pains are improved somewhat with specific positions but overall there are few comfortable positions. Review of Systems:   Review of Systems   Respiratory: Negative for chest tightness and shortness of breath. Cardiovascular: Negative for chest pain and palpitations. Gastrointestinal: Negative for abdominal pain. Musculoskeletal: Positive for arthralgias and back pain.        Past Medical History:      Diagnosis Date    Arthritis     Bilateral carpal tunnel syndrome     right more than left    Diabetes mellitus ([address]     diet controlled & cinnamon    DM2 (diabetes mellitus, type 2) ([address] 2/16/2012    patient states not diabetic, family hx of diabetes; A1C-6.2    Endometrial cancer ([address] 1992    early small cured    GERD (gastroesophageal reflux disease)     HTN (hypertension) 2/16/2012    Hyperlipidemia     borderline- no meds    Hypertension controlled    Obesity 5/28/2013    Osteoarthritis     Osteoarthritis of right knee     [place]    Sinus drainage     seasonal (winter)    Spondylosis of lumbosacral joint     Voice absence     only with anxiety       Past Surgical History:        Procedure Laterality Date    COLONOSCOPY  [address]    1 hyperplastic polyp 2mm    HYSTERECTOMY  1990's    AZALEA BSO    JOINT REPLACEMENT Right     Knee    OTHER SURGICAL HISTORY  3/11/2015    EGD with Biopsy    AZALEA AND BSO      1990's AZALEA and BSO    TONSILLECTOMY      age 13    TOTAL KNEE ARTHROPLASTY  2/2012    right    UPPER GASTROINTESTINAL ENDOSCOPY  08       Allergies:    Penicillins; Aspirin; Losartan potassium-hctz; Omeprazole; Protonix [pantoprazole]; and Cyclobenzaprine    Social History:   Social History     Socioeconomic History    Marital status:      Spouse name: Not on file    Number of children: 0    Years of education: 6    Highest education level: Associate degree: occupational, technical, or vocational program   Occupational History    Occupation: beautician   Social Needs    Financial resource strain: Not hard at all   [place] insecurity     Worry: Never true     Inability: Never true    Transportation needs     Medical: No     Non-medical: No   Tobacco Use    Smoking status: Never Smoker    Smokeless tobacco: Never Used   Substance and Sexual Activity    Alcohol use: No    Drug use: No    Sexual activity: Not Currently   Lifestyle    Physical activity     Days per week: 0 days     Minutes per session: 0 min    Stress: Not at all   Relationships    Social connections     Talks on phone: More than three times a week     Gets together: Once a week     Attends [employer] service: More than 4 times per year     Active member of club or organization: Yes     Attends meetings of clubs or organizations: More than 4 times per year     Relationship status:      Intimate partner violence     Fear of current or ex partner: Not on file     Emotionally abused: Not on file     Physically abused: Not on file     Forced sexual activity: Not on file   Other Topics Concern    Not on file   Social History Narrative    Not on file        Family History:       Problem Relation Age of Onset    Diabetes Mother     Heart Disease Brother     Heart Disease Father        Physical Exam:    Vitals: BP (!) 150/95   Pulse 89   Temp 97.5 °F (36.4 °C)   Resp 18   Ht 5' 4\" (1.626 m)   Wt 183 lb (83 kg)   SpO2 97%   BMI 31.41 kg/m²   BP Readings from Last 3 Encounters:   07/15/20 (!) 150/95   07/12/20 (!) 173/91   05/18/20 119/76     General Appearance: Well developed, awake, alert, oriented, no acute distress  Chest wall/Lung: Clear to auscultation bilaterally,  respirations unlabored. No ronchi/wheezing/rales  Heart[de-identified]  Regular rate and rhythm, S1and S2 normal, no murmur, rub or gallop. Extremities:  Extremities normal, atraumatic, no cyanosis. +1 edema. Musculokeletal: There is TTP over about the L4-5 region on the R side of the spine, there is no spinous process ttp, there is intact sensation b/l LE with NEG SLR b/l. There is some increased tension in the lumbar paraspinals. Gait ranges from normal to antalgic. Neurologic:   Alert&Oriented. Normal gait and coordination  No focal neurological deficits appreaciated             Assessment and Plan:         1. DDD (degenerative disc disease), lumbar  Discussed the use of Lidocaine patches topically with continuation of her steroid pack. I suspect this will be insufficient for pain control so I am going to write for short term tramadol + muscle relaxer treatment. I encouraged her to be careful with sedation/confusion. I also encouraged her to begin PT as soon as possible. If she develops numbness/weakness she will call back or seek care in the ER. We will need to do PT to determine if MRI is indicated and if referral to neurosurg is warranted.     - lidocaine (LIDODERM) 5 %; Place 1 patch onto the skin daily 12 hours on, 12 hours off. Dispense: 30 patch; Refill: 1  - tiZANidine (ZANAFLEX) 2 MG tablet; Take 1 tablet by mouth every 8 hours as needed (muscle spasms)  Dispense: 60 tablet; Refill: 0  - traMADol (ULTRAM) 50 MG tablet; Take 1 tablet by mouth every 4 hours as needed for Pain for up to 5 days. Intended supply: 5 days. Take lowest dose possible to manage pain  Dispense: 30 tablet; Refill: 0    2. Lumbosacral radiculopathy at L4  As above  - lidocaine (LIDODERM) 5 %; Place 1 patch onto the skin daily 12 hours on, 12 hours off. Dispense: 30 patch; Refill: 1  - tiZANidine (ZANAFLEX) 2 MG tablet; Take 1 tablet by mouth every 8 hours as needed (muscle spasms)  Dispense: 60 tablet; Refill: 0  - traMADol (ULTRAM) 50 MG tablet; Take 1 tablet by mouth every 4 hours as needed for Pain for up to 5 days. Intended supply: 5 days. Take lowest dose possible to manage pain  Dispense: 30 tablet; Refill: 0        Return to Office: Return if symptoms worsen or fail to improve. I encourage further reading and education about your health conditions. Information on many healthconditions is provided by the American Academy of Family Physicians: https://familydoctor. org/  Please bring any questions to me at your next visit. This document may have been prepared at least partiallythrough the use of voice recognition software. Although effort is taken to assure the accuracy of this document, it is possible that grammatical, syntax,  or spelling errors may occur. Medication List:    Current Outpatient Medications   Medication Sig Dispense Refill    lidocaine (LIDODERM) 5 % Place 1 patch onto the skin daily 12 hours on, 12 hours off. 30 patch 1    tiZANidine (ZANAFLEX) 2 MG tablet Take 1 tablet by mouth every 8 hours as needed (muscle spasms) 60 tablet 0    traMADol (ULTRAM) 50 MG tablet Take 1 tablet by mouth every 4 hours as needed for Pain for up to 5 days. Intended supply: 5 days.  Take lowest dose possible to manage pain 30 tablet 0    methylPREDNISolone (MEDROL, KENYA,) 4 MG tablet Take by mouth. 1 kit zero    acetaminophen (APAP EXTRA STRENGTH) 500 MG tablet Take 1 tablet by mouth every 6 hours as needed for Pain 20 tablet 0    irbesartan (AVAPRO) 75 MG tablet Take 1 tablet by mouth daily 90 tablet 1    apixaban (ELIQUIS) 5 MG TABS tablet Take 1 tablet by mouth 2 times daily 60 tablet 5    magnesium hydroxide (MILK OF MAGNESIA) 400 MG/5ML suspension Take 30 mLs by mouth daily as needed for Constipation 1 Bottle 0    metoprolol succinate (TOPROL XL) 50 MG extended release tablet Take 1 tablet by mouth 2 times daily 180 tablet 1    Coenzyme Q10 (COQ-10) 200 MG CAPS Take 1 capsule by mouth daily      docusate sodium (COLACE) 100 MG capsule Take 100 mg by mouth daily as needed for Constipation      CRANBERRY PO Take by mouth daily      Multiple Vitamins-Minerals (EYE VITAMINS PO) Take by mouth daily      Cinnamon 500 MG TABS Take by mouth daily      Multiple Minerals-Vitamins (CALCIUM-MAGNESIUM-ZINC-D3) TABS Take by mouth daily      B Complex Vitamins (VITAMIN B COMPLEX PO) Take by mouth daily      PREVACID 30 MG delayed release capsule Take 1 capsule by mouth daily 30-60min prior to your first meal 90 capsule 1    Multiple Vitamin (MULTIVITAMIN PO) Take by mouth daily        No current facility-administered medications for this visit.          Jessica Sosa MD

## 2020-07-15 NOTE — TELEPHONE ENCOUNTER
Pt called in and stated that she has been taking the tylenol extra strength for the pain in her leg and it is not helping. Pt would like to know if you are willing to prescribe her something else.      Please advise  Thanks

## 2020-07-15 NOTE — TELEPHONE ENCOUNTER
It appears she was given steroids in the ER and tylenol. I would like to have her seen if changes are needed. I'm willing to see her today if we are able to accommodate this appt.

## 2020-07-15 NOTE — TELEPHONE ENCOUNTER
Pt to call back in next week regarding OV f/u    Need to know:  - how bad is pain  - numbness?  -weakness?  -any changes?   -where is pain located?

## 2020-07-16 ENCOUNTER — TELEPHONE (OUTPATIENT)
Dept: FAMILY MEDICINE CLINIC | Age: 80
End: 2020-07-16

## 2020-07-16 NOTE — TELEPHONE ENCOUNTER
Received fax from [employer] stating that Lidocaine patch PA was denied by insurance. They stated that the patch is not FDA approved for patients medical condition.      Please advise  Thanks

## 2020-07-16 NOTE — TELEPHONE ENCOUNTER
Script was sent to [facility], not [facility]. She should consider purchasing OTC. Common off label use includes neuropathic pain. The side effect profile is likely to be more favorable than oral opioid treatment. If you have the patient on the phone see if the tramadol helped at all.

## 2020-07-17 NOTE — TELEPHONE ENCOUNTER
Correct, medication was sent to [facility] notified us that a PA was needed  Per insurance PA has to go through [place] - which came back as denied. - called and spoke to patient, she advised that she did get lidocaine patch OTC. She also advised that the Tramadol is helping, but not for long. She states that it lasts for maybe 2-3 hours max and she is right back to the severe pain as before. Pt would like to know if she can have a different medication?

## 2020-07-21 RX ORDER — TRAMADOL HYDROCHLORIDE 50 MG/1
50 TABLET ORAL EVERY 4 HOURS PRN
Qty: 30 TABLET | Refills: 0 | Status: SHIPPED | OUTPATIENT
Start: 2020-07-21 | End: 2020-07-26

## 2020-07-21 NOTE — TELEPHONE ENCOUNTER
Called and spoke to patient - she stated that the pain is still there she has [place] overlapping pills \" she takes one and then takes a second an hour earlier than she is suppose to and that seems to help her. She stopped taking the muscle relaxer as medication was causing her to feel confused.

## 2020-07-21 NOTE — TELEPHONE ENCOUNTER
Spoke to patient, she gave verbal understanding   Pt advised that she is still currently going through PT and it seems to be helping.   Pt notified that if pain increases or continues, she needs to schedule appt with PCP to discuss

## 2020-07-21 NOTE — TELEPHONE ENCOUNTER
Please keep up with PT. Ok to stop tizanidien (muscle relaxer)  Notify me if she needs a refill. I can do one refill, however as she works with PT she will need to start spacing them out.

## 2020-07-27 ENCOUNTER — TELEPHONE (OUTPATIENT)
Dept: FAMILY MEDICINE CLINIC | Age: 80
End: 2020-07-27

## 2020-07-27 NOTE — TELEPHONE ENCOUNTER
Pt called in and stated that at last appt you had discussed her not needing appt for Dr Lydia Pastor. Pt wants to know if this is still correct so she can cancel appt.      Please advise  Thanks

## 2020-07-27 NOTE — TELEPHONE ENCOUNTER
She is responding to the treatment including pain control and Physical Therapy. Ok to cancel the neurosurg. They would likely want an MRI first anyway.

## 2020-10-21 ENCOUNTER — CARE COORDINATION (OUTPATIENT)
Dept: CARE COORDINATION | Age: 80
End: 2020-10-21

## 2020-10-21 NOTE — CARE COORDINATION
-Attempted to reach pt to offer enrollment into care coordination program, however no answer.  -Detailed VM left introducing self, reason for call, and brief explanation of program.  -Left ACM's contact information, requesting call back.

## 2020-10-21 NOTE — LETTER
10/22/2020    Hank Gilbert  [place] [address]    Dear Hank Gilbert,    I enjoyed speaking with you and wanted to send some additional information. Luisa Cast MD and I will work together to ensure your needs are met and help you achieve your health goals. I look forward to working with you. Please feel free to contact me with any questions or concerns. You can reach me at [phone].       In good health,     WILLIS Causey, RN-BC

## 2020-10-22 RX ORDER — ASCORBIC ACID 500 MG
1000 TABLET ORAL 2 TIMES DAILY
COMMUNITY

## 2020-10-22 RX ORDER — VALSARTAN 40 MG/1
40 TABLET ORAL DAILY
COMMUNITY
End: 2020-11-23 | Stop reason: SDUPTHER

## 2020-10-22 NOTE — CARE COORDINATION
-2nd attempt to call pt to offer care coordination.  -Pt answered, but stated that she was unavailable to talk right now and asked that ACM call her back shortly.  -Will attempt to call pt again later

## 2020-10-22 NOTE — CARE COORDINATION
-Called pt back and spoke to her regarding enrollment into the care coordination program.  -Pt agreeable to enroll, but did not want to complete entire CC protocol today. Pt was previously enrolled in care coordination.  -Pt enrolled for DM management/support/education and assess resource needs. Also, provide education on HTN. -Pt was unable to complete entire call. *Medication Review  -ACM was able to review medication listed and updated at this time. Pt has had previous clinical pharmacy referral before for med review and education (2/28/20). -Noted that pt is on Valsartan 40 mg daily, however, Avapro 75 mg daily is listed on med list. ACM updating pcp of discrepancy.   -Added Vitamin C 1000 mg BID and Lutein daily.   -Flagged Zanaflex and generic eye vitamin for pcp removal, as pt does not take these any longer.   -Pt asking for more refills for Metoprolol Succ and this request was sent to pcp by ACM  -Pt asking if any assistance with Eliquis cost. AC had reviewed options when working with her previously in [facility]. She states that the mail order option really did not help to save costs. She states that she is able to pay the $136/90-day supply so far at her local [facility] pharmacy, however, she just wanted to check in about other assistance programs. -ACM offered to place referral to PAP, however, pt states that she would rather call on her own and talk with someone at PAP. AC provided her with contact number and encouraged her to call to get more information on her eligibility. -ACM was only able to get small portion of CC protocol completed. Will complete on next call. *Resources  -Pt lives alone in [facility]. Pt independent at home. Pt has an in-home aide from the [place] [id number] that comes in weekly for light  assist.  -Pt receives mobile meals (7/wk) from the [facility]. Pt also received 7 food vouchers a month to eat out from the [facility].     Plan to call next week to complete entire enrollment since pt did not want to finish today. Pt declining MyChart sign up. Pt is not comfortable with computers and/or technology. Will have Welcome letter sent out, as well as DM zones, high/low BS handouts, healthy meal planning, HTN resources, BP/BS log and review on next call.

## 2020-10-22 NOTE — CARE COORDINATION
ARB - removed avapro (likely was converted due to \"shortage\")  She should use one ARB    Removed zanaflex (will remove vit at next appt)    She should contact pharm - has metoprolol written in May for 6m - likely will make it to appt

## 2020-10-23 RX ORDER — METOPROLOL SUCCINATE 50 MG/1
50 TABLET, EXTENDED RELEASE ORAL 2 TIMES DAILY
Qty: 180 TABLET | Refills: 1 | Status: SHIPPED
Start: 2020-10-23 | End: 2020-11-23 | Stop reason: SDUPTHER

## 2020-10-23 RX ORDER — METOPROLOL SUCCINATE 50 MG/1
50 TABLET, EXTENDED RELEASE ORAL 2 TIMES DAILY
Qty: 180 TABLET | Refills: 1 | Status: CANCELLED | OUTPATIENT
Start: 2020-10-23

## 2020-10-27 ENCOUNTER — CARE COORDINATION (OUTPATIENT)
Dept: CARE COORDINATION | Age: 80
End: 2020-10-27

## 2020-10-27 SDOH — ECONOMIC STABILITY: HOUSING INSECURITY: IN THE LAST 12 MONTHS, HOW MANY PLACES HAVE YOU LIVED?: 1

## 2020-10-27 SDOH — ECONOMIC STABILITY: HOUSING INSECURITY
IN THE LAST 12 MONTHS, WAS THERE A TIME WHEN YOU DID NOT HAVE A STEADY PLACE TO SLEEP OR SLEPT IN A SHELTER (INCLUDING NOW)?: NO

## 2020-10-27 SDOH — HEALTH STABILITY: MENTAL HEALTH: HOW OFTEN DO YOU HAVE A DRINK CONTAINING ALCOHOL?: NEVER

## 2020-10-27 SDOH — ECONOMIC STABILITY: INCOME INSECURITY: IN THE LAST 12 MONTHS, WAS THERE A TIME WHEN YOU WERE NOT ABLE TO PAY THE MORTGAGE OR RENT ON TIME?: NO

## 2020-10-27 NOTE — CARE COORDINATION
person she is working with may be biased on the plans she is offering. -ACM provided pt with OSHIIP information and provided her with a contact number to call and get help on insurance questions/enrollment/plans, etc. Pt appreciative. *Advance Directives  -Pt reports she has a LW and DPOA and has forms. States her brother, Kristina Reyes, is her [place] did not note forms on file and discussed with pt.  -Encouraged her to bring copies of advance directives to her upcoming appointment so that they can be scanned into her chart. *Walk-in Care/Flu Clinic   -Mailing out flyer for update clinics and will discuss on next call, as pt wanted to end call at this time. Plan  -Care coordination  -Encouraged daily FBS checks and document  -Encouraged daily BP checks and document  -Review DM zones on calls and reinforce information  -Pt to f/u with Dr. Jeramie Mcgowan on 11/2  -Pt to f/u with PCP on 11/23  -Pt to f/u with OSHIIP, PAP, and Extra Help program if wanting assist  -RD referral  -Discuss walk-in care/flu clinics on next call  -Check with pt on DM eye exam? Not sure if she is current  -Encouraged to bring advance directive copies to next appointment    Ambulatory Care Coordination Assessment    Care Coordination Protocol  Program Enrollment:  Complex Care  Referral from Primary Care Provider:  No  Week 1 - Initial Assessment     Do you have all of your prescriptions and are they filled?:  Yes  Barriers to medication adherence:  Other, Adverse effects/side effects  Other barriers to medication adherence:  cost for Eliquis  Are you able to afford your medications?:  Yes  How often do you have trouble taking your medications the way you have been told to take them?:  I always take them as prescribed. Do you have Home O2 Therapy?:  No      Ability to seek help/take action for Emergent Urgent situations i.e. fire, crime, inclement weather or health crisis. :  Independent  Ability to ambulate to restroom: Independent  Ability handle personal hygeine needs (bathing/dressing/grooming): Independent  Ability to manage Medications: Independent  Ability to prepare Food Preparation:  Independent  Ability to maintain home (clean home, laundry):  Needs Assistance  Ability to drive and/or has transportation:  Independent  Ability to do shopping:  Independent  Ability to manage finances: Independent  Is patient able to live independently?:  Yes     Current Housing:  Private Residence        Per the Fall Risk Screening, did the patient have 2 or more falls or 1 fall with injury in the past year?:  No        Are you experiencing loss of meaning?:  No  Are you experiencing loss of hope and peace?:  No     Thinking about your patient's physical health needs, are there any symptoms or problems (risk indicators) you are unsure about that require further investigation?:  No identified areas of uncertainly or problems already being investigated   Are the patients physical health problems impacting on their mental well-being?:  No identified areas of concern   Are there any problems with your patients lifestyle behaviors (alcohol, drugs, diet, exercise) that are impacting on physical or mental well-being?:  No identified areas of concern   Do you have any other concerns about your patients mental well-being?  How would you rate their severity and impact on the patient?:  No identified areas of concern   How would you rate their home environment in terms of safety and stability (including domestic violence, insecure housing, neighbor harassment)?:  Consistently safe, supportive, stable, no identified problems   How do daily activities impact on the patient's well-being? (include current or anticipated unemployment, work, caregiving, access to transportation or other):  No identified problems or perceived positive benefits   How would you rate their social network (family, work, friends)?:  Good participation with social networks tablet Take 1 tablet by mouth 2 times daily 9/24/20   Pop Atkinson MD   acetaminophen (APAP EXTRA STRENGTH) 500 MG tablet Take 1 tablet by mouth every 6 hours as needed for Pain 7/12/20   BENIGNO Gutierrez - CNP   magnesium hydroxide (MILK OF MAGNESIA) 400 MG/5ML suspension Take 30 mLs by mouth daily as needed for Constipation  Patient not taking: Reported on 10/22/2020 5/18/20   Pop Atkinson MD   Coenzyme Q10 (COQ-10) 200 MG CAPS Take 1 capsule by mouth daily    Historical Provider, MD   docusate sodium (COLACE) 100 MG capsule Take 100 mg by mouth daily as needed for Constipation    Historical Provider, MD   CRANBERRY PO Take 2 tablets by mouth daily     Historical Provider, MD   Multiple Vitamins-Minerals (EYE VITAMINS PO) Take 1 tablet by mouth daily     Historical Provider, MD   Cinnamon 500 MG TABS Take 1 tablet by mouth daily     Historical Provider, MD   Multiple Minerals-Vitamins (CALCIUM-MAGNESIUM-ZINC-D3) TABS Take 1 tablet by mouth daily     Historical Provider, MD   B Complex Vitamins (VITAMIN B COMPLEX PO) Take by mouth daily    Historical Provider, MD   PREVACID 30 MG delayed release capsule Take 1 capsule by mouth daily 30-60min prior to your first meal  Patient not taking: Reported on 10/22/2020 1/30/20   Pop Atkinson MD   Multiple Vitamin (MULTIVITAMIN PO) Take 1 tablet by mouth daily     Historical Provider, MD       Future Appointments   Date Time Provider Rosemary Song   11/2/2020  9:40 AM Gilberto Lake MD [facility]   11/23/2020  9:45 AM Pop Atkinson MD [facility]     ,   Diabetes Assessment    Medic Alert ID:  No  Meal Planning:  Avoidance of concentrated sweets   How often do you test your blood sugar?:  Daily   Do you have barriers with adherence to non-pharmacologic self-management interventions?  (Nutrition/Exercise/Self-Monitoring):  Yes   Have you ever had to go to the ED for symptoms of low blood sugar?:  No       No patient-reported symptoms Do you have hyperglycemia symptoms?:  No   Do you have hypoglycemia symptoms?:  No   Last Blood Sugar Value:  152   Blood Sugar Monitoring Regimen:  Morning Fasting   Blood Sugar Trends:  Fluctuating       and This patient was permanently screened out of Care Coordination on 10/27/2020 for the following reason:

## 2020-10-28 ENCOUNTER — CARE COORDINATION (OUTPATIENT)
Dept: CARE COORDINATION | Age: 80
End: 2020-10-28

## 2020-10-28 NOTE — CARE COORDINATION
Welcome letter   DM zones   Planning health meals   Walk-in info with Flu clinics  High & Low BS info   BS log   BP log   BP measurement instructions-     All materials mailed to patient.

## 2020-10-29 ENCOUNTER — CARE COORDINATION (OUTPATIENT)
Dept: CASE MANAGEMENT | Age: 80
End: 2020-10-29

## 2020-10-29 NOTE — CARE COORDINATION
Contacted Mary Engel 79 regarding Dietitian referral. Pt answered, RD explained reason for call and role in care. Pt states she has company at time of call. RD offered to call patient back on a different day and time- pt prefers to call this RD back in the future. RD provided contact information and will follow up as appropriate.        [address]

## 2020-10-31 PROBLEM — I48.21 PERMANENT ATRIAL FIBRILLATION (HCC): Status: ACTIVE | Noted: 2020-10-31

## 2020-10-31 PROBLEM — Z79.01 CHRONIC ANTICOAGULATION: Status: ACTIVE | Noted: 2020-10-31

## 2020-11-02 ENCOUNTER — OFFICE VISIT (OUTPATIENT)
Dept: CARDIOLOGY CLINIC | Age: 80
End: 2020-11-02
Payer: MEDICARE

## 2020-11-02 VITALS
DIASTOLIC BLOOD PRESSURE: 80 MMHG | HEIGHT: 64 IN | WEIGHT: 182 LBS | BODY MASS INDEX: 31.07 KG/M2 | RESPIRATION RATE: 14 BRPM | HEART RATE: 92 BPM | SYSTOLIC BLOOD PRESSURE: 110 MMHG

## 2020-11-02 PROCEDURE — G8484 FLU IMMUNIZE NO ADMIN: HCPCS | Performed by: INTERNAL MEDICINE

## 2020-11-02 PROCEDURE — G8417 CALC BMI ABV UP PARAM F/U: HCPCS | Performed by: INTERNAL MEDICINE

## 2020-11-02 PROCEDURE — 4040F PNEUMOC VAC/ADMIN/RCVD: CPT | Performed by: INTERNAL MEDICINE

## 2020-11-02 PROCEDURE — 1036F TOBACCO NON-USER: CPT | Performed by: INTERNAL MEDICINE

## 2020-11-02 PROCEDURE — 1123F ACP DISCUSS/DSCN MKR DOCD: CPT | Performed by: INTERNAL MEDICINE

## 2020-11-02 PROCEDURE — 99213 OFFICE O/P EST LOW 20 MIN: CPT | Performed by: INTERNAL MEDICINE

## 2020-11-02 PROCEDURE — G8399 PT W/DXA RESULTS DOCUMENT: HCPCS | Performed by: INTERNAL MEDICINE

## 2020-11-02 PROCEDURE — G8427 DOCREV CUR MEDS BY ELIG CLIN: HCPCS | Performed by: INTERNAL MEDICINE

## 2020-11-02 PROCEDURE — 1090F PRES/ABSN URINE INCON ASSESS: CPT | Performed by: INTERNAL MEDICINE

## 2020-11-02 PROCEDURE — 93000 ELECTROCARDIOGRAM COMPLETE: CPT | Performed by: INTERNAL MEDICINE

## 2020-11-02 NOTE — PROGRESS NOTES
Patient Active Problem List   Diagnosis    Osteoarthritis of right knee    Endometrial cancer ([facility]    GERD (gastroesophageal reflux disease)    Spondylosis of lumbosacral joint    Obesity    Essential hypertension    Mixed hyperlipidemia    Type 2 diabetes mellitus without complication (HCC)    Permanent atrial fibrillation (HCC)    Chronic anticoagulation       Current Outpatient Medications   Medication Sig Dispense Refill    metoprolol succinate (TOPROL XL) 50 MG extended release tablet Take 1 tablet by mouth 2 times daily 180 tablet 1    vitamin C (ASCORBIC ACID) 500 MG tablet Take 1,000 mg by mouth 2 times daily      LUTEIN PO Take 1 capsule by mouth daily      BETA CAROTENE PO Take 1 tablet by mouth daily      valsartan (DIOVAN) 40 MG tablet Take 40 mg by mouth daily      apixaban (ELIQUIS) 5 MG TABS tablet Take 1 tablet by mouth 2 times daily 60 tablet 5    acetaminophen (APAP EXTRA STRENGTH) 500 MG tablet Take 1 tablet by mouth every 6 hours as needed for Pain 20 tablet 0    Coenzyme Q10 (COQ-10) 200 MG CAPS Take 1 capsule by mouth daily      CRANBERRY PO Take 2 tablets by mouth daily       Multiple Vitamins-Minerals (EYE VITAMINS PO) Take 1 tablet by mouth daily       Cinnamon 500 MG TABS Take 1 tablet by mouth daily       Multiple Minerals-Vitamins (CALCIUM-MAGNESIUM-ZINC-D3) TABS Take 1 tablet by mouth daily       Multiple Vitamin (MULTIVITAMIN PO) Take 1 tablet by mouth daily        No current facility-administered medications for this visit. CC:    Patient is seen in follow up for:  1. Permanent atrial fibrillation ([facility]    2. Essential hypertension    3. Chronic anticoagulation        HPI:  Patient is doing well without any specific cardiac problems. Patient denies any shortness of breath, chest pain, lightheadedness or dizziness. Already medications well without side effects.     ROS:   General: No unusual weight gain, no change in exercise tolerance  Skin: No rash or itching  EENT: No vision changes or nosebleeds  Cardiovascular: No orthopnea or paroxysmal nocturnal dyspnea  Respiratory: No cough or hemoptysis  Gastrointestinal: No hematemesis or recent changes in bowel habits  Genitourinary: No hematuria, urgency or frequency  Musculoskeletal: No muscular weakness or joint swelling   Neurologic / Psychiatric: No incoordination or convulsions  Allergic / Immunologic/ Lymphatic / Endocrine: No anemia or bleeding tendency    Social History     Socioeconomic History    Marital status:      Spouse name: Not on file    Number of children: 0    Years of education: 6    Highest education level: Associate degree: occupational, technical, or vocational program   Occupational History    Occupation: Mobvoiician   Social Needs    Financial resource strain: Not hard at all   [employer] insecurity     Worry: Never true     Inability: Never true   [place] needs     Medical: No     Non-medical: No   Tobacco Use    Smoking status: Never Smoker    Smokeless tobacco: Never Used   Substance and Sexual Activity    Alcohol use: No     Frequency: Never     Binge frequency: Never    Drug use: No    Sexual activity: Not Currently   Lifestyle    Physical activity     Days per week: 0 days     Minutes per session: 0 min    Stress: Not at all   Relationships    Social connections     Talks on phone: More than three times a week     Gets together: Once a week     Attends Anabaptist service: More than 4 times per year     Active member of club or organization: Yes     Attends meetings of clubs or organizations: More than 4 times per year     Relationship status:      Intimate partner violence     Fear of current or ex partner: Not on file     Emotionally abused: Not on file     Physically abused: Not on file     Forced sexual activity: Not on file   Other Topics Concern    Not on file   Social History Narrative    Not on file       Family History   Problem Relation Age of Onset    Diabetes Mother     Heart Disease Brother     Heart Disease Father        Past Medical History:   Diagnosis Date    Arthritis     Bilateral carpal tunnel syndrome     right more than left    Diabetes mellitus ([facility])     diet controlled & cinnamon    DM2 (diabetes mellitus, type 2) ([facility]) 2/16/2012    patient states not diabetic, family hx of diabetes; A1C-6.2    Endometrial cancer ([facility]) 1992    early small cured    GERD (gastroesophageal reflux disease)     HTN (hypertension) 2/16/2012    Hyperlipidemia     borderline- no meds    Hypertension     controlled    Obesity 5/28/2013    Osteoarthritis     Osteoarthritis of right knee     Kavya Passy    Sinus drainage     seasonal (winter)    Spondylosis of lumbosacral joint     Voice absence     only with anxiety       PHYSICAL EXAM:  CONSTITUTIONAL:  Well developed, well nourished    Vitals:    11/02/20 0954   BP: 110/80   Pulse: 92   Resp: 14   Weight: 182 lb (82.6 kg)   Height: 5' 4\" (1.626 m)     HEAD & FACE: Normocephalic. Symmetric. EYES: No xanthelasma. Conjunctivae not injected. EARS, NOSE, MOUTH & THROAT: Good dentition. No oral pallor or cyanosis. NECK: No JVD at 30 degrees. No thyromegaly. RESPIRATORY: Clear to auscultation and percussion in all fields. No use of accessory muscle or intercostal retractions. CARDIOVASCULAR: Irregularly irregular variable intensity of S1 normal S2. No carotid bruits. Abdominal aorta not enlarged. Femoral arteries without bruits. Pedal pulses 2+. No edema. ABDOMEN: Soft without hepatic or splenic enlargement. No tenderness. MUSCULOSKELETAL: No kyphosis or scoliosis of the back. Good muscle strength and tone. No muscle atrophy. Normal gait and ability to undergo exercise stress testing. EXTREMITIES: No clubbing or cyanosis. SKIN: No Xanthomas or ulcerations. NEUROLOGIC: Oriented to time, place and person. Normal mood and affect.     LYMPHATIC:  No palpable neck or supraclavicular nodes. No splenomegaly. EKG: the EKG tracing was reviewed and found to reveal: Atrial fibrillation with controlled response    ASSESSMENT:                                                     ORDERS:       Diagnosis Orders   1. Permanent atrial fibrillation (HCC)  EKG 12 lead   2. Essential hypertension     3. Chronic anticoagulation       Above assessment cardiac issues stable. PLAN:   See above orders. Old records were reviewed and found to reveal: Normal CMP on 12/30/2019. Continue current medical therapy. Discussed issues that would prompt earlier evaluation. Follow-up office visit in 12 months.

## 2020-11-05 ENCOUNTER — CARE COORDINATION (OUTPATIENT)
Dept: CASE MANAGEMENT | Age: 80
End: 2020-11-05

## 2020-11-05 NOTE — CARE COORDINATION
Paty Bring  November 5, 2020    Initial Referral Reason: Diabetes     Patient Care Team:  Rosalind Woodard MD as PCP - General (Family Medicine)  Roaslind Woodard MD as PCP - [address] [place] [address]  [facility] Provider  Temitope Stewart MD as Consulting Physician (Cardiology)  Audrey Dumont RN as [facility]  Amador Wen RD, LD as Dietitian (Dietitian)    Past Medical History:    Current Outpatient Medications   Medication Sig Dispense Refill    metoprolol succinate (TOPROL XL) 50 MG extended release tablet Take 1 tablet by mouth 2 times daily 180 tablet 1    vitamin C (ASCORBIC ACID) 500 MG tablet Take 1,000 mg by mouth 2 times daily      LUTEIN PO Take 1 capsule by mouth daily      BETA CAROTENE PO Take 1 tablet by mouth daily      valsartan (DIOVAN) 40 MG tablet Take 40 mg by mouth daily      apixaban (ELIQUIS) 5 MG TABS tablet Take 1 tablet by mouth 2 times daily 60 tablet 5    acetaminophen (APAP EXTRA STRENGTH) 500 MG tablet Take 1 tablet by mouth every 6 hours as needed for Pain 20 tablet 0    Coenzyme Q10 (COQ-10) 200 MG CAPS Take 1 capsule by mouth daily      CRANBERRY PO Take 2 tablets by mouth daily       Multiple Vitamins-Minerals (EYE VITAMINS PO) Take 1 tablet by mouth daily       Cinnamon 500 MG TABS Take 1 tablet by mouth daily       Multiple Minerals-Vitamins (CALCIUM-MAGNESIUM-ZINC-D3) TABS Take 1 tablet by mouth daily       Multiple Vitamin (MULTIVITAMIN PO) Take 1 tablet by mouth daily        No current facility-administered medications for this visit.         Biochemical Data, Medical Tests and Procedures:    Lab Results   Component Value Date    LABA1C 6.1 05/18/2020     No results found for: EAG    Lab Results   Component Value Date    CHOL 155 12/30/2019    CHOL 178 06/17/2019    CHOL 207 (H) 02/19/2019     Lab Results   Component Value Date    TRIG 159 (H) 12/30/2019    TRIG 167 (H) 06/17/2019    TRIG 147 02/19/2019     Lab Results   Component Value Date    HDL 30 12/30/2019    HDL 34 06/17/2019    HDL 42 02/19/2019     Lab Results   Component Value Date    LDLCALC 93 12/30/2019    LDLCALC 111 (H) 06/17/2019    LDLCALC 136 (H) 02/19/2019     Lab Results   Component Value Date    LABVLDL 32 12/30/2019    LABVLDL 33 06/17/2019    LABVLDL 29 02/19/2019     No results found for: CHOLHDLRATIO    Lab Results   Component Value Date    WBC 7.4 03/17/2012    HGB 11.9 03/17/2012    HCT 35.2 03/17/2012    MCV 94.2 03/17/2012     03/17/2012       Lab Results   Component Value Date    CREATININE 0.8 12/30/2019    BUN 11 12/30/2019     12/30/2019    K 3.8 12/30/2019     12/30/2019    CO2 25 12/30/2019         Anthropometric Measurements:    Height: 64 inches (162.6 cm)  Weight: 182 lb (82.6 kg)  BMI: 31.24 (obesity class I)  IBW: 120 lb (54.5 kg) +-10%  %IBW: 151.6%  AdBW: 145 lb (65.9 kg)    Physical Exam Findings:  Deferred    Nutrition Interview: RD called pt, explained reason for call and role in care. Pt states she typically eats 2-3 meals/day. See food recall below. RD explained the importance of having a consistent carbohydrate intake throughout the day to help keep blood sugars steady, avoiding spikes and dips. Reviewed which foods contain carbohydrates and which foods do not contain carbohydrates. Explained carbohydrates are the main source of fuel for our bodies and the importance of choosing healthy sources such as whole grains, fruits and vegetables. Explained carbohydrates in food raise blood glucose and the importance of having balanced meals/snacks to help keep blood sugar steady. Discussed how eating a carbohydrate alone such as a banana versus a banana with peanut butter will affect blood sugar differently. Explained the components of a balanced meal using the MyPlate [id number]-3/9 plate fruits and/or vegetables, 1/4 plate protein and 1/4 plate starchy carbohydrates with 8 oz glass of low fat milk if desired.  RD used pt's breakfast example to explain which components are complete and which components are incomplete. Discussed ways to make the meal more balanced- adding a source of protein such as having eggs on the side or adding peanut butter to piece of toast. Reiterated the importance of having balanced meals consistently and not skipping meals. Pt explains if she has a big lunch then she will eat a piece of fruit such as an apple for dinner. RD explained the importance of having a source of protein with each meal and snack. Explained the components of a balanced snack include a serving of protein and a serving of carbohydrate. Provided some examples such as an apple with peanut butter, hard boiled egg with deejay, etc. Pt states she will try her best to make her meals more balanced. RD acknowledged this and discussed making small changes with time. Reviewed the importance of monitoring BS daily. Pt states she has not been \"faithful\" with checking her BS. RD reiterated the importance of monitoring FBS daily. Pt states she checked her BS on Monday and per pt . RD recommended checking BS at least 3x/week and slowly working up from there. RD offered to mail educational handouts to pt to reinforce concepts discussed during phone conversation, pt declined at this time. 24-Hour Diet Recall  Breakfast  Consumed: toast, deejay and coffee or cinnamon bread and coffee    Lunch  Consumed: sandwich or salad     Dinner  Consumed: apple; no other example provided per pt     Beverages: 2 cups of coffee, water and a pop very seldom per pt     Frequency of meals away from home: 4x/week    Blood sugar checks: Pt is supposed to check her FBS daily.  Pt states on Monday 11/2     Nutrition Diagnosis:  #1 Problem Altered nutrition-related lab values: A1C       Etiology related to uncontrolled type 2 diabetes       Signs/Symptoms as evidenced by A1C 6.1 as of 5/18/2020      Nutrition Intervention:     Estimated Needs  diabetic diet providing 1500 kcals to [facility],    [phone]

## 2020-11-11 ENCOUNTER — CARE COORDINATION (OUTPATIENT)
Dept: CARE COORDINATION | Age: 80
End: 2020-11-11

## 2020-11-11 NOTE — CARE COORDINATION
Ambulatory Care Coordination Note  11/11/2020  CM Risk Score: 1  Charlson 10 Year Mortality Risk Score: 16%     ACC: Tootie Hunt, RN    Summary Note:     *DM  -Condition remains unchanged. Pt seemed frustrated and preoccupied during conversation. Pt stated that her brother is on his way to [facility] ([facility] ED for CP. Pt worried about his condition and that he has heart issues. Pt was not engaged in conversation today and easily become agitated. -ACM asked if she would like to be called back on another day and she declined stating it was fine to continue call. -FBS have remained between 150-160s. Has not been real consistent in checking or documenting FBS trends. -FBS today was 167.  -Pt admits to eating more carbs then she should. Admits that she lacks balance in her diet and probably has more carbs then she should have.  -Pt states that she is trying to be more mindful of her diet and have it balanced  -Pt has been contacted by the  RD and did find it helpful.  -Reiterated information from RD and reinforced importance of balance.  -Will need to question pt on next call regarding updated DM eye exam?     *HTN  -Denies any issues, however, has not been faithful with checking regularly.  -Pt could not find BP log while on the phone.  -Confirmed that pt did receive information mailed to her by this ACM. -Encouraged pt to check BP daily and document on log sent to her. She states that she will do this. -[facility] also reviewed other material in information sent, including DM zones, flu clinic and walk-in care locations, and logs for both BP and BS.  -Noted pt had her routine f/u with Dr. Kelsey Mccormick [facility] on 11/2/2020 and was instructed to f/u in 1 year. *Medicare enrollment  -Pt had many questions regarding Medicare plans/advantage plans  -[facility] provided her with local contact, Kimberly Osborne, from [facility] and encouraged her to call Kimberly Osborne with questions and advice regarding Medicare enrollment and different plans.  Pt appreciative and order pharm [phone]; Jerald Sneakers member ([facility]);OSHIIP info given Xochitl Cough [phone])         Goals Addressed                 This Visit's Progress     Conditions and Symptoms   On track     I will schedule office visits, as directed by my provider. I will keep my appointment or reschedule if I have to cancel. I will notify my provider of any barriers to my plan of care. I will follow my Zone Management tool to seek urgent or emergent care. I will notify my provider of any symptoms that indicate a worsening of my condition. Barriers: lack of motivation and time constraints  Plan for overcoming my barriers: Encouraged pt to at least check daily FBS. Educate on DM zones. Educate on target BS levels and A1c level. Confidence: 9/10  Anticipated Goal Completion Date:1/27/21         Self Monitoring   No change     Self-Monitored Blood Glucose - I will check my blood sugar Fasting blood sugar  I will notify my provider of any trends of increasing or decreasing blood sugars over a 1 month period. I will notify my provider if I have any blood sugar readings less than 70 more than 2 times a month. Barriers: lack of motivation and lack of education  Plan for overcoming my barriers: Encourage pt to self-monitor FBS at least daily and document on log. Educate pt on DM zones and when to call her provider for any worsening in her condition. Confidence: 9/10  Anticipated Goal Completion Date: 1/27/21      Blood Pressure - I will take my blood pressure as directed - Daily  I will notify my provider of any trends of increasing or decreasing blood pressures over a month period of time. I will notify my provider of any changes in blood pressure associated with symptoms of dizziness, falls, passing out, headache, confusion/change in mental status. Barriers: lack of motivation and lack of education  Plan for overcoming my barriers: Encourage pt to check BPs daily and document trends on log.  Educate pt on worrisome s/sx of condition that she should notify her provider of. Confidence: 9/10  Anticipated Goal Completion Date: 1/27/21            Prior to Admission medications    Medication Sig Start Date End Date Taking?  Authorizing Provider   metoprolol succinate (TOPROL XL) 50 MG extended release tablet Take 1 tablet by mouth 2 times daily 10/23/20   Farrah Tamez MD   vitamin C (ASCORBIC ACID) 500 MG tablet Take 1,000 mg by mouth 2 times daily    Historical Provider, MD   LUTEIN PO Take 1 capsule by mouth daily    Historical Provider, MD   BETA CAROTENE PO Take 1 tablet by mouth daily    Historical Provider, MD   valsartan (DIOVAN) 40 MG tablet Take 40 mg by mouth daily    Historical Provider, MD   apixaban (ELIQUIS) 5 MG TABS tablet Take 1 tablet by mouth 2 times daily 9/24/20   Farrah Tamez MD   acetaminophen (APAP EXTRA STRENGTH) 500 MG tablet Take 1 tablet by mouth every 6 hours as needed for Pain 7/12/20   Trenda Noss, APRN - CNP   Coenzyme Q10 (COQ-10) 200 MG CAPS Take 1 capsule by mouth daily    Historical Provider, MD   CRANBERRY PO Take 2 tablets by mouth daily     Historical Provider, MD   Multiple Vitamins-Minerals (EYE VITAMINS PO) Take 1 tablet by mouth daily     Historical Provider, MD   Cinnamon 500 MG TABS Take 1 tablet by mouth daily     Historical Provider, MD   Multiple Minerals-Vitamins (CALCIUM-MAGNESIUM-ZINC-D3) TABS Take 1 tablet by mouth daily     Historical Provider, MD   Multiple Vitamin (MULTIVITAMIN PO) Take 1 tablet by mouth daily     Historical Provider, MD       Future Appointments   Date Time Provider Rosemary Song   11/23/2020  9:45 AM Farrah Tamez MD [facility] AND [facility]   11/2/2021 10:00 AM Christopher Grant MD [id number]      and   Diabetes Assessment    Medic Alert ID:  No  Meal Planning:  Avoidance of concentrated sweets   How often do you test your blood sugar?:  Daily   Do you have barriers with adherence to non-pharmacologic self-management interventions?  (Nutrition/Exercise/Self-Monitoring):  Yes   Have you ever had to go to the ED for symptoms of low blood sugar?:  No       No patient-reported symptoms   Do you have hyperglycemia symptoms?:  No   Do you have hypoglycemia symptoms?:  No   Last Blood Sugar Value:  167   Blood Sugar Monitoring Regimen:  Morning Fasting   Blood Sugar Trends:  No Change

## 2020-11-16 ENCOUNTER — HOSPITAL ENCOUNTER (OUTPATIENT)
Age: 80
Discharge: HOME | End: 2020-11-16
Payer: MEDICARE

## 2020-11-16 LAB
ALBUMIN SERPL-MCNC: 3.8 G/DL (ref 3.5–5.2)
ALP BLD-CCNC: 97 U/L (ref 35–104)
ALT SERPL-CCNC: 16 U/L (ref 0–32)
ANION GAP SERPL CALCULATED.3IONS-SCNC: 8 MMOL/L (ref 7–16)
AST SERPL-CCNC: 23 U/L (ref 0–31)
BILIRUB SERPL-MCNC: 0.6 MG/DL (ref 0–1.2)
BUN BLDV-MCNC: 9 MG/DL (ref 8–23)
CALCIUM SERPL-MCNC: 8.9 MG/DL (ref 8.6–10.2)
CHLORIDE BLD-SCNC: 107 MMOL/L (ref 98–107)
CHOLESTEROL, FASTING: 150 MG/DL (ref 0–199)
CO2: 24 MMOL/L (ref 22–29)
CREAT SERPL-MCNC: 0.7 MG/DL (ref 0.5–1)
GFR AFRICAN AMERICAN: >60
GFR NON-AFRICAN AMERICAN: >60 ML/MIN/1.73
GLUCOSE BLD-MCNC: 147 MG/DL (ref 74–99)
HBA1C MFR BLD: 6 % (ref 4–5.6)
HDLC SERPL-MCNC: 35 MG/DL
LDL CHOLESTEROL CALCULATED: 89 MG/DL (ref 0–99)
POTASSIUM SERPL-SCNC: 4.3 MMOL/L (ref 3.5–5)
SODIUM BLD-SCNC: 139 MMOL/L (ref 132–146)
TOTAL PROTEIN: 7.3 G/DL (ref 6.4–8.3)
TRIGLYCERIDE, FASTING: 128 MG/DL (ref 0–149)
VLDLC SERPL CALC-MCNC: 26 MG/DL

## 2020-11-16 PROCEDURE — 80053 COMPREHEN METABOLIC PANEL: CPT

## 2020-11-16 PROCEDURE — 80061 LIPID PANEL: CPT

## 2020-11-16 PROCEDURE — 83036 HEMOGLOBIN GLYCOSYLATED A1C: CPT

## 2020-11-16 PROCEDURE — 36415 COLL VENOUS BLD VENIPUNCTURE: CPT

## 2020-11-18 ENCOUNTER — CARE COORDINATION (OUTPATIENT)
Dept: CARE COORDINATION | Age: 80
End: 2020-11-18

## 2020-11-18 NOTE — CARE COORDINATION
-Attempted to reach pt for care coordination call to f/u on BS and BP readings, however, no answer. Phone rang straight to VM  -Left VM requesting a call back with ACM's contact information provided.   -Reminded pt of her upcoming appointment with her pcp on 11/23/2020 @ 9:45 am

## 2020-11-19 ENCOUNTER — CARE COORDINATION (OUTPATIENT)
Dept: CARE COORDINATION | Age: 80
End: 2020-11-19

## 2020-11-19 NOTE — CARE COORDINATION
Contacted Hortencia Carter and left voicemail regarding Dietitian follow up. Left call back number and will follow up as appropriate.          [address] [facility], [address] [place] [address]

## 2020-11-23 ENCOUNTER — CARE COORDINATION (OUTPATIENT)
Dept: CARE COORDINATION | Age: 80
End: 2020-11-23

## 2020-11-23 ENCOUNTER — OFFICE VISIT (OUTPATIENT)
Dept: FAMILY MEDICINE CLINIC | Age: 80
End: 2020-11-23
Payer: MEDICARE

## 2020-11-23 VITALS
OXYGEN SATURATION: 97 % | SYSTOLIC BLOOD PRESSURE: 127 MMHG | HEIGHT: 64 IN | TEMPERATURE: 96.8 F | HEART RATE: 100 BPM | DIASTOLIC BLOOD PRESSURE: 86 MMHG | WEIGHT: 180 LBS | BODY MASS INDEX: 30.73 KG/M2 | RESPIRATION RATE: 18 BRPM

## 2020-11-23 PROCEDURE — G8484 FLU IMMUNIZE NO ADMIN: HCPCS | Performed by: FAMILY MEDICINE

## 2020-11-23 PROCEDURE — 99214 OFFICE O/P EST MOD 30 MIN: CPT | Performed by: FAMILY MEDICINE

## 2020-11-23 PROCEDURE — 1123F ACP DISCUSS/DSCN MKR DOCD: CPT | Performed by: FAMILY MEDICINE

## 2020-11-23 PROCEDURE — G8427 DOCREV CUR MEDS BY ELIG CLIN: HCPCS | Performed by: FAMILY MEDICINE

## 2020-11-23 PROCEDURE — G8417 CALC BMI ABV UP PARAM F/U: HCPCS | Performed by: FAMILY MEDICINE

## 2020-11-23 PROCEDURE — 1090F PRES/ABSN URINE INCON ASSESS: CPT | Performed by: FAMILY MEDICINE

## 2020-11-23 PROCEDURE — 1036F TOBACCO NON-USER: CPT | Performed by: FAMILY MEDICINE

## 2020-11-23 PROCEDURE — G8399 PT W/DXA RESULTS DOCUMENT: HCPCS | Performed by: FAMILY MEDICINE

## 2020-11-23 PROCEDURE — 4040F PNEUMOC VAC/ADMIN/RCVD: CPT | Performed by: FAMILY MEDICINE

## 2020-11-23 RX ORDER — VALSARTAN 40 MG/1
40 TABLET ORAL DAILY
Qty: 90 TABLET | Refills: 1 | Status: SHIPPED
Start: 2020-11-23 | End: 2021-07-26

## 2020-11-23 RX ORDER — METOPROLOL SUCCINATE 50 MG/1
50 TABLET, EXTENDED RELEASE ORAL 2 TIMES DAILY
Qty: 180 TABLET | Refills: 1 | Status: SHIPPED
Start: 2020-11-23 | End: 2021-07-20

## 2020-11-23 ASSESSMENT — ENCOUNTER SYMPTOMS
ABDOMINAL PAIN: 0
CHEST TIGHTNESS: 0
SHORTNESS OF BREATH: 0

## 2020-11-23 NOTE — CARE COORDINATION
Ambulatory Care Coordination Note  11/23/2020  CM Risk Score: 1  Charlson 10 Year Mortality Risk Score: 49%     ACC: Calin Park RN    Summary Note:    *DM  -Condition remains stable  -Pt checks QD BS intermittently. Typically 2-3 times a week. -Pt's most recent A1c was 6 on 11/16/20. Pt understands that this is at goal  -Pt reports that BS trends have been ranging anywhere between 120-150. Denies any BS <70. No BS >170 per pt. -Pt denies any s/sx of hypo/hyperglycemia. Pt did mention that recently she was having issues with sweating. Pt believes it was from taking too many lemon cough drops. Pt was taking multiple cough drops during the day to keep her mouth moist. Once she stopped taking them, sweats resolved. She discussed with pcp today at her OV. -Reviewed DM zones. Pt aware of information and was able to teach back appropriate course of action if worrisome s/sx occur.  -Pt has been following with CC MARY KAY. Let pt know that RD had tried to contact her last week and appears that she may try to f/u with her this Friday. Pt voiced understanding. *OSHIIP  -Pt has contact information for local OSHIIP rep for any questions regarding Medicare enrollment and to discuss Medicare plans. *Graduation  -Pt states that she does not feel that she needs any further f/u from [facility]. Pt states that she is doing \"fine\" and has the tools needed.  -Will update pcp of plan to graduate  -Pt has [facility]'s contact information in case she needs in the future.     Plan  -Graduate from [address] [place] [address]  -Update pcp of plan          Care Coordination Interventions    Program Enrollment:  Complex Care  Referral from Primary Care Provider:  No  Suggested Interventions and Community Resources  Diabetes Education:  Declined (Comment: Declined 10/27/2020)  Meals on Wheels:  Completed (Comment: Mobile meals through [facility] 7meals/wk; 7 vouches a month from [employer] to eat out)  Pharmacist:  Completed (Comment: Completed on 2/28/20)  Registered Dietician: Completed (Comment: Referral 10/27/2020)  Senior Services:  Completed (Comment: [employer] [facility] Qwk-light )  Zone Management Tools:  Completed (Comment: Will mail DM zones-10/22/20)  Other Services or Interventions:  [facility] mail order Bufford Krabbe [phone]; Kendal Primes member ([place]);OSHIIP info given Di Alejandre [phone])         Goals Addressed                 This Visit's Progress     COMPLETED: Conditions and Symptoms   On track     I will schedule office visits, as directed by my provider. I will keep my appointment or reschedule if I have to cancel. I will notify my provider of any barriers to my plan of care. I will follow my Zone Management tool to seek urgent or emergent care. I will notify my provider of any symptoms that indicate a worsening of my condition. Barriers: lack of motivation and time constraints  Plan for overcoming my barriers: Encouraged pt to at least check daily FBS. Educate on DM zones. Educate on target BS levels and A1c level. Confidence: 9/10  Anticipated Goal Completion Date:1/27/21         COMPLETED: Self Monitoring        Self-Monitored Blood Glucose - I will check my blood sugar Fasting blood sugar  I will notify my provider of any trends of increasing or decreasing blood sugars over a 1 month period. I will notify my provider if I have any blood sugar readings less than 70 more than 2 times a month. Barriers: lack of motivation and lack of education  Plan for overcoming my barriers: Encourage pt to self-monitor FBS at least daily and document on log. Educate pt on DM zones and when to call her provider for any worsening in her condition. Confidence: 9/10  Anticipated Goal Completion Date: 1/27/21      Blood Pressure - I will take my blood pressure as directed - Daily  I will notify my provider of any trends of increasing or decreasing blood pressures over a month period of time.   I will notify my provider of any changes in blood pressure associated with symptoms of dizziness, falls, passing out, headache, confusion/change in mental status. Barriers: lack of motivation and lack of education  Plan for overcoming my barriers: Encourage pt to check BPs daily and document trends on log. Educate pt on worrisome s/sx of condition that she should notify her provider of. Confidence: 9/10  Anticipated Goal Completion Date: 1/27/21            Prior to Admission medications    Medication Sig Start Date End Date Taking?  Authorizing Provider   valsartan (DIOVAN) 40 MG tablet Take 1 tablet by mouth daily 11/23/20   Felix Ahuja MD   metoprolol succinate (TOPROL XL) 50 MG extended release tablet Take 1 tablet by mouth 2 times daily 11/23/20   Felix Ahuja MD   apixaban [facility]) 5 MG TABS tablet Take 1 tablet by mouth 2 times daily 11/23/20   Felix Ahuja MD   vitamin C (ASCORBIC ACID) 500 MG tablet Take 1,000 mg by mouth 2 times daily    Historical Provider, MD   LUTEIN PO Take 1 capsule by mouth daily    Historical Provider, MD   BETA CAROTENE PO Take 1 tablet by mouth daily    Historical Provider, MD   acetaminophen (APAP EXTRA STRENGTH) 500 MG tablet Take 1 tablet by mouth every 6 hours as needed for Pain 7/12/20   Ashli Blackwell APRN - CNP   Coenzyme Q10 (COQ-10) 200 MG CAPS Take 1 capsule by mouth daily    Historical Provider, MD   CRANBERRY PO Take 2 tablets by mouth daily     Historical Provider, MD   Multiple Vitamins-Minerals (EYE VITAMINS PO) Take 1 tablet by mouth daily     Historical Provider, MD   Cinnamon 500 MG TABS Take 1 tablet by mouth daily     Historical Provider, MD   Multiple Minerals-Vitamins (CALCIUM-MAGNESIUM-ZINC-D3) TABS Take 1 tablet by mouth daily     Historical Provider, MD   Multiple Vitamin (MULTIVITAMIN PO) Take 1 tablet by mouth daily     Historical Provider, MD       Future Appointments   Date Time Provider Rosemary Song   5/17/2021  9:30 AM [facility]   11/2/2021 10:00

## 2020-11-23 NOTE — PROGRESS NOTES
[facility]  Family Medicine Residency Program  Phone: [phone]  Fax: [phone]    Patient:  Wing Knight 79 y.o. female                                 Date of Service: 11/23/20                            Chiefcomplaint:   Chief Complaint   Patient presents with    Diabetes    Sweats         History of Present Illness: The patient is a 79 y.o. female  presented to the clinic with complaints as above. DM2 - A1C controlled at 6. [address] fluctuates. As high as 170. No lows to report, no sx to report. Stable overall. Afib - recent appt with cardiology - maintained on meds with visit in a year. HTN - controlled, no CV sx. Tolerating meds    GERD - controlled - not needing PPI. No burning/discomfort in the stomach. Doing much better off NSAID. Review of Systems:   Review of Systems   Respiratory: Negative for chest tightness and shortness of breath. Cardiovascular: Negative for chest pain and palpitations. Gastrointestinal: Negative for abdominal pain.        Past Medical History:      Diagnosis Date    Arthritis     Bilateral carpal tunnel syndrome     right more than left    Diabetes mellitus (HCC)     diet controlled & cinnamon    DM2 (diabetes mellitus, type 2) ([facility] 2/16/2012    patient states not diabetic, family hx of diabetes; A1C-6.2    Endometrial cancer ([facility] 1992    early small cured    GERD (gastroesophageal reflux disease)     HTN (hypertension) 2/16/2012    Hyperlipidemia     borderline- no meds    Hypertension     controlled    Obesity 5/28/2013    Osteoarthritis     Osteoarthritis of right knee     [place]    Sinus drainage     seasonal (winter)    Spondylosis of lumbosacral joint     Voice absence     only with anxiety       Past Surgical History:        Procedure Laterality Date    COLONOSCOPY  08 Evan    1 hyperplastic polyp 2mm    HYSTERECTOMY  1990's    AZALEA BSO    JOINT REPLACEMENT Right     Knee    OTHER SURGICAL HISTORY  3/11/2015    EGD with Biopsy    AZALEA AND BSO      1990's AZALEA and BSO    TONSILLECTOMY      age 13    TOTAL KNEE ARTHROPLASTY  2/2012    right    UPPER GASTROINTESTINAL ENDOSCOPY  08       Allergies:    Penicillins; Aspirin; Losartan potassium-hctz; Omeprazole; Protonix [pantoprazole]; and Cyclobenzaprine    Social History:   Social History     Socioeconomic History    Marital status:      Spouse name: Not on file    Number of children: 0    Years of education: 6    Highest education level: Associate degree: occupational, technical, or vocational program   Occupational History    Occupation: Binfireician   Social Needs    Financial resource strain: Not hard at all   Resilinc insecurity     Worry: Never true     Inability: Never true   [employer] needs     Medical: No     Non-medical: No   Tobacco Use    Smoking status: Never Smoker    Smokeless tobacco: Never Used   Substance and Sexual Activity    Alcohol use: No     Frequency: Never     Binge frequency: Never    Drug use: No    Sexual activity: Not Currently   Lifestyle    Physical activity     Days per week: 0 days     Minutes per session: 0 min    Stress: Not at all   Relationships    Social connections     Talks on phone: More than three times a week     Gets together: Once a week     Attends Yazidism service: More than 4 times per year     Active member of club or organization: Yes     Attends meetings of clubs or organizations: More than 4 times per year     Relationship status:      Intimate partner violence     Fear of current or ex partner: Not on file     Emotionally abused: Not on file     Physically abused: Not on file     Forced sexual activity: Not on file   Other Topics Concern    Not on file   Social History Narrative    Not on file        Family History:       Problem Relation Age of Onset    Diabetes Mother     Heart Disease Brother     Heart Disease Father        Physical Exam:    Vitals: /86 Pulse 100   Temp 96.8 °F (36 °C)   Resp 18   Ht 5' 4\" (1.626 m)   Wt 180 lb (81.6 kg)   SpO2 97%   BMI 30.90 kg/m²   BP Readings from Last 3 Encounters:   11/23/20 127/86   11/02/20 110/80   07/15/20 (!) 150/95     General Appearance: Well developed, awake, alert, oriented, no acute distress  Chest wall/Lung: Clear to auscultation bilaterally,  respirations unlabored. No ronchi/wheezing/rales  Heart[de-identified]  Irregular rate and rhythm, S1and S2 normal, no murmur, rub or gallop. Abdomen: Soft, non-tender, bowel sounds normoactive, no masses, no organomegaly  Extremities:  Extremities normal, atraumatic, no cyanosis. +1 edema. Psychiatric: has a normal mood and affect. Behavior is normal.       Assessment and Plan:         1. Type 2 diabetes mellitus without complication, without long-term current use of insulin ([facility]  Diet controlled    2. Endometrial cancer ([facility]  History of - has not had sx. 3. Essential hypertension  controlled  - valsartan (DIOVAN) 40 MG tablet; Take 1 tablet by mouth daily  Dispense: 90 tablet; Refill: 1    4. Atrial fibrillation, unspecified type (HCC)  Stable - controlled - cont meds - observe HR.   - metoprolol succinate (TOPROL XL) 50 MG extended release tablet; Take 1 tablet by mouth 2 times daily  Dispense: 180 tablet; Refill: 1  - apixaban (ELIQUIS) 5 MG TABS tablet; Take 1 tablet by mouth 2 times daily  Dispense: 60 tablet; Refill: 5        Return to Office: Return in about 6 months (around 5/23/2021) for AWV, Diabetes, High Blood Pressure. I encourage further reading and education about your health conditions. Information on many healthconditions is provided by the American Academy of Family Physicians: https://familydoctor. org/  Please bring any questions to me at your next visit. This document may have been prepared at least partiallythrough the use of voice recognition software.  Although effort is taken to assure the accuracy of this document, it is possible

## 2020-11-27 ENCOUNTER — CARE COORDINATION (OUTPATIENT)
Dept: CARE COORDINATION | Age: 80
End: 2020-11-27

## 2020-11-27 NOTE — CARE COORDINATION
Contacted Berkley Knight and left voicemail regarding Dietitian follow up. Left call back number. RD spoke with patient for initial nutrition assessment on 11/5. RD outreached 11/19 and today 11/27 for follow up- left VM both outreaches. RD will continue to follow/assist with patient return call.        [address] [facility], [address] [place] [address]

## 2021-02-12 ENCOUNTER — TELEPHONE (OUTPATIENT)
Dept: FAMILY MEDICINE CLINIC | Age: 81
End: 2021-02-12

## 2021-02-12 NOTE — TELEPHONE ENCOUNTER
Pt called in and stated that her handicap placard expires in April and would like to get a new one. Patient would like this mailed to her if agreeable.     Please advise  Thanks

## 2021-02-17 NOTE — CARE COORDINATION
-Attempted to call pt for care coordination call, however, pt answered and states that she is busy at the moment and requests to be called back after 12p today.  -Will attempt call later. Counseled on quitting smoking  Working on quitting

## 2021-04-29 ENCOUNTER — CARE COORDINATION (OUTPATIENT)
Dept: CARE COORDINATION | Age: 81
End: 2021-04-29

## 2021-04-29 NOTE — TELEPHONE ENCOUNTER
Patient stated that she just had A1C yesterday through insurance. She will bring paper in with her in May.  She stated that it was in the 5 range

## 2021-04-29 NOTE — CARE COORDINATION
Called and spoke with pt to discuss the care coordination program and offer enrollment. Introduced self and explained role and program in detail. Pt declining enrollment at this time. Provided pt with [facility]'s contact information and encouraged pt to call if future needs do arise    Pt did mention that she feels that she is paying too much for her Eliquis ($45/mon). [facility] offered pt PAP information and she declined. ACM inquired if she has tried to apply for the Extra Help program through Medicare and she states that she is afraid to do anything like that because she is afraid that it is a scam. ACM let her know that the Extra Help program was a legit program through the [address] [facility] and Medicare and that if she would like their contact information, [facility] can provide it to her. Pt was receptive to this. Provided pt with the Extra Help program phone number. Pt states, Shiela Exon they going to ask me how much I make or how much money I have? \" ACM let her know that unfortunately they will need to know this information in order to discern whether or not she would be eligible for the program. Discussed with pt the individual income limit to be eligible and pt believes that she may be eligible based on that information. She will keep the information as a reference. Pt also asked during the call if her pcp will place order for routine labs so that she can have them checked prior to her OV with pcp on 5/17/21. ACM will route message to pcp to let him know that pt is requesting lab work prior to Susa Holiday.

## 2021-05-17 ENCOUNTER — OFFICE VISIT (OUTPATIENT)
Dept: FAMILY MEDICINE CLINIC | Age: 81
End: 2021-05-17
Payer: MEDICARE

## 2021-05-17 VITALS
BODY MASS INDEX: 32.44 KG/M2 | HEIGHT: 64 IN | TEMPERATURE: 97.7 F | WEIGHT: 190 LBS | DIASTOLIC BLOOD PRESSURE: 61 MMHG | RESPIRATION RATE: 18 BRPM | OXYGEN SATURATION: 96 % | SYSTOLIC BLOOD PRESSURE: 116 MMHG | HEART RATE: 80 BPM

## 2021-05-17 DIAGNOSIS — Z00.00 ROUTINE GENERAL MEDICAL EXAMINATION AT A HEALTH CARE FACILITY: Primary | ICD-10-CM

## 2021-05-17 PROCEDURE — 4040F PNEUMOC VAC/ADMIN/RCVD: CPT | Performed by: FAMILY MEDICINE

## 2021-05-17 PROCEDURE — G0439 PPPS, SUBSEQ VISIT: HCPCS | Performed by: FAMILY MEDICINE

## 2021-05-17 PROCEDURE — 1123F ACP DISCUSS/DSCN MKR DOCD: CPT | Performed by: FAMILY MEDICINE

## 2021-05-17 ASSESSMENT — PATIENT HEALTH QUESTIONNAIRE - PHQ9
10. IF YOU CHECKED OFF ANY PROBLEMS, HOW DIFFICULT HAVE THESE PROBLEMS MADE IT FOR YOU TO DO YOUR WORK, TAKE CARE OF THINGS AT HOME, OR GET ALONG WITH OTHER PEOPLE: 0
5. POOR APPETITE OR OVEREATING: 3
3. TROUBLE FALLING OR STAYING ASLEEP: 0
4. FEELING TIRED OR HAVING LITTLE ENERGY: 1
SUM OF ALL RESPONSES TO PHQ QUESTIONS 1-9: 8
7. TROUBLE CONCENTRATING ON THINGS, SUCH AS READING THE NEWSPAPER OR WATCHING TELEVISION: 0
6. FEELING BAD ABOUT YOURSELF - OR THAT YOU ARE A FAILURE OR HAVE LET YOURSELF OR YOUR FAMILY DOWN: 0
SUM OF ALL RESPONSES TO PHQ QUESTIONS 1-9: 8

## 2021-05-17 ASSESSMENT — LIFESTYLE VARIABLES
HOW OFTEN DURING THE LAST YEAR HAVE YOU HAD A FEELING OF GUILT OR REMORSE AFTER DRINKING: 0
HOW OFTEN DURING THE LAST YEAR HAVE YOU FOUND THAT YOU WERE NOT ABLE TO STOP DRINKING ONCE YOU HAD STARTED: 0
HAS A RELATIVE, FRIEND, DOCTOR, OR ANOTHER HEALTH PROFESSIONAL EXPRESSED CONCERN ABOUT YOUR DRINKING OR SUGGESTED YOU CUT DOWN: 0
AUDIT TOTAL SCORE: 1
HOW OFTEN DURING THE LAST YEAR HAVE YOU BEEN UNABLE TO REMEMBER WHAT HAPPENED THE NIGHT BEFORE BECAUSE YOU HAD BEEN DRINKING: 0
HOW OFTEN DURING THE LAST YEAR HAVE YOU FAILED TO DO WHAT WAS NORMALLY EXPECTED FROM YOU BECAUSE OF DRINKING: 0
HOW OFTEN DURING THE LAST YEAR HAVE YOU NEEDED AN ALCOHOLIC DRINK FIRST THING IN THE MORNING TO GET YOURSELF GOING AFTER A NIGHT OF HEAVY DRINKING: 0
HOW OFTEN DO YOU HAVE SIX OR MORE DRINKS ON ONE OCCASION: 0

## 2021-05-17 NOTE — PROGRESS NOTES
Medicare Annual Wellness Visit  Name: Jeri Clark Date: 2021   MRN: [id number] Sex: Female   Age: [de-identified] y.o. Ethnicity: Non-/Non    : 1940 Race: White      Janny Tamez is here for Medicare AWV    Screenings for behavioral, psychosocial and functional/safety risks, and cognitive dysfunction are all negative except as indicated below. These results, as well as other patient data from the [address] [facility], are documented in Flowsheets linked to this Encounter. Allergies   Allergen Reactions    Penicillins Anaphylaxis    Aspirin Other (See Comments)     Severe nose bleed    Losartan Potassium-Hctz     Omeprazole     Protonix [Pantoprazole]      Neck muscle spasm    Cyclobenzaprine      Description sounds like muscle spasm worsening rather than SE to med       Prior to Visit Medications    Medication Sig Taking?  Authorizing Provider   Handicap Placard MISC by Does not apply route Yes Delvis Styles MD   valsartan (DIOVAN) 40 MG tablet Take 1 tablet by mouth daily Yes Delvis Styles MD   metoprolol succinate (TOPROL XL) 50 MG extended release tablet Take 1 tablet by mouth 2 times daily Yes Delvis Styles MD   apixaban (ELIQUIS) 5 MG TABS tablet Take 1 tablet by mouth 2 times daily Yes Delvis Styles MD   vitamin C (ASCORBIC ACID) 500 MG tablet Take 1,000 mg by mouth 2 times daily Yes Historical Provider, MD   LUTEIN PO Take 1 capsule by mouth daily Yes Historical Provider, MD   BETA CAROTENE PO Take 1 tablet by mouth daily Yes Historical Provider, MD   acetaminophen (APAP EXTRA STRENGTH) 500 MG tablet Take 1 tablet by mouth every 6 hours as needed for Pain Yes BENIGNO Jose - CNP   Coenzyme Q10 (COQ-10) 200 MG CAPS Take 1 capsule by mouth daily Yes Historical Provider, MD   CRANBERRY PO Take 2 tablets by mouth daily  Yes Historical Provider, MD   Multiple Vitamins-Minerals (EYE VITAMINS PO) Take 1 tablet by mouth daily  Yes Historical Provider, MD   Cinnamon 500 MG TABS Take 1 tablet by mouth daily  Yes Historical Provider, MD   Multiple Minerals-Vitamins (CALCIUM-MAGNESIUM-ZINC-D3) TABS Take 1 tablet by mouth daily  Yes Historical Provider, MD   Multiple Vitamin (MULTIVITAMIN PO) Take 1 tablet by mouth daily  Yes Historical Provider, MD       Past Medical History:   Diagnosis Date    Arthritis     Bilateral carpal tunnel syndrome     right more than left    Diabetes mellitus ([facility] [address]     diet controlled & cinnamon    DM2 (diabetes mellitus, type 2) ([facility] [address] 2/16/2012    patient states not diabetic, family hx of diabetes; A1C-6.2    Endometrial cancer ([facility] [address] 1992    early small cured    GERD (gastroesophageal reflux disease)     HTN (hypertension) 2/16/2012    Hyperlipidemia     borderline- no meds    Hypertension     controlled    Obesity 5/28/2013    Osteoarthritis     Osteoarthritis of right knee     [place]    Sinus drainage     seasonal (winter)    Spondylosis of lumbosacral joint     Voice absence     only with anxiety       Past Surgical History:   Procedure Laterality Date    COLONOSCOPY  [address] [place]    1 hyperplastic polyp 2mm    HYSTERECTOMY  1990's    AZALEA BSO    JOINT REPLACEMENT Right     Knee    OTHER SURGICAL HISTORY  3/11/2015    EGD with Biopsy    AZALEA AND BSO      1990's AZALEA and BSO    TONSILLECTOMY      age 13   [facility] TOTAL KNEE ARTHROPLASTY  2/2012    right    UPPER GASTROINTESTINAL ENDOSCOPY  08       Family History   Problem Relation Age of Onset    Diabetes Mother     Heart Disease Brother     Heart Disease Father        CareTeam (Including outside providers/suppliers regularly involved in providing care):   Patient Care Team:  Nick Rangel MD as PCP - General (Family Medicine)  Nick Rangel MD as PCP - [facility] Empaneled Provider  Luis Cordova MD as Consulting Physician (Cardiology)    Wt Readings from Last 3 Encounters:   05/17/21 190 lb (86.2 kg)   11/23/20 180 lb (81.6 kg)   11/02/20 182 lb week, 30-45 minutes per session, increase activity as tolerates. She reports reasonable activity around the house. Health Habits/Nutrition:  Health Habits/Nutrition  Do you exercise for at least 20 minutes 2-3 times per week?: (!) No  Have you lost any weight without trying in the past 3 months?: No  Do you eat only one meal per day?: No  Have you seen the dentist within the past year?: Appointment is scheduled  Body mass index: (!) 32.61  Health Habits/Nutrition Interventions:  · Inadequate physical activity:  increase activity as tolerates - target 150min weekly. Hearing/Vision:  No exam data present  Hearing/Vision  Do you or your family notice any trouble with your hearing that hasn't been managed with hearing aids?: (!) Yes  Do you have difficulty driving, watching TV, or doing any of your daily activities because of your eyesight?: No  Have you had an eye exam within the past year?: (!) No  Hearing/Vision Interventions:  · Hearing concerns:  wears hearing aids. No concerns  · Vision concerns:  patient encouraged to make appointment with his/her eye specialist     ADL:  ADLs  In the past 7 days, did you need help from others to perform any of the following everyday activities? Eating, dressing, grooming, bathing, toileting, or walking/balance?: None  In the past 7 days, did you need help from others to take care of any of the following?  Laundry, housekeeping, banking/finances, shopping, telephone use, food preparation, transportation, or taking medications?: (!) Housekeeping  ADL Interventions:  · has assistance     Personalized Preventive Plan   Current Health Maintenance Status  Immunization History   Administered Date(s) Administered    Influenza 10/08/2013    Influenza Vaccine, unspecified formulation 10/01/2016    Influenza Virus Vaccine 10/01/2014    Influenza, High Dose (Fluzone 65 yrs and older) 10/12/2015, 10/01/2018    Influenza, Triv, inactivated, subunit, adjuvanted, IM (Fluad 65 yrs and older) 09/30/2019    Pneumococcal Conjugate 13-valent (Chzqzzi75) 07/29/2015    Pneumococcal Polysaccharide (Tafflvmeq81) 10/08/2013    Tdap (Boostrix, Adacel) 11/29/2016    Zoster Live (Zostavax) 01/01/2013    Zoster Recombinant (Shingrix) 02/19/2020        Health Maintenance   Topic Date Due    COVID-19 Vaccine (1) Never done   [employer] Visit (AWV)  Never done    Potassium monitoring  11/16/2021    Creatinine monitoring  11/16/2021    DTaP/Tdap/Td vaccine (2 - Td) 11/29/2026    DEXA (modify frequency per FRAX score)  Completed    Flu vaccine  Completed    Shingles Vaccine  Completed    Pneumococcal 65+ years Vaccine  Completed    Hepatitis A vaccine  Aged Out    Hib vaccine  Aged Out    Meningococcal (ACWY) vaccine  Aged Out     Recommendations for [employer] Due: see orders and patient instructions/AVS.  . Recommended screening schedule for the next 5-10 years is provided to the patient in written form: see Patient Margo Li was seen today for medicare awv.     Diagnoses and all orders for this visit:    Routine general medical examination at a health care facility

## 2021-05-17 NOTE — PATIENT INSTRUCTIONS
Personalized Preventive Plan for Tanika Maria - 5/17/2021  Medicare offers a range of preventive health benefits. Some of the tests and screenings are paid in full while other may be subject to a deductible, co-insurance, and/or copay. Some of these benefits include a comprehensive review of your medical history including lifestyle, illnesses that may run in your family, and various assessments and screenings as appropriate. After reviewing your medical record and screening and assessments performed today your provider may have ordered immunizations, labs, imaging, and/or referrals for you. A list of these orders (if applicable) as well as your Preventive Care list are included within your After Visit Summary for your review. Other Preventive Recommendations:    · A preventive eye exam performed by an eye specialist is recommended every 1-2 years to screen for glaucoma; cataracts, macular degeneration, and other eye disorders. · A preventive dental visit is recommended every 6 months. · Try to get at least 150 minutes of exercise per week or 10,000 steps per day on a pedometer . · Order or download the FREE \"Exercise & Physical Activity: Your Everyday Guide\" from [employer]. Call 4-393.853.9385 or search [employer] online. · You need 3290-6233 mg of calcium and 2982-8047 IU of vitamin D per day. It is possible to meet your calcium requirement with diet alone, but a vitamin D supplement is usually necessary to meet this goal.  · When exposed to the sun, use a sunscreen that protects against both UVA and UVB radiation with an SPF of 30 or greater. Reapply every 2 to 3 hours or after sweating, drying off with a towel, or swimming. · Always wear a seat belt when traveling in a car. Always wear a helmet when riding a bicycle or motorcycle.

## 2021-07-20 DIAGNOSIS — I48.91 ATRIAL FIBRILLATION, UNSPECIFIED TYPE (HCC): ICD-10-CM

## 2021-07-20 RX ORDER — METOPROLOL SUCCINATE 50 MG/1
TABLET, EXTENDED RELEASE ORAL
Qty: 180 TABLET | Refills: 1 | Status: SHIPPED
Start: 2021-07-20 | End: 2021-12-06

## 2021-07-24 DIAGNOSIS — I10 ESSENTIAL HYPERTENSION: ICD-10-CM

## 2021-07-26 NOTE — TELEPHONE ENCOUNTER
Requested Prescriptions     Pending Prescriptions Disp Refills    valsartan (DIOVAN) 40 MG tablet [Pharmacy Med Name: VALSARTAN 40 MG TABLET] 90 tablet 1     Sig: TAKE 1 TABLET BY MOUTH EVERY DAY

## 2021-07-27 RX ORDER — VALSARTAN 40 MG/1
TABLET ORAL
Qty: 90 TABLET | Refills: 1 | Status: SHIPPED
Start: 2021-07-27 | End: 2021-11-18 | Stop reason: SDUPTHER

## 2021-09-22 DIAGNOSIS — E11.9 TYPE 2 DIABETES MELLITUS WITHOUT COMPLICATION, WITHOUT LONG-TERM CURRENT USE OF INSULIN (HCC): ICD-10-CM

## 2021-09-23 DIAGNOSIS — I48.91 ATRIAL FIBRILLATION, UNSPECIFIED TYPE (HCC): ICD-10-CM

## 2021-09-24 RX ORDER — BLOOD PRESSURE TEST KIT
1 KIT MISCELLANEOUS 4 TIMES DAILY
Qty: 100 EACH | Refills: 5 | Status: SHIPPED | OUTPATIENT
Start: 2021-09-24

## 2021-09-24 RX ORDER — GLUCOSAMINE HCL/CHONDROITIN SU 500-400 MG
CAPSULE ORAL
Qty: 100 STRIP | Refills: 5 | Status: SHIPPED | OUTPATIENT
Start: 2021-09-24

## 2021-09-24 RX ORDER — LANCETS 28 GAUGE
1 EACH MISCELLANEOUS 2 TIMES DAILY
Qty: 100 EACH | Refills: 5 | Status: SHIPPED | OUTPATIENT
Start: 2021-09-24

## 2021-09-24 NOTE — TELEPHONE ENCOUNTER
Last Appointment   5/17/2021  Next Appointment  11/15/2021    Glucometer was sent in - needs test strips and lancets

## 2021-09-27 ENCOUNTER — TELEPHONE (OUTPATIENT)
Dept: FAMILY MEDICINE CLINIC | Age: 81
End: 2021-09-27

## 2021-09-27 NOTE — TELEPHONE ENCOUNTER
Called and LM for pharmacy advising that the script says substitute as needed and to fill what ever is covered by pts insurance.

## 2021-10-18 ENCOUNTER — TELEPHONE (OUTPATIENT)
Dept: FAMILY MEDICINE CLINIC | Age: 81
End: 2021-10-18

## 2021-10-18 DIAGNOSIS — Z12.31 ENCOUNTER FOR SCREENING MAMMOGRAM FOR BREAST CANCER: Primary | ICD-10-CM

## 2021-10-18 NOTE — TELEPHONE ENCOUNTER
----- Message from Awa Dickens sent at 10/18/2021 12:03 PM EDT -----  Subject: Referral Request    QUESTIONS   Reason for referral request? pt would like a script to have a mammogram   done   Has the physician seen you for this condition before? No   Preferred Specialist (if applicable)? Do you already have an appointment scheduled? No  Additional Information for Provider? pt needs script to have a mammogram  ---------------------------------------------------------------------------  --------------  CALL BACK INFO  What is the best way for the office to contact you? OK to leave message on   voicemail  Preferred Call Back Phone Number?  [phone]

## 2021-10-26 ENCOUNTER — TELEPHONE (OUTPATIENT)
Dept: FAMILY MEDICINE CLINIC | Age: 81
End: 2021-10-26

## 2021-10-26 NOTE — TELEPHONE ENCOUNTER
Advice/information given to patient.  Will monitor and let us know if she feels she needs an appointment

## 2021-10-26 NOTE — TELEPHONE ENCOUNTER
FYI - Patient called in stating that she got bit by a \"black spider\". She said she's been seeing them in her house and there was one in her sink today that she thinks bit her. I did advise patient to go to walk in today. Patient agrees.

## 2021-10-26 NOTE — TELEPHONE ENCOUNTER
[email or web address].wikipedia.org/wiki/[email or web address]    Spiders are fairly common in the homes at this time of the year given the colder temperatures. If there is no significant inflammation redness or pain I think observation is reasonable and if this progresses we can see her later this week. If she is extremely concerned there is definitely nothing wrong with having this checked sooner.

## 2021-11-01 ENCOUNTER — HOSPITAL ENCOUNTER (OUTPATIENT)
Dept: MAMMOGRAPHY | Age: 81
Discharge: HOME | End: 2021-11-03
Payer: MEDICARE

## 2021-11-01 DIAGNOSIS — Z12.31 ENCOUNTER FOR SCREENING MAMMOGRAM FOR BREAST CANCER: ICD-10-CM

## 2021-11-01 PROCEDURE — 77067 SCR MAMMO BI INCL CAD: CPT

## 2021-11-02 ENCOUNTER — OFFICE VISIT (OUTPATIENT)
Dept: CARDIOLOGY CLINIC | Age: 81
End: 2021-11-02
Payer: MEDICARE

## 2021-11-02 VITALS
BODY MASS INDEX: 33.26 KG/M2 | SYSTOLIC BLOOD PRESSURE: 132 MMHG | HEIGHT: 64 IN | DIASTOLIC BLOOD PRESSURE: 78 MMHG | WEIGHT: 194.8 LBS | RESPIRATION RATE: 16 BRPM | HEART RATE: 75 BPM

## 2021-11-02 DIAGNOSIS — I10 ESSENTIAL HYPERTENSION: ICD-10-CM

## 2021-11-02 DIAGNOSIS — I48.21 PERMANENT ATRIAL FIBRILLATION (HCC): Primary | ICD-10-CM

## 2021-11-02 DIAGNOSIS — Z79.01 CHRONIC ANTICOAGULATION: ICD-10-CM

## 2021-11-02 PROCEDURE — G8427 DOCREV CUR MEDS BY ELIG CLIN: HCPCS | Performed by: INTERNAL MEDICINE

## 2021-11-02 PROCEDURE — 93000 ELECTROCARDIOGRAM COMPLETE: CPT | Performed by: INTERNAL MEDICINE

## 2021-11-02 PROCEDURE — 99213 OFFICE O/P EST LOW 20 MIN: CPT | Performed by: INTERNAL MEDICINE

## 2021-11-02 PROCEDURE — G8484 FLU IMMUNIZE NO ADMIN: HCPCS | Performed by: INTERNAL MEDICINE

## 2021-11-02 PROCEDURE — 4040F PNEUMOC VAC/ADMIN/RCVD: CPT | Performed by: INTERNAL MEDICINE

## 2021-11-02 PROCEDURE — 1123F ACP DISCUSS/DSCN MKR DOCD: CPT | Performed by: INTERNAL MEDICINE

## 2021-11-02 PROCEDURE — 1036F TOBACCO NON-USER: CPT | Performed by: INTERNAL MEDICINE

## 2021-11-02 PROCEDURE — G8399 PT W/DXA RESULTS DOCUMENT: HCPCS | Performed by: INTERNAL MEDICINE

## 2021-11-02 PROCEDURE — G8417 CALC BMI ABV UP PARAM F/U: HCPCS | Performed by: INTERNAL MEDICINE

## 2021-11-02 PROCEDURE — 1090F PRES/ABSN URINE INCON ASSESS: CPT | Performed by: INTERNAL MEDICINE

## 2021-11-02 NOTE — PROGRESS NOTES
Patient Active Problem List   Diagnosis    Osteoarthritis of right knee    [facility]ometrial cancer ([facility] [address]    GERD (gastroesophageal reflux disease)    Spondylosis of lumbosacral joint    Obesity    Essential hypertension    Mixed hyperlipidemia    Type 2 diabetes mellitus without complication (HCC)    Permanent atrial fibrillation (HCC)    Chronic anticoagulation       Current Outpatient Medications   Medication Sig Dispense Refill    apixaban (ELIQUIS) 5 MG TABS tablet Take 1 tablet by mouth 2 times daily 60 tablet 5    blood glucose monitor strips Test 2 times a day & as needed for symptoms of irregular blood glucose. Dispense sufficient amount for indicated testing frequency plus additional to accommodate PRN testing needs. 100 strip 5    FreeStyle Lancets MISC 1 each by Does not apply route 2 times daily 100 each 5    Alcohol Swabs PADS 1 each by Does not apply route 4 times daily 100 each 5    blood glucose monitor kit and supplies As per patients insurance coverage; please dispense sufficient amount for indicated testing 2 x daily plus additional to accommodate PRN testing needs. #90 day supply with one refill. Dispense all needed supplies to include: monitor, strips, lancing device, lancets, control solutions, alcohol swabs.  1 kit 0    valsartan (DIOVAN) 40 MG tablet TAKE 1 TABLET BY MOUTH EVERY DAY 90 tablet 1    metoprolol succinate (TOPROL XL) 50 MG extended release tablet TAKE 1 TABLET BY MOUTH TWICE A  tablet 1    Handicap Placard MISC by Does not apply route 1 each 0    vitamin C (ASCORBIC ACID) 500 MG tablet Take 1,000 mg by mouth 2 times daily      BETA CAROTENE PO Take 1 tablet by mouth daily      acetaminophen (APAP EXTRA STRENGTH) 500 MG tablet Take 1 tablet by mouth every 6 hours as needed for Pain 20 tablet 0    Coenzyme Q10 (COQ-10) 200 MG CAPS Take 1 capsule by mouth daily      CRANBERRY PO Take 2 tablets by mouth daily       Multiple Vitamins-Minerals (EYE VITAMINS PO) Take 1 tablet by mouth daily       Cinnamon 500 MG TABS Take 1 tablet by mouth daily       Multiple Minerals-Vitamins (CALCIUM-MAGNESIUM-ZINC-D3) TABS Take 1 tablet by mouth daily       Multiple Vitamin (MULTIVITAMIN PO) Take 1 tablet by mouth daily        No current facility-administered medications for this visit. CC:    Patient is seen in follow up for:  1. Permanent atrial fibrillation ([address]    2. Essential hypertension    3. Chronic anticoagulation        HPI:  Notes some increased fatigue. However has gained weight. Patient denies any shortness of breath, chest pain, lightheadedness or dizziness. Already medications well without side effects. ROS:   General: No unusual weight gain, no change in exercise tolerance  Skin: No rash or itching  EENT: No vision changes or nosebleeds  Cardiovascular: No orthopnea or paroxysmal nocturnal dyspnea  Respiratory: No cough or hemoptysis  Gastrointestinal: No hematemesis or recent changes in bowel habits  Genitourinary: No hematuria, urgency or frequency  Musculoskeletal: No muscular weakness or joint swelling   Neurologic / Psychiatric: No incoordination or convulsions  Allergic / Immunologic/ Lymphatic / Endocrine: No anemia or bleeding tendency    Social History     Socioeconomic History    Marital status:       Spouse name: Not on file    Number of children: 0    Years of education: 6    Highest education level: Associate degree: occupational, technical, or vocational program   Occupational History    Occupation: beautician   Tobacco Use    Smoking status: Never Smoker    Smokeless tobacco: Never Used   Vaping Use    Vaping Use: Never used   Substance and Sexual Activity    Alcohol use: No    Drug use: No    Sexual activity: Not Currently   Other Topics Concern    Not on file   Social History Narrative    Not on file     Social Determinants of Health     Financial Resource Strain:     Difficulty of Paying Living Expenses:    Food Insecurity:     Worried About Running Out of Food in the Last Year:     [address] in the Last Year:    Transportation Needs:     Lack of Transportation (Medical):  Lack of Transportation (Non-Medical):    Physical Activity:     Days of Exercise per Week:     Minutes of Exercise per Session:    Stress:     Feeling of Stress :    Social Connections:     Frequency of Communication with Friends and Family:     Frequency of Social Gatherings with Friends and Family:     Attends Anab[employer] Services:     Active Member of Clubs or Organizations:     Attends Club or Organization Meetings:     Marital Status:    Intimate Partner Violence:     Fear of Current or Ex-Partner:     Emotionally Abused:     Physically Abused:     Sexually Abused:        Family History   Problem Relation Age of Onset    Diabetes Mother     Heart Disease Brother     Heart Disease Father        Past Medical History:   Diagnosis Date    Arthritis     Bilateral carpal tunnel syndrome     right more than left    Diabetes mellitus ([facility] [address]     diet controlled & cinnamon    DM2 (diabetes mellitus, type 2) ([facility] [address] 2/16/2012    patient states not diabetic, family hx of diabetes; A1C-6.2    Endometrial cancer ([facility] [address] 1992    early small cured    GERD (gastroesophageal reflux disease)     HTN (hypertension) 2/16/2012    Hyperlipidemia     borderline- no meds    Hypertension     controlled    Obesity 5/28/2013    Osteoarthritis     Osteoarthritis of right knee     [place]    Sinus drainage     seasonal (winter)    Spondylosis of lumbosacral joint     Voice absence     only with anxiety       PHYSICAL EXAM:  CONSTITUTIONAL:  Well developed, well nourished    Vitals:    11/02/21 0944   BP: 132/78   Pulse: 75   Resp: 16   Weight: 194 lb 12.8 oz (88.4 kg)   Height: 5' 4\" (1.626 m)     HEAD & FACE: Normocephalic. Symmetric. EYES: No xanthelasma. Conjunctivae not injected. EARS, NOSE, MOUTH & THROAT: Good dentition.   No oral pallor or cyanosis. NECK: No JVD at 30 degrees. No thyromegaly. RESPIRATORY: Clear to auscultation and percussion in all fields. No use of accessory muscle or intercostal retractions. CARDIOVASCULAR: Irregularly irregular variable intensity of S1 normal S2. No carotid bruits. Abdominal aorta not enlarged. Femoral arteries without bruits. Pedal pulses 2+. No edema. ABDOMEN: Soft without hepatic or splenic enlargement. No tenderness. MUSCULOSKELETAL: No kyphosis or scoliosis of the back. Good muscle strength and tone. No muscle atrophy. Normal gait and ability to undergo exercise stress testing. EXTREMITIES: No clubbing or cyanosis. SKIN: No Xanthomas or ulcerations. NEUROLOGIC: Oriented to time, place and person. Normal mood and affect. LYMPHATIC:  No palpable neck or supraclavicular nodes. No splenomegaly. EKG: the EKG tracing was reviewed and found to reveal: Atrial fibrillation with controlled response QTc 386. ASSESSMENT:                                                     ORDERS:       Diagnosis Orders   1. Permanent atrial fibrillation (HCC)  EKG 12 lead   2. Essential hypertension  EKG 12 lead   3. Chronic anticoagulation  EKG 12 lead     Above assessment cardiac issues stable. PLAN:   See above orders. Old records were reviewed and found to reveal: Normal CMP on 12/30/2019. Scheduled to see Dr. Angie Combs for her fatigue. Continue current medical therapy. Discussed issues that would prompt earlier evaluation. Follow-up office visit in 12 months.

## 2021-11-03 ENCOUNTER — TELEPHONE (OUTPATIENT)
Dept: FAMILY MEDICINE CLINIC | Age: 81
End: 2021-11-03

## 2021-11-03 NOTE — TELEPHONE ENCOUNTER
Patient called and would like to know if Dr Kirsty Caro can write a letter to \"[employer]\" stating that patient cannot mow grass or shovel snow due to health conditions. They will assist her in getting this done with a letter from you.      Once complete, letter can be faxed to [facility] at [phone]

## 2021-11-18 ENCOUNTER — HOSPITAL ENCOUNTER (OUTPATIENT)
Age: 81
Discharge: HOME | End: 2021-11-18
Payer: MEDICARE

## 2021-11-18 ENCOUNTER — OFFICE VISIT (OUTPATIENT)
Dept: FAMILY MEDICINE CLINIC | Age: 81
End: 2021-11-18
Payer: MEDICARE

## 2021-11-18 VITALS
DIASTOLIC BLOOD PRESSURE: 80 MMHG | RESPIRATION RATE: 18 BRPM | SYSTOLIC BLOOD PRESSURE: 155 MMHG | HEART RATE: 92 BPM | HEIGHT: 64 IN | OXYGEN SATURATION: 98 % | WEIGHT: 192 LBS | BODY MASS INDEX: 32.78 KG/M2 | TEMPERATURE: 97.6 F

## 2021-11-18 DIAGNOSIS — E11.9 TYPE 2 DIABETES MELLITUS WITHOUT COMPLICATION, WITHOUT LONG-TERM CURRENT USE OF INSULIN (HCC): ICD-10-CM

## 2021-11-18 DIAGNOSIS — R53.81 PHYSICAL DECONDITIONING: Primary | ICD-10-CM

## 2021-11-18 DIAGNOSIS — I10 ESSENTIAL HYPERTENSION: ICD-10-CM

## 2021-11-18 DIAGNOSIS — C54.1 ENDOMETRIAL CANCER (HCC): ICD-10-CM

## 2021-11-18 LAB
ALBUMIN SERPL-MCNC: 4 G/DL (ref 3.5–5.2)
ALP BLD-CCNC: 130 U/L (ref 35–104)
ALT SERPL-CCNC: 16 U/L (ref 0–32)
ANION GAP SERPL CALCULATED.3IONS-SCNC: 11 MMOL/L (ref 7–16)
AST SERPL-CCNC: 24 U/L (ref 0–31)
BILIRUB SERPL-MCNC: 0.7 MG/DL (ref 0–1.2)
BUN BLDV-MCNC: 10 MG/DL (ref 6–23)
CALCIUM SERPL-MCNC: 9.3 MG/DL (ref 8.6–10.2)
CHLORIDE BLD-SCNC: 103 MMOL/L (ref 98–107)
CHOLESTEROL, FASTING: 180 MG/DL (ref 0–199)
CO2: 25 MMOL/L (ref 22–29)
CREAT SERPL-MCNC: 0.8 MG/DL (ref 0.5–1)
GFR AFRICAN AMERICAN: >60
GFR NON-AFRICAN AMERICAN: >60 ML/MIN/1.73
GLUCOSE BLD-MCNC: 144 MG/DL (ref 74–99)
HBA1C MFR BLD: 6.2 % (ref 4–5.6)
HDLC SERPL-MCNC: 35 MG/DL
LDL CHOLESTEROL CALCULATED: 111 MG/DL (ref 0–99)
POTASSIUM SERPL-SCNC: 4.3 MMOL/L (ref 3.5–5)
SODIUM BLD-SCNC: 139 MMOL/L (ref 132–146)
TOTAL PROTEIN: 8.3 G/DL (ref 6.4–8.3)
TRIGLYCERIDE, FASTING: 170 MG/DL (ref 0–149)
TSH SERPL DL<=0.05 MIU/L-ACNC: 1.35 UIU/ML (ref 0.27–4.2)
VLDLC SERPL CALC-MCNC: 34 MG/DL

## 2021-11-18 PROCEDURE — 83036 HEMOGLOBIN GLYCOSYLATED A1C: CPT

## 2021-11-18 PROCEDURE — 1123F ACP DISCUSS/DSCN MKR DOCD: CPT | Performed by: FAMILY MEDICINE

## 2021-11-18 PROCEDURE — 1036F TOBACCO NON-USER: CPT | Performed by: FAMILY MEDICINE

## 2021-11-18 PROCEDURE — G8484 FLU IMMUNIZE NO ADMIN: HCPCS | Performed by: FAMILY MEDICINE

## 2021-11-18 PROCEDURE — 36415 COLL VENOUS BLD VENIPUNCTURE: CPT

## 2021-11-18 PROCEDURE — G8417 CALC BMI ABV UP PARAM F/U: HCPCS | Performed by: FAMILY MEDICINE

## 2021-11-18 PROCEDURE — 1090F PRES/ABSN URINE INCON ASSESS: CPT | Performed by: FAMILY MEDICINE

## 2021-11-18 PROCEDURE — 80061 LIPID PANEL: CPT

## 2021-11-18 PROCEDURE — 84443 ASSAY THYROID STIM HORMONE: CPT

## 2021-11-18 PROCEDURE — 80053 COMPREHEN METABOLIC PANEL: CPT

## 2021-11-18 PROCEDURE — G8427 DOCREV CUR MEDS BY ELIG CLIN: HCPCS | Performed by: FAMILY MEDICINE

## 2021-11-18 PROCEDURE — G8399 PT W/DXA RESULTS DOCUMENT: HCPCS | Performed by: FAMILY MEDICINE

## 2021-11-18 PROCEDURE — 4040F PNEUMOC VAC/ADMIN/RCVD: CPT | Performed by: FAMILY MEDICINE

## 2021-11-18 PROCEDURE — 99214 OFFICE O/P EST MOD 30 MIN: CPT | Performed by: FAMILY MEDICINE

## 2021-11-18 RX ORDER — VALSARTAN 40 MG/1
TABLET ORAL
Qty: 90 TABLET | Refills: 1 | Status: CANCELLED | OUTPATIENT
Start: 2021-11-18

## 2021-11-18 RX ORDER — MULTIVIT WITH MINERALS/LUTEIN
1000 TABLET ORAL DAILY
COMMUNITY
End: 2022-11-01 | Stop reason: CLARIF

## 2021-11-18 RX ORDER — VALSARTAN 80 MG/1
80 TABLET ORAL DAILY
Qty: 30 TABLET | Refills: 3 | Status: SHIPPED
Start: 2021-11-18 | End: 2022-01-03

## 2021-11-18 RX ORDER — LANOLIN ALCOHOL/MO/W.PET/CERES
1000 CREAM (GRAM) TOPICAL DAILY
COMMUNITY

## 2021-11-18 SDOH — ECONOMIC STABILITY: FOOD INSECURITY: WITHIN THE PAST 12 MONTHS, THE FOOD YOU BOUGHT JUST DIDN'T LAST AND YOU DIDN'T HAVE MONEY TO GET MORE.: NEVER TRUE

## 2021-11-18 SDOH — ECONOMIC STABILITY: FOOD INSECURITY: WITHIN THE PAST 12 MONTHS, YOU WORRIED THAT YOUR FOOD WOULD RUN OUT BEFORE YOU GOT MONEY TO BUY MORE.: NEVER TRUE

## 2021-11-18 ASSESSMENT — ENCOUNTER SYMPTOMS
ABDOMINAL PAIN: 0
CHEST TIGHTNESS: 0
SHORTNESS OF BREATH: 0

## 2021-11-18 ASSESSMENT — SOCIAL DETERMINANTS OF HEALTH (SDOH): HOW HARD IS IT FOR YOU TO PAY FOR THE VERY BASICS LIKE FOOD, HOUSING, MEDICAL CARE, AND HEATING?: NOT HARD AT ALL

## 2021-11-18 NOTE — PROGRESS NOTES
[facility]  Family Medicine Residency  Phone: [phone]  Fax: [phone]    Patient:  Mylene Gomez [de-identified] y.o. female                                 Date of Service: 11/18/21                            Chiefcomplaint:   Chief Complaint   Patient presents with    Hypertension    Diabetes         History of Present Illness: The patient is a [de-identified] y.o. female  presented to the clinic with complaints as above. Deconditioning/Memory - friends have reported some memory issues. She has forgotten names. She is nervous she will miss an appointment and has started tracking this on the calender. Has not been lost with visualspatial sx. She does understand the difference between being busy/distracted and memory impairment. She wants to wait until after the holidays and see if memory sx persist after things calm down. She is also reporting some slow declines in physical strength as she ages. There are no abrupt changes. htn - no CV sx - tolerating meds    afib - taking eliquis. No bleeding issues, no internal bleeding sx. GERD - sx controlled -patient reports no ongoing symptoms of reflux no dyspepsia no symptoms of internal bleeding. Review of Systems:   Review of Systems   Respiratory: Negative for chest tightness and shortness of breath. Cardiovascular: Negative for chest pain and palpitations. Gastrointestinal: Negative for abdominal pain.        Past Medical History:      Diagnosis Date    Arthritis     Bilateral carpal tunnel syndrome     right more than left    Diabetes mellitus ([address]     diet controlled & cinnamon    DM2 (diabetes mellitus, type 2) ([address] 2/16/2012    patient states not diabetic, family hx of diabetes; A1C-6.2    Endometrial cancer ([address] 1992    early small cured    GERD (gastroesophageal reflux disease)     HTN (hypertension) 2/16/2012    Hyperlipidemia     borderline- no meds    Hypertension     controlled    Obesity 5/28/2013    Osteoarthritis     Osteoarthritis of right knee     [place] Sinus drainage     seasonal (winter)    Spondylosis of lumbosacral joint     Voice absence     only with anxiety       Past Surgical History:        Procedure Laterality Date    COLONOSCOPY  [address]    1 hyperplastic polyp 2mm    HYSTERECTOMY  1990's    AZALEA BSO    JOINT REPLACEMENT Right     Knee    OTHER SURGICAL HISTORY  3/11/2015    EGD with Biopsy    AZALEA AND BSO      1990's AZALEA and BSO    TONSILLECTOMY      age 13   [place]en [place] TOTAL KNEE ARTHROPLASTY  2/2012    right    UPPER GASTROINTESTINAL ENDOSCOPY  08       Allergies:    Penicillins, Aspirin, Losartan potassium-hctz, Omeprazole, Protonix [pantoprazole], and Cyclobenzaprine    Social History:   Social History     Socioeconomic History    Marital status:      Spouse name: Not on file    Number of children: 0    Years of education: 6    Highest education level: Associate degree: occupational, technical, or vocational program   Occupational History    Occupation: beautician   Tobacco Use    Smoking status: Never Smoker    Smokeless tobacco: Never Used   Vaping Use    Vaping Use: Never used   Substance and Sexual Activity    Alcohol use: No    Drug use: No    Sexual activity: Not Currently   Other Topics Concern    Not on file   Social History Narrative    Not on file     Social Determinants of Health     Financial Resource Strain: Low Risk     Difficulty of Paying Living Expenses: Not hard at all   Food Insecurity: No Food Insecurity    Worried About [address] in the Last Year: Never true    [address] [facility] [address] [facility] [address] in the Last Year: Never true   Transportation Needs:     Lack of Transportation (Medical): Not on file    Lack of Transportation (Non-Medical):  Not on file   Physical Activity:     Days of Exercise per Week: Not on file    Minutes of Exercise per Session: Not on file   Stress:     Feeling of Stress : Not on file   Social Connections:     Frequency of Communication with Friends and Family: Not on file    Frequency of Social Gatherings with Friends and Family: Not on file    Attends Ep[employer] Services: Not on file    Active Member of Clubs or Organizations: Not on file    Attends Club or Organization Meetings: Not on file    Marital Status: Not on file   Intimate Partner Violence:     Fear of Current or Ex-Partner: Not on file    Emotionally Abused: Not on file    Physically Abused: Not on file    Sexually Abused: Not on file   Housing Stability:     Unable to Pay for Housing in the Last Year: Not on file    Number of [place] in the Last Year: Not on file    Unstable Housing in the Last Year: Not on file        Family History:       Problem Relation Age of Onset    Diabetes Mother     Heart Disease Brother     Heart Disease Father        Physical Exam:    Vitals: BP (!) 155/80   Pulse 92   Temp 97.6 °F (36.4 °C)   Resp 18   Ht 5' 4\" (1.626 m)   Wt 192 lb (87.1 kg)   SpO2 98%   BMI 32.96 kg/m²   BP Readings from Last 3 Encounters:   11/18/21 (!) 155/80   11/02/21 132/78   05/17/21 116/61     General Appearance: Well developed, awake, alert, oriented, no acute distress  Chest wall/Lung: Clear to auscultation bilaterally,  respirations unlabored. No ronchi/wheezing/rales  Heart[de-identified]  Regular rate and rhythm, S1and S2 normal, no murmur, rub or gallop. Abdomen: Soft, non-tender, bowel sounds normoactive, no masses, no organomegaly  Extremities:  Extremities normal, atraumatic, no cyanosis. +tr edema. Neurologic:   Alert&Oriented. Normal gait and coordination  No focal neurological deficits appreaciated         Psychiatric: has a normal mood and affect. Behavior is normal.       Assessment and Plan:         1. Essential hypertension  Blood pressure is elevated will titrate Diovan from 40 up to 80 mg we will recheck in approximately 2 weeks adjust at that time we will collect labs today.  - valsartan (DIOVAN) 80 MG tablet;  Take 1 tablet by mouth daily  Dispense: 30 tablet; Refill: 3  - COMPREHENSIVE METABOLIC PANEL; Future  - TSH; Future    2. Physical deconditioning  Refer to physical therapy ideally for strengthening.  - External Referral To Physical Therapy    3. Endometrial cancer ([facility]  No ongoing symptoms at this time. 4. Type 2 diabetes mellitus without complication, without long-term current use of insulin ([facility]  We will check labs patient does not follow sugars at home no complaints about this condition at this time prefers to stay off medicines if possible. - COMPREHENSIVE METABOLIC PANEL; Future  - Lipid, Fasting; Future  - HEMOGLOBIN A1C; Future        Return to Office: Return in about 2 months (around 1/18/2022) for High Blood Pressure. I encourage further reading and education about your health conditions. Information on many healthconditions is provided by the American Academy of Family Physicians: https://familydoctor. org/  Please bring any questions to me at your next visit. This document may have been prepared at least partiallythrough the use of voice recognition software. Although effort is taken to assure the accuracy of this document, it is possible that grammatical, syntax,  or spelling errors may occur. Medication List:    Current Outpatient Medications   Medication Sig Dispense Refill    vitamin B-12 (CYANOCOBALAMIN) 1000 MCG tablet Take 1,000 mcg by mouth daily      ELDERBERRY PO Take 100 mg by mouth daily      vitamin E 1000 units capsule Take 1,000 Units by mouth daily      valsartan (DIOVAN) 80 MG tablet Take 1 tablet by mouth daily 30 tablet 3    apixaban (ELIQUIS) 5 MG TABS tablet Take 1 tablet by mouth 2 times daily 60 tablet 5    blood glucose monitor strips Test 2 times a day & as needed for symptoms of irregular blood glucose. Dispense sufficient amount for indicated testing frequency plus additional to accommodate PRN testing needs.  100 strip 5    FreeStyle Lancets MISC 1 each by Does not apply route 2 times daily 100 each 5    Alcohol Swabs PADS 1 each by Does not apply route 4 times daily 100 each 5    blood glucose monitor kit and supplies As per patients insurance coverage; please dispense sufficient amount for indicated testing 2 x daily plus additional to accommodate PRN testing needs. #90 day supply with one refill. Dispense all needed supplies to include: monitor, strips, lancing device, lancets, control solutions, alcohol swabs. 1 kit 0    metoprolol succinate (TOPROL XL) 50 MG extended release tablet TAKE 1 TABLET BY MOUTH TWICE A  tablet 1    Handicap Placard MISC by Does not apply route 1 each 0    vitamin C (ASCORBIC ACID) 500 MG tablet Take 1,000 mg by mouth 2 times daily      BETA CAROTENE PO Take 1 tablet by mouth daily      acetaminophen (APAP EXTRA STRENGTH) 500 MG tablet Take 1 tablet by mouth every 6 hours as needed for Pain 20 tablet 0    Coenzyme Q10 (COQ-10) 200 MG CAPS Take 1 capsule by mouth daily      CRANBERRY PO Take 2 tablets by mouth daily       Multiple Vitamins-Minerals (EYE VITAMINS PO) Take 1 tablet by mouth daily       Cinnamon 500 MG TABS Take 1 tablet by mouth daily       Multiple Minerals-Vitamins (CALCIUM-MAGNESIUM-ZINC-D3) TABS Take 1 tablet by mouth daily       Multiple Vitamin (MULTIVITAMIN PO) Take 1 tablet by mouth daily        No current facility-administered medications for this visit.         Camille Huggins MD

## 2021-12-04 DIAGNOSIS — I48.91 ATRIAL FIBRILLATION, UNSPECIFIED TYPE (HCC): ICD-10-CM

## 2021-12-06 RX ORDER — METOPROLOL SUCCINATE 50 MG/1
TABLET, EXTENDED RELEASE ORAL
Qty: 180 TABLET | Refills: 1 | Status: SHIPPED
Start: 2021-12-06 | End: 2022-06-20 | Stop reason: SDUPTHER

## 2021-12-08 ENCOUNTER — TELEPHONE (OUTPATIENT)
Dept: FAMILY MEDICINE CLINIC | Age: 81
End: 2021-12-08

## 2021-12-08 NOTE — TELEPHONE ENCOUNTER
From UpToDate about anticoagulation management abdelrahman-operatively see below. I feel that 1 day of holding eliquis (2 doses) with resumption of this med at the usual dose the next day after the procedure is the best management. This would total 4 missed pills. If the surgeon has concerns have them call our office. Elida Olivo MD                Apixaban -- Apixaban is a direct factor Xa inhibitor; it reversibly blocks the enzymatic function of factor Xa in converting prothrombin to thrombin. ? Discontinuation - Apixaban can be omitted for one day before a low/moderate bleeding risk procedure and for two days before a high bleeding risk procedure (figure 1). Thus, for low/moderate bleeding risk procedures, the patient will omit two dose of apixaban on the one day before the procedure; for high bleeding risk procedures, the patient will omit four doses of apixaban on the two days before the procedure. These intervals are based on an apixaban elimination half-life of 8 to 12 hours. These intervals apply to individuals with normal kidney function or mildly impaired kidney function (CrCl >50 mL/min), who are likely to be receiving the 5 mg twice daily dose; and to those with moderate to severe kidney insufficiency (CrCl 30 to 50 mL/min), who are likely to be receiving the 2.5 mg twice daily dose.

## 2022-01-03 DIAGNOSIS — I10 ESSENTIAL HYPERTENSION: ICD-10-CM

## 2022-01-03 RX ORDER — VALSARTAN 80 MG/1
TABLET ORAL
Qty: 90 TABLET | Refills: 1 | Status: SHIPPED
Start: 2022-01-03 | End: 2022-03-28

## 2022-03-28 ENCOUNTER — OFFICE VISIT (OUTPATIENT)
Dept: FAMILY MEDICINE CLINIC | Age: 82
End: 2022-03-28
Payer: MEDICARE

## 2022-03-28 VITALS
OXYGEN SATURATION: 97 % | WEIGHT: 190 LBS | BODY MASS INDEX: 32.44 KG/M2 | HEIGHT: 64 IN | HEART RATE: 74 BPM | DIASTOLIC BLOOD PRESSURE: 92 MMHG | SYSTOLIC BLOOD PRESSURE: 138 MMHG | TEMPERATURE: 97.8 F | RESPIRATION RATE: 16 BRPM

## 2022-03-28 DIAGNOSIS — K21.9 GASTROESOPHAGEAL REFLUX DISEASE WITHOUT ESOPHAGITIS: ICD-10-CM

## 2022-03-28 DIAGNOSIS — I48.21 PERMANENT ATRIAL FIBRILLATION (HCC): ICD-10-CM

## 2022-03-28 DIAGNOSIS — Z79.01 CHRONIC ANTICOAGULATION: ICD-10-CM

## 2022-03-28 DIAGNOSIS — E11.9 TYPE 2 DIABETES MELLITUS WITHOUT COMPLICATION, WITHOUT LONG-TERM CURRENT USE OF INSULIN (HCC): ICD-10-CM

## 2022-03-28 DIAGNOSIS — I48.91 ATRIAL FIBRILLATION, UNSPECIFIED TYPE (HCC): ICD-10-CM

## 2022-03-28 DIAGNOSIS — I10 ESSENTIAL HYPERTENSION: Primary | ICD-10-CM

## 2022-03-28 LAB — HBA1C MFR BLD: 6.3 %

## 2022-03-28 PROCEDURE — 4040F PNEUMOC VAC/ADMIN/RCVD: CPT | Performed by: FAMILY MEDICINE

## 2022-03-28 PROCEDURE — G8484 FLU IMMUNIZE NO ADMIN: HCPCS | Performed by: FAMILY MEDICINE

## 2022-03-28 PROCEDURE — G8417 CALC BMI ABV UP PARAM F/U: HCPCS | Performed by: FAMILY MEDICINE

## 2022-03-28 PROCEDURE — 1036F TOBACCO NON-USER: CPT | Performed by: FAMILY MEDICINE

## 2022-03-28 PROCEDURE — 1090F PRES/ABSN URINE INCON ASSESS: CPT | Performed by: FAMILY MEDICINE

## 2022-03-28 PROCEDURE — 1123F ACP DISCUSS/DSCN MKR DOCD: CPT | Performed by: FAMILY MEDICINE

## 2022-03-28 PROCEDURE — 99214 OFFICE O/P EST MOD 30 MIN: CPT | Performed by: FAMILY MEDICINE

## 2022-03-28 PROCEDURE — G8427 DOCREV CUR MEDS BY ELIG CLIN: HCPCS | Performed by: FAMILY MEDICINE

## 2022-03-28 PROCEDURE — 3044F HG A1C LEVEL LT 7.0%: CPT | Performed by: FAMILY MEDICINE

## 2022-03-28 PROCEDURE — 83036 HEMOGLOBIN GLYCOSYLATED A1C: CPT | Performed by: FAMILY MEDICINE

## 2022-03-28 PROCEDURE — G8399 PT W/DXA RESULTS DOCUMENT: HCPCS | Performed by: FAMILY MEDICINE

## 2022-03-28 RX ORDER — IRBESARTAN 150 MG/1
150 TABLET ORAL DAILY
Qty: 90 TABLET | Refills: 1 | Status: SHIPPED
Start: 2022-03-28 | End: 2022-06-20 | Stop reason: SDUPTHER

## 2022-03-28 ASSESSMENT — ENCOUNTER SYMPTOMS
SHORTNESS OF BREATH: 0
ABDOMINAL PAIN: 0
CHEST TIGHTNESS: 0

## 2022-03-28 NOTE — PROGRESS NOTES
[facility]  Family Medicine Residency  Phone: [phone]  Fax: [phone]    Patient:  Benjamin Chanel [de-identified] y.o. female                                 Date of Service: 3/28/22                            Chiefcomplaint:   Chief Complaint   Patient presents with    Discuss Medications     patient states that she has been having itching all over since starting the diovan ( valsartan)           History of Present Illness: The patient is a [de-identified] y.o. female  presented to the clinic with complaints as above. itching - she is having itching since starting diovan. She is describing itch over much of the torso and the limbs. No rashes reported. This is not listed as a usual adverse effect with this med. Patient feels the itching started after starting valsartan it has not been progressive but it has been annoying her enough that she wishes to change this medicine. afib -rate controlled no palpitations no chest pain worsening of shortness of breath tolerating anticoagulation no bleeding episodes to report    HTN -cardiovascular symptoms including no lightheadedness dizziness chest pain shortness of breath dyspnea on exertion that is worse than usual.    Dm2 -patient is not following blood sugars regularly reports no symptoms of hyper or hypoglycemia    Review of Systems:   Review of Systems   Respiratory: Negative for chest tightness and shortness of breath. Cardiovascular: Negative for chest pain and palpitations. Gastrointestinal: Negative for abdominal pain.        Past Medical History:      Diagnosis Date    Arthritis     Bilateral carpal tunnel syndrome     right more than left    Diabetes mellitus ([address]     diet controlled & cinnamon    DM2 (diabetes mellitus, type 2) ([address] 2/16/2012    patient states not diabetic, family hx of diabetes; A1C-6.2    Endometrial cancer ([address] 1992    early small cured    GERD (gastroesophageal reflux disease)     HTN (hypertension) 2/16/2012    Hyperlipidemia     borderline- no meds    Hypertension     controlled    Obesity 5/28/2013    Osteoarthritis     Osteoarthritis of right knee     [place]    Sinus drainage     seasonal (winter)    Spondylosis of lumbosacral joint     Voice absence     only with anxiety       Past Surgical History:        Procedure Laterality Date    CATARACT REMOVAL  2022    COLONOSCOPY  [address]    1 hyperplastic polyp 2mm    HYSTERECTOMY  1990's    AZALEA BSO    JOINT REPLACEMENT Right     Knee    OTHER SURGICAL HISTORY  03/11/2015    EGD with Biopsy    AZALEA AND BSO      1990's AZALEA and BSO    TONSILLECTOMY      age 16    TOTAL KNEE ARTHROPLASTY  02/2012    right    UPPER GASTROINTESTINAL ENDOSCOPY  2008       Allergies:    Penicillins, Aspirin, Losartan potassium-hctz, Omeprazole, Protonix [pantoprazole], and Cyclobenzaprine    Social History:   Social History     Socioeconomic History    Marital status:      Spouse name: Not on file    Number of children: 0    Years of education: 6    Highest education level: Associate degree: occupational, technical, or vocational program   Occupational History    Occupation: beautician   Tobacco Use    Smoking status: Never Smoker    Smokeless tobacco: Never Used   Vaping Use    Vaping Use: Never used   Substance and Sexual Activity    Alcohol use: No    Drug use: No    Sexual activity: Not Currently   Other Topics Concern    Not on file   Social History Narrative    Not on file     Social Determinants of Health     Financial Resource Strain: Low Risk     Difficulty of Paying Living Expenses: Not hard at all   Food Insecurity: No Food Insecurity    Worried About [address] in the Last Year: Never true    [address] [facility] in the Last Year: Never true   Transportation Needs:     Lack of Transportation (Medical): Not on file    Lack of Transportation (Non-Medical):  Not on file   Physical Activity:     Days of Exercise per Week: Not on file   [employer] of Exercise per Session: Not on file   Stress:     Feeling of Stress : Not on file   Social Connections:     Frequency of Communication with Friends and Family: Not on file    Frequency of Social Gatherings with Friends and Family: Not on file    Attends Church Services: Not on file    Active Member of [address] [place] [address] or Organizations: Not on file    Attends Club or Organization Meetings: Not on file    Marital Status: Not on file   Intimate Partner Violence:     Fear of Current or Ex-Partner: Not on file    Emotionally Abused: Not on file    Physically Abused: Not on file    Sexually Abused: Not on file   Housing Stability:     Unable to Pay for Housing in the Last Year: Not on file    Number of [place] in the Last Year: Not on file    Unstable Housing in the Last Year: Not on file        Family History:       Problem Relation Age of Onset    Diabetes Mother     Heart Disease Brother     Heart Disease Father        Physical Exam:    Vitals: BP (!) 138/92   Pulse 74   Temp 97.8 °F (36.6 °C) (Temporal)   Resp 16   Ht 5' 4\" (1.626 m)   Wt 190 lb (86.2 kg)   SpO2 97%   BMI 32.61 kg/m²   BP Readings from Last 3 Encounters:   03/28/22 (!) 138/92   11/18/21 (!) 155/80   11/02/21 132/78     General Appearance: Well developed, awake, alert, oriented, no acute distress  Chest wall/Lung: Clear to auscultation bilaterally,  respirations unlabored. No ronchi/wheezing/rales  Heart[de-identified]  Irregular rate and rhythm, S1and S2 normal, no murmur, rub or gallop. Abdomen: Soft, non-tender, bowel sounds normoactive, no masses, no organomegaly  Extremities:  Extremities normal, atraumatic, no cyanosis. +2 edema. Skin: Excoriations appreciated with some areas of dryness otherwise relatively normal-appearing skin. Neurologic:   Alert&Oriented. Normal gait and coordination  No focal neurological deficits appreaciated         Psychiatric: has a normal mood and affect.  Behavior is normal. Assessment and Plan:         1. Atrial fibrillation, unspecified type ([facility]  Rate controlled continue current medications. 2. Type 2 diabetes mellitus without complication, without long-term current use of insulin ([facility]  Controlled continue diet and exercise. - POCT glycosylated hemoglobin (Hb A1C)    3. Essential hypertension  We will make conversion to irbesartan and stop valsartan as this is potentially causing itch for the patient I did warn the patient of the possibility of urticaria with irbesartan however this is not expected. If symptoms develop she will phone the office.  - irbesartan (AVAPRO) 150 MG tablet; Take 1 tablet by mouth daily  Dispense: 90 tablet; Refill: 1  - Basic Metabolic Panel; Future  - CBC with Auto Differential; Future    4. Gastroesophageal reflux disease without esophagitis  Symptoms are stable continue observation      6. Chronic anticoagulation  No bleeding reported continue meds    7. Permanent atrial fibrillation (HCC)  Continue anticoagulation and beta-blocker        Return to Office: Return in about 3 months (around 6/28/2022) for AWV. I encourage further reading and education about your health conditions. Information on many healthconditions is provided by the American Academy of Family Physicians: https://familydoctor. org/  Please bring any questions to me at your next visit. This document may have been prepared at least partiallythrough the use of voice recognition software. Although effort is taken to assure the accuracy of this document, it is possible that grammatical, syntax,  or spelling errors may occur.     Medication List:    Current Outpatient Medications   Medication Sig Dispense Refill    irbesartan (AVAPRO) 150 MG tablet Take 1 tablet by mouth daily 90 tablet 1    metoprolol succinate (TOPROL XL) 50 MG extended release tablet TAKE 1 TABLET BY MOUTH TWICE A  tablet 1    vitamin B-12 (CYANOCOBALAMIN) 1000 MCG tablet Take 1,000 mcg by mouth daily      ELDERBERRY PO Take 100 mg by mouth daily      vitamin E 1000 units capsule Take 1,000 Units by mouth daily      apixaban (ELIQUIS) 5 MG TABS tablet Take 1 tablet by mouth 2 times daily 60 tablet 5    blood glucose monitor strips Test 2 times a day & as needed for symptoms of irregular blood glucose. Dispense sufficient amount for indicated testing frequency plus additional to accommodate PRN testing needs. 100 strip 5    FreeStyle Lancets MISC 1 each by Does not apply route 2 times daily 100 each 5    Alcohol Swabs PADS 1 each by Does not apply route 4 times daily 100 each 5    blood glucose monitor kit and supplies As per patients insurance coverage; please dispense sufficient amount for indicated testing 2 x daily plus additional to accommodate PRN testing needs. #90 day supply with one refill. Dispense all needed supplies to include: monitor, strips, lancing device, lancets, control solutions, alcohol swabs. 1 kit 0    Handicap Placard MISC by Does not apply route 1 each 0    vitamin C (ASCORBIC ACID) 500 MG tablet Take 1,000 mg by mouth 2 times daily      BETA CAROTENE PO Take 1 tablet by mouth daily      acetaminophen (APAP EXTRA STRENGTH) 500 MG tablet Take 1 tablet by mouth every 6 hours as needed for Pain 20 tablet 0    Coenzyme Q10 (COQ-10) 200 MG CAPS Take 1 capsule by mouth daily      CRANBERRY PO Take 2 tablets by mouth daily       Cinnamon 500 MG TABS Take 1 tablet by mouth daily       Multiple Vitamin (MULTIVITAMIN PO) Take 1 tablet by mouth daily        No current facility-administered medications for this visit.         Jocelynn Gardner MD

## 2022-03-31 ENCOUNTER — TELEPHONE (OUTPATIENT)
Dept: FAMILY MEDICINE CLINIC | Age: 82
End: 2022-03-31

## 2022-03-31 NOTE — TELEPHONE ENCOUNTER
----- Message from Tito Akins sent at 3/30/2022  4:45 PM EDT -----  Subject: Medication Problem    QUESTIONS  Name of Medication? irbesartan (AVAPRO) 150 MG tablet  Patient-reported dosage and instructions? 1 tab at night  What question or problem do you have with the medication? Pt is stating   she has a little itchy but not as much almost gone. States the BP bill is   working but she is wanting to know why is she so tired. Would also like to   know why she is on 150 Mg? Preferred Pharmacy? [id number] [address] [address], [place]. phone number (if available)? [phone]  Additional Information for Provider?   ---------------------------------------------------------------------------  --------------  [address]  What is the best way for the office to contact you? OK to leave message on   voicemail  Preferred Call Back Phone Number? [phone]  ---------------------------------------------------------------------------  --------------  SCRIPT ANSWERS  Relationship to Patient?  Self

## 2022-04-01 DIAGNOSIS — I48.91 ATRIAL FIBRILLATION, UNSPECIFIED TYPE (HCC): ICD-10-CM

## 2022-04-01 RX ORDER — APIXABAN 5 MG/1
TABLET, FILM COATED ORAL
Qty: 60 TABLET | Refills: 5 | Status: SHIPPED
Start: 2022-04-01 | End: 2022-09-27

## 2022-04-01 NOTE — TELEPHONE ENCOUNTER
150mg is the usual starting dose for this med for hypertension. We need to follow your blood pressures and adjust accordingly. Tired = please check BP and notify me what the numbers are next week, you might need a dose change. Itch - if nearly gone most likely unrelated or benign, I would not recommend any changes at this time for this symptom.

## 2022-05-10 ENCOUNTER — HOSPITAL ENCOUNTER (OUTPATIENT)
Age: 82
Discharge: HOME | End: 2022-05-10
Payer: MEDICARE

## 2022-05-10 DIAGNOSIS — I10 ESSENTIAL HYPERTENSION: ICD-10-CM

## 2022-05-10 LAB
ANION GAP SERPL CALCULATED.3IONS-SCNC: 9 MMOL/L (ref 7–16)
BASOPHILS ABSOLUTE: 0.07 E9/L (ref 0–0.2)
BASOPHILS RELATIVE PERCENT: 0.9 % (ref 0–2)
BUN BLDV-MCNC: 11 MG/DL (ref 6–23)
CALCIUM SERPL-MCNC: 9.1 MG/DL (ref 8.6–10.2)
CHLORIDE BLD-SCNC: 105 MMOL/L (ref 98–107)
CO2: 23 MMOL/L (ref 22–29)
CREAT SERPL-MCNC: 0.7 MG/DL (ref 0.5–1)
EOSINOPHILS ABSOLUTE: 0.78 E9/L (ref 0.05–0.5)
EOSINOPHILS RELATIVE PERCENT: 10.3 % (ref 0–6)
GFR AFRICAN AMERICAN: >60
GFR NON-AFRICAN AMERICAN: >60 ML/MIN/1.73
GLUCOSE BLD-MCNC: 162 MG/DL (ref 74–99)
HCT VFR BLD CALC: 42.8 % (ref 34–48)
HEMOGLOBIN: 14.2 G/DL (ref 11.5–15.5)
IMMATURE GRANULOCYTES #: 0.01 E9/L
IMMATURE GRANULOCYTES %: 0.1 % (ref 0–5)
LYMPHOCYTES ABSOLUTE: 1.69 E9/L (ref 1.5–4)
LYMPHOCYTES RELATIVE PERCENT: 22.2 % (ref 20–42)
MCH RBC QN AUTO: 32.9 PG (ref 26–35)
MCHC RBC AUTO-ENTMCNC: 33.2 % (ref 32–34.5)
MCV RBC AUTO: 99.3 FL (ref 80–99.9)
MONOCYTES ABSOLUTE: 0.82 E9/L (ref 0.1–0.95)
MONOCYTES RELATIVE PERCENT: 10.8 % (ref 2–12)
NEUTROPHILS ABSOLUTE: 4.23 E9/L (ref 1.8–7.3)
NEUTROPHILS RELATIVE PERCENT: 55.7 % (ref 43–80)
PDW BLD-RTO: 13.5 FL (ref 11.5–15)
PLATELET # BLD: 214 E9/L (ref 130–450)
PMV BLD AUTO: 10.6 FL (ref 7–12)
POTASSIUM SERPL-SCNC: 4.4 MMOL/L (ref 3.5–5)
RBC # BLD: 4.31 E12/L (ref 3.5–5.5)
SODIUM BLD-SCNC: 137 MMOL/L (ref 132–146)
WBC # BLD: 7.6 E9/L (ref 4.5–11.5)

## 2022-05-10 PROCEDURE — 85025 COMPLETE CBC W/AUTO DIFF WBC: CPT

## 2022-05-10 PROCEDURE — 36415 COLL VENOUS BLD VENIPUNCTURE: CPT

## 2022-05-10 PROCEDURE — 80048 BASIC METABOLIC PNL TOTAL CA: CPT

## 2022-05-11 NOTE — RESULT ENCOUNTER NOTE
Labs/message reviewed for physician that I am covering. Labs appear stable.   Keep follow up with the PCP

## 2022-06-01 ENCOUNTER — TELEPHONE (OUTPATIENT)
Dept: FAMILY MEDICINE CLINIC | Age: 82
End: 2022-06-01

## 2022-06-01 NOTE — TELEPHONE ENCOUNTER
Patient called requesting her appt be moved up sooner. She feels that her BP medication may be too strong for her. She said she feels very weak lately.  She is scheduled for 6/20/2022

## 2022-06-01 NOTE — TELEPHONE ENCOUNTER
----- Message from Nava Bocanegra sent at 6/1/2022 10:27 AM EDT -----  Subject: Message to Provider    QUESTIONS  Information for Provider? Pt also says that her BP pill may be to strong   for her As well.   ---------------------------------------------------------------------------  --------------  CALL BACK INFO  What is the best way for the office to contact you? OK to leave message on   voicemail  Preferred Call Back Phone Number? [phone]  ---------------------------------------------------------------------------  --------------  SCRIPT ANSWERS  Relationship to Patient?  Self

## 2022-06-02 ENCOUNTER — OFFICE VISIT (OUTPATIENT)
Dept: FAMILY MEDICINE CLINIC | Age: 82
End: 2022-06-02
Payer: MEDICARE

## 2022-06-02 VITALS
WEIGHT: 195 LBS | DIASTOLIC BLOOD PRESSURE: 102 MMHG | HEIGHT: 64 IN | BODY MASS INDEX: 33.29 KG/M2 | OXYGEN SATURATION: 97 % | SYSTOLIC BLOOD PRESSURE: 152 MMHG | HEART RATE: 76 BPM | RESPIRATION RATE: 19 BRPM | TEMPERATURE: 97.8 F

## 2022-06-02 DIAGNOSIS — I10 ESSENTIAL HYPERTENSION: Primary | ICD-10-CM

## 2022-06-02 PROCEDURE — 99213 OFFICE O/P EST LOW 20 MIN: CPT | Performed by: FAMILY MEDICINE

## 2022-06-02 PROCEDURE — G8417 CALC BMI ABV UP PARAM F/U: HCPCS | Performed by: FAMILY MEDICINE

## 2022-06-02 PROCEDURE — 1123F ACP DISCUSS/DSCN MKR DOCD: CPT | Performed by: FAMILY MEDICINE

## 2022-06-02 PROCEDURE — 1090F PRES/ABSN URINE INCON ASSESS: CPT | Performed by: FAMILY MEDICINE

## 2022-06-02 PROCEDURE — 1036F TOBACCO NON-USER: CPT | Performed by: FAMILY MEDICINE

## 2022-06-02 PROCEDURE — G8399 PT W/DXA RESULTS DOCUMENT: HCPCS | Performed by: FAMILY MEDICINE

## 2022-06-02 PROCEDURE — G8427 DOCREV CUR MEDS BY ELIG CLIN: HCPCS | Performed by: FAMILY MEDICINE

## 2022-06-02 RX ORDER — NYSTATIN 100000 [USP'U]/G
POWDER TOPICAL
Qty: 60 G | Refills: 1 | Status: SHIPPED | OUTPATIENT
Start: 2022-06-02 | End: 2022-06-03 | Stop reason: SDUPTHER

## 2022-06-02 RX ORDER — CHLORTHALIDONE 25 MG/1
25 TABLET ORAL DAILY
Qty: 30 TABLET | Refills: 0 | Status: SHIPPED
Start: 2022-06-02 | End: 2022-06-20 | Stop reason: SDUPTHER

## 2022-06-02 ASSESSMENT — PATIENT HEALTH QUESTIONNAIRE - PHQ9
SUM OF ALL RESPONSES TO PHQ QUESTIONS 1-9: 0
SUM OF ALL RESPONSES TO PHQ QUESTIONS 1-9: 0
1. LITTLE INTEREST OR PLEASURE IN DOING THINGS: 0
SUM OF ALL RESPONSES TO PHQ9 QUESTIONS 1 & 2: 0
SUM OF ALL RESPONSES TO PHQ QUESTIONS 1-9: 0
2. FEELING DOWN, DEPRESSED OR HOPELESS: 0
SUM OF ALL RESPONSES TO PHQ QUESTIONS 1-9: 0

## 2022-06-02 NOTE — PROGRESS NOTES
[facility]  Family Medicine Residency  Phone: [phone]  Fax: [phone]    Patient:  Phyllis Dockery 81 y.o. female                                 Date of Service: 6/2/22                            Chiefcomplaint:   Chief Complaint   Patient presents with    Hypertension     Pt states she is not short of breath but gets \"winded\" quickly lately. This has been for about a month and a half.  Fatigue     About one and a half months. Very tired all day no matter the activity level. History of Present Illness: The patient is a 81 y.o. female  presented to the clinic with complaints as above. Hypertension-patient states she is having some dyspnea on exertion she describes it as getting winded quickly she is overall been feeling fatigued and presents for evaluation there is no complaints of chest pain there is no flutters that are abnormal there is no lightheadedness or dizziness reported. He has not been following her blood pressures at home however in the office today she is quite elevated. Patient struggles with a rash beneath the breasts and the summer when it is hot no symptoms currently. Review of Systems:   Review of Systems   Constitutional: Positive for fatigue. Respiratory: Positive for shortness of breath. Negative for chest tightness. Cardiovascular: Negative for chest pain and palpitations. Gastrointestinal: Negative for abdominal pain.        Past Medical History:      Diagnosis Date    Arthritis     Bilateral carpal tunnel syndrome     right more than left    Diabetes mellitus ([address]     diet controlled & cinnamon    DM2 (diabetes mellitus, type 2) ([address] 2/16/2012    patient states not diabetic, family hx of diabetes; A1C-6.2    Endometrial cancer ([address] 1992    early small cured    GERD (gastroesophageal reflux disease)     HTN (hypertension) 2/16/2012    Hyperlipidemia     borderline- no meds    Hypertension     controlled  Obesity 5/28/2013    Osteoarthritis     Osteoarthritis of right knee     [place]    Sinus drainage     seasonal (winter)    Spondylosis of lumbosacral joint     Voice absence     only with anxiety       Past Surgical History:        Procedure Laterality Date    CATARACT REMOVAL  2022    COLONOSCOPY  [address]    1 hyperplastic polyp 2mm    HYSTERECTOMY  1990's    [facility] BSO    JOINT REPLACEMENT Right     Knee    OTHER SURGICAL HISTORY  03/11/2015    EGD with Biopsy    TONSILLECTOMY      age 13   [employer] [facility] TOTAL ABDOMINAL HYSTERECTOMY W/ BILATERAL SALPINGOOPHORECTOMY      1990's [facility]A and BSO    TOTAL KNEE ARTHROPLASTY  02/2012    right    UPPER GASTROINTESTINAL ENDOSCOPY  2008       Allergies:    Penicillins, Aspirin, Losartan potassium-hctz, Omeprazole, Protonix [pantoprazole], and Cyclobenzaprine    Social History:   Social History     Socioeconomic History    Marital status:      Spouse name: Not on file    Number of children: 0    Years of education: 6    Highest education level: Associate degree: occupational, technical, or vocational program   Occupational History    Occupation: beautician   Tobacco Use    Smoking status: Never Smoker    Smokeless tobacco: Never Used   Vaping Use    Vaping Use: Never used   Substance and Sexual Activity    Alcohol use: No    Drug use: No    Sexual activity: Not Currently   Other Topics Concern    Not on file   Social History Narrative    Not on file     Social Determinants of Health     Financial Resource Strain: Low Risk     Difficulty of Paying Living Expenses: Not hard at all   Food Insecurity: No Food Insecurity    Worried About [address] in the Last Year: Never true    [facility] in the Last Year: Never true   Transportation Needs:     Lack of Transportation (Medical): Not on file    Lack of Transportation (Non-Medical):  Not on file   Physical Activity:     Days of Exercise per Week: Not on file    Minutes of Exercise per Session: Not on file   Stress:     Feeling of Stress : Not on file   Social Connections:     Frequency of Communication with Friends and Family: Not on file    Frequency of Social Gatherings with Friends and Family: Not on file    Attends Jainism Services: Not on file    Active Member of [address] [place] [address] or Organizations: Not on file    Attends Club or Organization Meetings: Not on file    Marital Status: Not on file   Intimate Partner Violence:     Fear of Current or Ex-Partner: Not on file    Emotionally Abused: Not on file    Physically Abused: Not on file    Sexually Abused: Not on file   Housing Stability:     Unable to Pay for Housing in the Last Year: Not on file    Number of [place] in the Last Year: Not on file    Unstable Housing in the Last Year: Not on file        Family History:       Problem Relation Age of Onset    Diabetes Mother     Heart Disease Brother     Heart Disease Father        Physical Exam:    Vitals: BP (!) 152/102   Pulse 76   Temp 97.8 °F (36.6 °C)   Resp 19   Ht 5' 4\" (1.626 m)   Wt 195 lb (88.5 kg)   SpO2 97%   BMI 33.47 kg/m²   BP Readings from Last 3 Encounters:   06/02/22 (!) 152/102   03/28/22 (!) 138/92   11/18/21 (!) 155/80     General Appearance: Well developed, awake, alert, oriented, no acute distress  HEENT: Normocephalic,atraumatic. PERRL, EOM's intact, EAC  Chest wall/Lung: Clear to auscultation bilaterally,  respirations unlabored. No ronchi/wheezing/rales  Heart[de-identified]  Regular rate and rhythm, S1and S2 normal, no murmur, rub or gallop. Extremities:  Extremities normal, atraumatic, no cyanosis. +2 edema. Neurologic:   Alert&Oriented. Normal gait and coordination  No focal neurological deficits appreaciated         Psychiatric: has a normal mood and affect. Behavior is normal.       Assessment and Plan:         1. Essential hypertension  Add chlorthalidone check BMP in approximately 1 to 2 weeks recheck blood pressure in approximately 2 weeks.   - Basic Metabolic Panel; Future        Return to Office: Return in about 2 weeks (around 6/16/2022) for keep appt in 2 weeks. I encourage further reading and education about your health conditions. Information on many healthconditions is provided by the American Academy of Family [employer]icians: https://familydoctor. org/  Please bring any questions to me at your next visit. This document may have been prepared at least partiallythrough the use of voice recognition software. Although effort is taken to assure the accuracy of this document, it is possible that grammatical, syntax,  or spelling errors may occur. Medication List:    Current Outpatient Medications   Medication Sig Dispense Refill    nystatin (MYCOSTATIN) 970250 UNIT/GM powder Apply 3 times daily for a week 60 g 1    chlorthalidone (HYGROTON) 25 MG tablet Take 1 tablet by mouth daily 30 tablet 0    ELIQUIS 5 MG TABS tablet TAKE 1 TABLET BY MOUTH TWICE A DAY 60 tablet 5    irbesartan (AVAPRO) 150 MG tablet Take 1 tablet by mouth daily 90 tablet 1    metoprolol succinate (TOPROL XL) 50 MG extended release tablet TAKE 1 TABLET BY MOUTH TWICE A  tablet 1    vitamin B-12 (CYANOCOBALAMIN) 1000 MCG tablet Take 1,000 mcg by mouth daily      ELDERBERRY PO Take 100 mg by mouth daily      vitamin E 1000 units capsule Take 1,000 Units by mouth daily      blood glucose monitor strips Test 2 times a day & as needed for symptoms of irregular blood glucose. Dispense sufficient amount for indicated testing frequency plus additional to accommodate PRN testing needs. 100 strip 5    FreeStyle Lancets MISC 1 each by Does not apply route 2 times daily 100 each 5    Alcohol Swabs PADS 1 each by Does not apply route 4 times daily 100 each 5    blood glucose monitor kit and supplies As per patients insurance coverage; please dispense sufficient amount for indicated testing 2 x daily plus additional to accommodate PRN testing needs.  #90 day supply with one refill. Dispense all needed supplies to include: monitor, strips, lancing device, lancets, control solutions, alcohol swabs. 1 kit 0    Handicap Placard MISC by Does not apply route 1 each 0    vitamin C (ASCORBIC ACID) 500 MG tablet Take 1,000 mg by mouth 2 times daily      BETA CAROTENE PO Take 1 tablet by mouth daily      acetaminophen (APAP EXTRA STRENGTH) 500 MG tablet Take 1 tablet by mouth every 6 hours as needed for Pain 20 tablet 0    Coenzyme Q10 (COQ-10) 200 MG CAPS Take 1 capsule by mouth daily      CRANBERRY PO Take 2 tablets by mouth daily       Cinnamon 500 MG TABS Take 1 tablet by mouth daily       Multiple Vitamin (MULTIVITAMIN PO) Take 1 tablet by mouth daily        No current facility-administered medications for this visit.         Mireya Arias MD

## 2022-06-03 RX ORDER — NYSTATIN 100000 [USP'U]/G
POWDER TOPICAL
Qty: 60 G | Refills: 1 | Status: SHIPPED | OUTPATIENT
Start: 2022-06-03

## 2022-06-13 ASSESSMENT — ENCOUNTER SYMPTOMS
CHEST TIGHTNESS: 0
ABDOMINAL PAIN: 0
SHORTNESS OF BREATH: 1

## 2022-06-20 ENCOUNTER — HOSPITAL ENCOUNTER (OUTPATIENT)
Age: 82
Discharge: HOME | End: 2022-06-20
Payer: MEDICARE

## 2022-06-20 ENCOUNTER — OFFICE VISIT (OUTPATIENT)
Dept: FAMILY MEDICINE CLINIC | Age: 82
End: 2022-06-20
Payer: MEDICARE

## 2022-06-20 VITALS
HEART RATE: 79 BPM | DIASTOLIC BLOOD PRESSURE: 72 MMHG | HEIGHT: 64 IN | OXYGEN SATURATION: 97 % | SYSTOLIC BLOOD PRESSURE: 120 MMHG | RESPIRATION RATE: 18 BRPM | TEMPERATURE: 97.8 F | BODY MASS INDEX: 33.63 KG/M2 | WEIGHT: 197 LBS

## 2022-06-20 DIAGNOSIS — I10 ESSENTIAL HYPERTENSION: ICD-10-CM

## 2022-06-20 DIAGNOSIS — R29.898 MUSCULAR DECONDITIONING: ICD-10-CM

## 2022-06-20 DIAGNOSIS — I48.91 ATRIAL FIBRILLATION, UNSPECIFIED TYPE (HCC): ICD-10-CM

## 2022-06-20 DIAGNOSIS — Z00.00 MEDICARE ANNUAL WELLNESS VISIT, SUBSEQUENT: Primary | ICD-10-CM

## 2022-06-20 LAB
ALBUMIN SERPL-MCNC: 3.9 G/DL (ref 3.5–5.2)
ALP BLD-CCNC: 139 U/L (ref 35–104)
ALT SERPL-CCNC: 19 U/L (ref 0–32)
ANION GAP SERPL CALCULATED.3IONS-SCNC: 12 MMOL/L (ref 7–16)
AST SERPL-CCNC: 27 U/L (ref 0–31)
BILIRUB SERPL-MCNC: 0.5 MG/DL (ref 0–1.2)
BUN BLDV-MCNC: 12 MG/DL (ref 6–23)
CALCIUM SERPL-MCNC: 8.9 MG/DL (ref 8.6–10.2)
CHLORIDE BLD-SCNC: 96 MMOL/L (ref 98–107)
CO2: 23 MMOL/L (ref 22–29)
CREAT SERPL-MCNC: 0.6 MG/DL (ref 0.5–1)
GFR AFRICAN AMERICAN: >60
GFR NON-AFRICAN AMERICAN: >60 ML/MIN/1.73
GLUCOSE BLD-MCNC: 116 MG/DL (ref 74–99)
POTASSIUM SERPL-SCNC: 4.2 MMOL/L (ref 3.5–5)
SODIUM BLD-SCNC: 131 MMOL/L (ref 132–146)
TOTAL PROTEIN: 7.8 G/DL (ref 6.4–8.3)

## 2022-06-20 PROCEDURE — G0439 PPPS, SUBSEQ VISIT: HCPCS | Performed by: FAMILY MEDICINE

## 2022-06-20 PROCEDURE — 1123F ACP DISCUSS/DSCN MKR DOCD: CPT | Performed by: FAMILY MEDICINE

## 2022-06-20 PROCEDURE — 80053 COMPREHEN METABOLIC PANEL: CPT

## 2022-06-20 PROCEDURE — 36415 COLL VENOUS BLD VENIPUNCTURE: CPT

## 2022-06-20 RX ORDER — IRBESARTAN 150 MG/1
150 TABLET ORAL DAILY
Qty: 90 TABLET | Refills: 2 | Status: SHIPPED | OUTPATIENT
Start: 2022-06-20

## 2022-06-20 RX ORDER — CHLORTHALIDONE 25 MG/1
25 TABLET ORAL DAILY
Qty: 30 TABLET | Refills: 2 | Status: SHIPPED
Start: 2022-06-20 | End: 2022-08-10 | Stop reason: SDUPTHER

## 2022-06-20 RX ORDER — METOPROLOL SUCCINATE 50 MG/1
50 TABLET, EXTENDED RELEASE ORAL DAILY
Qty: 180 TABLET | Refills: 2 | Status: SHIPPED
Start: 2022-06-20 | End: 2022-08-10

## 2022-06-20 ASSESSMENT — PATIENT HEALTH QUESTIONNAIRE - PHQ9
SUM OF ALL RESPONSES TO PHQ QUESTIONS 1-9: 0
SUM OF ALL RESPONSES TO PHQ9 QUESTIONS 1 & 2: 0
1. LITTLE INTEREST OR PLEASURE IN DOING THINGS: 0
2. FEELING DOWN, DEPRESSED OR HOPELESS: 0
SUM OF ALL RESPONSES TO PHQ QUESTIONS 1-9: 0

## 2022-06-20 ASSESSMENT — LIFESTYLE VARIABLES: HOW OFTEN DO YOU HAVE A DRINK CONTAINING ALCOHOL: NEVER

## 2022-06-20 NOTE — PROGRESS NOTES
Medicare Annual Wellness Visit    Khloe Yan is here for Medicare AWV (Pt wants to know if she should get a covid booster. )    Assessment & Plan   Medicare annual wellness visit, subsequent      Recommendations for Preventive Services Due: see orders and patient instructions/AVS.  Recommended screening schedule for the next 5-10 years is provided to the patient in written form: see Patient Instructions/AVS.     Return for Medicare Annual Wellness Visit in 1 year. Subjective   The following acute and/or chronic problems were also addressed today:  HTN - feels better - decreased fatigue. No CP/palpitation/HA/lightheadedness. She continues to work. She overall is doing better. Gait instability - she is aware she is getting more unstable on her feet. She is interested in seeing PT/OT for this. She has walkers at home. Bathtub is walk in. She may need shower handles. Patient's complete Health Risk Assessment and screening values have been reviewed and are found in Flowsheets. The following problems were reviewed today and where indicated follow up appointments were made and/or referrals ordered. Positive Risk Factor Screenings with Interventions:    Fall Risk:  Do you feel unsteady or are you worried about falling? : (!) yes  2 or more falls in past year?: no  Fall with injury in past year?: no     Fall Risk Interventions:    · wishes to see PT for gait training and stregthening. Health Habits/Nutrition:     Physical Activity: Insufficiently Active    Days of Exercise per Week: 7 days    Minutes of Exercise per Session: 10 min     Have you lost any weight without trying in the past 3 months?: No  Body mass index: (!) 33.81  Have you seen the dentist within the past year?: Yes    Health Habits/Nutrition Interventions:  · discussed nutrition and weight.               Objective   Vitals:    06/20/22 1039   BP: 120/72   Pulse: 79   Resp: 18   Temp: 97.8 °F (36.6 °C)   SpO2: 97% Weight: 197 lb (89.4 kg)   Height: 5' 4\" (1.626 m)      Body mass index is 33.81 kg/m². General Appearance: alert and oriented to person, place and time, well-developed and well-nourished, in no acute distress  Pulmonary/Chest: clear to auscultation bilaterally- no wheezes, rales or rhonchi, normal air movement, no respiratory distress  Cardiovascular: normal rate, normal S1 and S2 and no gallops  Extremities: no cyanosis and no clubbing +1 edema b/l   MSK: Gait normal, slightly slow. Allergies   Allergen Reactions    Penicillins Anaphylaxis    Aspirin Other (See Comments)     Severe nose bleed    Losartan Potassium-Hctz     Omeprazole     Protonix [Pantoprazole]      Neck muscle spasm    Cyclobenzaprine      Description sounds like muscle spasm worsening rather than SE to med     Prior to Visit Medications    Medication Sig Taking? Authorizing Provider   nystatin (MYCOSTATIN) 945483 UNIT/GM powder Apply 3 times daily for a week  Inder Mota MD   chlorthalidone (HYGROTON) 25 MG tablet Take 1 tablet by mouth daily  Inder Mota MD   ELIQUIS 5 MG TABS tablet TAKE 1 TABLET BY MOUTH TWICE A DAY  Inder Mota MD   irbesartan (AVAPRO) 150 MG tablet Take 1 tablet by mouth daily  Inder Mota MD   metoprolol succinate (TOPROL XL) 50 MG extended release tablet TAKE 1 TABLET BY MOUTH TWICE A DAY  Inder Mota MD   vitamin B-12 (CYANOCOBALAMIN) 1000 MCG tablet Take 1,000 mcg by mouth daily  Historical Provider, MD   ELDERBERRY PO Take 100 mg by mouth daily  Historical Provider, MD   vitamin E 1000 units capsule Take 1,000 Units by mouth daily  Historical Provider, MD   blood glucose monitor strips Test 2 times a day & as needed for symptoms of irregular blood glucose. Dispense sufficient amount for indicated testing frequency plus additional to accommodate PRN testing needs.   Inder Mota MD   FreeStyle Lancets MISC 1 each by Does not apply route 2 times daily Maci Nelson MD   Alcohol Swabs PADS 1 each by Does not apply route 4 times daily  Maci Nelson MD   blood glucose monitor kit and supplies As per patients insurance coverage; please dispense sufficient amount for indicated testing 2 x daily plus additional to accommodate PRN testing needs. #90 day supply with one refill. Dispense all needed supplies to include: monitor, strips, lancing device, lancets, control solutions, alcohol swabs.   Maci Nelson MD   Handicap Placard MISC by Does not apply route  Maci Nelson MD   vitamin C (ASCORBIC ACID) 500 MG tablet Take 1,000 mg by mouth 2 times daily  Historical Provider, MD   BETA CAROTENE PO Take 1 tablet by mouth daily  Historical Provider, MD   acetaminophen (APAP EXTRA STRENGTH) 500 MG tablet Take 1 tablet by mouth every 6 hours as needed for Pain  BENIGNO Antunez - CNP   Coenzyme Q10 (COQ-10) 200 MG CAPS Take 1 capsule by mouth daily  Historical Provider, MD   CRANBERRY PO Take 2 tablets by mouth daily   Historical Provider, MD   Cinnamon 500 MG TABS Take 1 tablet by mouth daily   Historical Provider, MD   Multiple Vitamin (MULTIVITAMIN PO) Take 1 tablet by mouth daily   Historical Provider, MD [place] (Including outside providers/suppliers regularly involved in providing care):   Patient Care Team:  Maci Nelson MD as PCP - General (Family Medicine)  Maci Nelson MD as PCP - [facility] Provider  Rosaline Osler, MD as Consulting Physician (Cardiology)     Reviewed and updated this visit:  Tobacco  Allergies  Meds  Med Hx  Surg Hx  Soc Hx  Fam Hx

## 2022-06-20 NOTE — PATIENT INSTRUCTIONS
Personalized Preventive Plan for John Manuel - 6/20/2022  Medicare offers a range of preventive health benefits. Some of the tests and screenings are paid in full while other may be subject to a deductible, co-insurance, and/or copay. Some of these benefits include a comprehensive review of your medical history including lifestyle, illnesses that may run in your family, and various assessments and screenings as appropriate. After reviewing your medical record and screening and assessments performed today your provider may have ordered immunizations, labs, imaging, and/or referrals for you. A list of these orders (if applicable) as well as your Preventive Care list are included within your After Visit Summary for your review. Other Preventive Recommendations:    · A preventive eye exam performed by an eye specialist is recommended every 1-2 years to screen for glaucoma; cataracts, macular degeneration, and other eye disorders. · A preventive dental visit is recommended every 6 months. · Try to get at least 150 minutes of exercise per week or 10,000 steps per day on a pedometer . · Order or download the FREE \"Exercise & Physical Activity: Your Everyday Guide\" from [employer]. Call 5-528.885.2295 or search [employer] online. · You need 9789-7697 mg of calcium and 7922-5075 IU of vitamin D per day. It is possible to meet your calcium requirement with diet alone, but a vitamin D supplement is usually necessary to meet this goal.  · When exposed to the sun, use a sunscreen that protects against both UVA and UVB radiation with an SPF of 30 or greater. Reapply every 2 to 3 hours or after sweating, drying off with a towel, or swimming. · Always wear a seat belt when traveling in a car. Always wear a helmet when riding a bicycle or motorcycle.

## 2022-08-06 DIAGNOSIS — I48.91 ATRIAL FIBRILLATION, UNSPECIFIED TYPE (HCC): ICD-10-CM

## 2022-08-06 DIAGNOSIS — I10 ESSENTIAL HYPERTENSION: ICD-10-CM

## 2022-08-10 RX ORDER — CHLORTHALIDONE 25 MG/1
TABLET ORAL
Qty: 90 TABLET | OUTPATIENT
Start: 2022-08-10

## 2022-08-10 RX ORDER — METOPROLOL SUCCINATE 50 MG/1
TABLET, EXTENDED RELEASE ORAL
Qty: 180 TABLET | Refills: 1 | Status: SHIPPED | OUTPATIENT
Start: 2022-08-10

## 2022-08-10 RX ORDER — CHLORTHALIDONE 25 MG/1
25 TABLET ORAL DAILY
Qty: 30 TABLET | Refills: 2 | Status: SHIPPED
Start: 2022-08-10 | End: 2022-10-03 | Stop reason: SDUPTHER

## 2022-09-25 DIAGNOSIS — I48.91 ATRIAL FIBRILLATION, UNSPECIFIED TYPE (HCC): ICD-10-CM

## 2022-09-27 RX ORDER — APIXABAN 5 MG/1
TABLET, FILM COATED ORAL
Qty: 60 TABLET | Refills: 3 | Status: SHIPPED | OUTPATIENT
Start: 2022-09-27

## 2022-09-28 ENCOUNTER — TELEPHONE (OUTPATIENT)
Dept: FAMILY MEDICINE CLINIC | Age: 82
End: 2022-09-28

## 2022-09-28 NOTE — TELEPHONE ENCOUNTER
Patient was transferred in from [facility] ([facility]) as an urgent visit due to ankle swelling. Advised patient that Dr Kannan Painter did not have any openings this week that I can put her in with a resident. She did not want to do that, stated she has an appointment with her cardiologist coming up, she will see them.

## 2022-10-02 DIAGNOSIS — I10 ESSENTIAL HYPERTENSION: ICD-10-CM

## 2022-10-03 ENCOUNTER — TELEPHONE (OUTPATIENT)
Dept: FAMILY MEDICINE CLINIC | Age: 82
End: 2022-10-03

## 2022-10-03 RX ORDER — CHLORTHALIDONE 25 MG/1
TABLET ORAL
Qty: 90 TABLET | OUTPATIENT
Start: 2022-10-03

## 2022-10-03 RX ORDER — CHLORTHALIDONE 25 MG/1
25 TABLET ORAL DAILY
Qty: 90 TABLET | Refills: 0 | Status: SHIPPED | OUTPATIENT
Start: 2022-10-03

## 2022-10-03 NOTE — TELEPHONE ENCOUNTER
Received a call from the patient that the pharmacy did not fill the metoprolol that was sent in August.     Called the pharmacy, they stated that the patient picked up #90 on 8/6/2022. I explained that patient is to be taking that twice daily, so when they gave her #90, that was only 45 days worth.      I phoned in the script from 8/10/2022    Metoprolol Succinate 50mg ER tablet #180   Take one tablet twice daily

## 2022-11-01 ENCOUNTER — OFFICE VISIT (OUTPATIENT)
Dept: CARDIOLOGY CLINIC | Age: 82
End: 2022-11-01
Payer: MEDICARE

## 2022-11-01 VITALS
SYSTOLIC BLOOD PRESSURE: 112 MMHG | DIASTOLIC BLOOD PRESSURE: 66 MMHG | RESPIRATION RATE: 16 BRPM | WEIGHT: 195.5 LBS | HEIGHT: 63 IN | BODY MASS INDEX: 34.64 KG/M2 | HEART RATE: 72 BPM

## 2022-11-01 DIAGNOSIS — I10 ESSENTIAL HYPERTENSION: ICD-10-CM

## 2022-11-01 DIAGNOSIS — Z79.01 CHRONIC ANTICOAGULATION: ICD-10-CM

## 2022-11-01 DIAGNOSIS — I48.21 PERMANENT ATRIAL FIBRILLATION (HCC): Primary | ICD-10-CM

## 2022-11-01 PROCEDURE — 99213 OFFICE O/P EST LOW 20 MIN: CPT | Performed by: INTERNAL MEDICINE

## 2022-11-01 PROCEDURE — G8399 PT W/DXA RESULTS DOCUMENT: HCPCS | Performed by: INTERNAL MEDICINE

## 2022-11-01 PROCEDURE — 1090F PRES/ABSN URINE INCON ASSESS: CPT | Performed by: INTERNAL MEDICINE

## 2022-11-01 PROCEDURE — 3074F SYST BP LT 130 MM HG: CPT | Performed by: INTERNAL MEDICINE

## 2022-11-01 PROCEDURE — 1036F TOBACCO NON-USER: CPT | Performed by: INTERNAL MEDICINE

## 2022-11-01 PROCEDURE — 3078F DIAST BP <80 MM HG: CPT | Performed by: INTERNAL MEDICINE

## 2022-11-01 PROCEDURE — G8484 FLU IMMUNIZE NO ADMIN: HCPCS | Performed by: INTERNAL MEDICINE

## 2022-11-01 PROCEDURE — 93000 ELECTROCARDIOGRAM COMPLETE: CPT | Performed by: INTERNAL MEDICINE

## 2022-11-01 PROCEDURE — G8417 CALC BMI ABV UP PARAM F/U: HCPCS | Performed by: INTERNAL MEDICINE

## 2022-11-01 PROCEDURE — G8427 DOCREV CUR MEDS BY ELIG CLIN: HCPCS | Performed by: INTERNAL MEDICINE

## 2022-11-01 PROCEDURE — 1123F ACP DISCUSS/DSCN MKR DOCD: CPT | Performed by: INTERNAL MEDICINE

## 2022-11-01 NOTE — PROGRESS NOTES
Patient Active Problem List   Diagnosis    Osteoarthritis of right knee    GERD (gastroesophageal reflux disease)    Spondylosis of lumbosacral joint    Obesity    Essential hypertension    Mixed hyperlipidemia    Type 2 diabetes mellitus without complication (HCC)    Permanent atrial fibrillation (HCC)    Chronic anticoagulation       Current Outpatient Medications   Medication Sig Dispense Refill    chlorthalidone (HYGROTON) 25 MG tablet Take 1 tablet by mouth daily 90 tablet 0    apixaban (ELIQUIS) 5 MG TABS tablet TAKE 1 TABLET BY MOUTH TWICE A DAY 60 tablet 3    metoprolol succinate (TOPROL XL) 50 MG extended release tablet TAKE 1 TABLET BY MOUTH TWICE A  tablet 1    irbesartan (AVAPRO) 150 MG tablet Take 1 tablet by mouth daily 90 tablet 2    ELDERBERRY PO Take 100 mg by mouth daily      blood glucose monitor strips Test 2 times a day & as needed for symptoms of irregular blood glucose. Dispense sufficient amount for indicated testing frequency plus additional to accommodate PRN testing needs. 100 strip 5    FreeStyle Lancets MISC 1 each by Does not apply route 2 times daily 100 each 5    Alcohol Swabs PADS 1 each by Does not apply route 4 times daily 100 each 5    blood glucose monitor kit and supplies As per patients insurance coverage; please dispense sufficient amount for indicated testing 2 x daily plus additional to accommodate PRN testing needs. #90 day supply with one refill. Dispense all needed supplies to include: monitor, strips, lancing device, lancets, control solutions, alcohol swabs.  1 kit 0    Handicap Placard MISC by Does not apply route 1 each 0    vitamin C (ASCORBIC ACID) 500 MG tablet Take 1,000 mg by mouth 2 times daily      BETA CAROTENE PO Take 1 tablet by mouth daily      acetaminophen (APAP EXTRA STRENGTH) 500 MG tablet Take 1 tablet by mouth every 6 hours as needed for Pain 20 tablet 0    CRANBERRY PO Take 2 tablets by mouth daily       Cinnamon 500 MG TABS Take 1 tablet by mouth daily       Multiple Vitamin (MULTIVITAMIN PO) Take 1 tablet by mouth daily       nystatin (MYCOSTATIN) 621997 UNIT/GM powder Apply 3 times daily for a week (Patient not taking: Reported on 11/1/2022) 60 g 1    vitamin B-12 (CYANOCOBALAMIN) 1000 MCG tablet Take 1,000 mcg by mouth daily      Coenzyme Q10 (COQ-10) 200 MG CAPS Take 1 capsule by mouth daily (Patient not taking: Reported on 11/1/2022)       No current facility-administered medications for this visit. CC:    Patient is seen in follow up for:  1. Permanent atrial fibrillation ([address]    2. Essential hypertension    3. Chronic anticoagulation        HPI:    Patient denies any shortness of breath, chest pain, lightheadedness or dizziness. Already medications well without side effects. ROS:   General: No unusual weight gain, no change in exercise tolerance  Skin: No rash or itching  EENT: No vision changes or nosebleeds  Cardiovascular: No orthopnea or paroxysmal nocturnal dyspnea  Respiratory: No cough or hemoptysis  Gastrointestinal: No hematemesis or recent changes in bowel habits  Genitourinary: No hematuria, urgency or frequency  Musculoskeletal: No muscular weakness or joint swelling   Neurologic / Psychiatric: No incoordination or convulsions  Allergic / Immunologic/ Lymphatic / Endocrine: No anemia or bleeding tendency    Social History     Socioeconomic History    Marital status:       Spouse name: Not on file    Number of children: 0    Years of education: 6    Highest education level: Associate degree: occupational, technical, or vocational program   Occupational History    Occupation: beautician   Tobacco Use    Smoking status: Never    Smokeless tobacco: Never   Vaping Use    Vaping Use: Never used   Substance and Sexual Activity    Alcohol use: No    Drug use: No    Sexual activity: Not Currently   Other Topics Concern    Not on file   Social History Narrative    Not on file     Social Determinants of Health     Financial Resource Strain: Low Risk     Difficulty of Paying Living Expenses: Not hard at all   Food Insecurity: No Food Insecurity    Worried About Running Out of Food in the Last Year: Never true    Ran Out of Food in the Last Year: Never true   Transportation Needs: Not on file   Physical Activity: Insufficiently Active    Days of Exercise per Week: 7 days    Minutes of Exercise per Session: 10 min   Stress: Not on file   Social Connections: Not on file   Intimate Partner Violence: Not on file   Housing Stability: Not on file       Family History   Problem Relation Age of Onset    Diabetes Mother     Heart Disease Brother     Heart Disease Father        Past Medical History:   Diagnosis Date    Arthritis     Bilateral carpal tunnel syndrome     right more than left    Diabetes mellitus ([facility]     diet controlled & cinnamon    DM2 (diabetes mellitus, type 2) ([facility] 2/16/2012    patient states not diabetic, family hx of diabetes; A1C-6.2    Endometrial cancer ([facility] [address] 1992    early small cured    GERD (gastroesophageal reflux disease)     HTN (hypertension) 2/16/2012    Hyperlipidemia     borderline- no meds    Hypertension     controlled    Obesity 5/28/2013    Osteoarthritis     Osteoarthritis of right knee     [place]    Sinus drainage     seasonal (winter)    Spondylosis of lumbosacral joint     Voice absence     only with anxiety       PHYSICAL EXAM:  CONSTITUTIONAL:  Well developed, well nourished    Vitals:    11/01/22 0950   BP: 112/66   Pulse: 72   Resp: 16   Weight: 195 lb 8 oz (88.7 kg)   Height: 5' 3\" (1.6 m)     HEAD & FACE: Normocephalic. Symmetric. EYES: No xanthelasma. Conjunctivae not injected. EARS, NOSE, MOUTH & THROAT: Good dentition. No oral pallor or cyanosis. NECK: No JVD at 30 degrees. No thyromegaly. RESPIRATORY: Clear to auscultation and percussion in all fields. No use of accessory muscle or intercostal retractions.    CARDIOVASCULAR: Irregularly irregular variable intensity of S1 normal S2. No carotid bruits. Abdominal aorta not enlarged. Femoral arteries without bruits. Pedal pulses 2+. No edema. ABDOMEN: Soft without hepatic or splenic enlargement. No tenderness. MUSCULOSKELETAL: No kyphosis or scoliosis of the back. Good muscle strength and tone. No muscle atrophy. Normal gait and ability to undergo exercise stress testing. EXTREMITIES: No clubbing or cyanosis. SKIN: No Xanthomas or ulcerations. NEUROLOGIC: Oriented to time, place and person. Normal mood and affect. LYMPHATIC:  No palpable neck or supraclavicular nodes. No splenomegaly. EKG: the EKG tracing was reviewed and found to reveal: Atrial fibrillation with controlled response QTc 411. ASSESSMENT:                                                     ORDERS:       Diagnosis Orders   1. Permanent atrial fibrillation (HCC)  EKG 12 Lead      2. Essential hypertension  EKG 12 Lead      3. Chronic anticoagulation  EKG 12 Lead        Above assessment cardiac issues stable. PLAN:   See above orders. Old records were reviewed and found to reveal: Creatinine 0.6  Continue current medical therapy. Discussed issues that would prompt earlier evaluation. Follow-up office visit in 12 months.

## 2022-11-03 ENCOUNTER — TELEPHONE (OUTPATIENT)
Dept: FAMILY MEDICINE CLINIC | Age: 82
End: 2022-11-03

## 2022-11-03 DIAGNOSIS — Z12.31 ENCOUNTER FOR SCREENING MAMMOGRAM FOR BREAST CANCER: Primary | ICD-10-CM

## 2022-11-03 NOTE — TELEPHONE ENCOUNTER
----- Message from [address] sent at 11/3/2022 11:47 AM EDT -----  Subject: Referral Request    Reason for referral request? Luis Spivey has an appointment 12/5 and would   like to have labs done prior, if needed. Provider patient wants to be referred to(if known):     Provider Phone Number(if known):     Additional Information for Provider?   ---------------------------------------------------------------------------  --------------  [email or web address]    [phone]; OK to leave message on voicemail  ---------------------------------------------------------------------------  --------------

## 2022-11-03 NOTE — TELEPHONE ENCOUNTER
Patient advised lab order in chart. She is to have the BMP repeated to check her sodium levels.      Also, patient is wanting to schedule her mammogram.     Order pending your approval

## 2022-11-09 ENCOUNTER — OFFICE VISIT (OUTPATIENT)
Dept: FAMILY MEDICINE CLINIC | Age: 82
End: 2022-11-09
Payer: MEDICARE

## 2022-11-09 VITALS
SYSTOLIC BLOOD PRESSURE: 118 MMHG | OXYGEN SATURATION: 98 % | HEART RATE: 59 BPM | HEIGHT: 63 IN | DIASTOLIC BLOOD PRESSURE: 72 MMHG | RESPIRATION RATE: 18 BRPM | BODY MASS INDEX: 34.2 KG/M2 | WEIGHT: 193 LBS

## 2022-11-09 DIAGNOSIS — J06.9 VIRAL URI: Primary | ICD-10-CM

## 2022-11-09 LAB
INFLUENZA A ANTIGEN, POC: NORMAL
INFLUENZA B ANTIGEN, POC: NORMAL
Lab: NORMAL
PERFORMING INSTRUMENT: NORMAL
QC PASS/FAIL: NORMAL
SARS-COV-2, POC: NORMAL

## 2022-11-09 PROCEDURE — 3074F SYST BP LT 130 MM HG: CPT | Performed by: FAMILY MEDICINE

## 2022-11-09 PROCEDURE — 99213 OFFICE O/P EST LOW 20 MIN: CPT | Performed by: FAMILY MEDICINE

## 2022-11-09 PROCEDURE — 87804 INFLUENZA ASSAY W/OPTIC: CPT | Performed by: FAMILY MEDICINE

## 2022-11-09 PROCEDURE — 1123F ACP DISCUSS/DSCN MKR DOCD: CPT | Performed by: FAMILY MEDICINE

## 2022-11-09 PROCEDURE — G8399 PT W/DXA RESULTS DOCUMENT: HCPCS | Performed by: FAMILY MEDICINE

## 2022-11-09 PROCEDURE — 3078F DIAST BP <80 MM HG: CPT | Performed by: FAMILY MEDICINE

## 2022-11-09 PROCEDURE — G8417 CALC BMI ABV UP PARAM F/U: HCPCS | Performed by: FAMILY MEDICINE

## 2022-11-09 PROCEDURE — 87426 SARSCOV CORONAVIRUS AG IA: CPT | Performed by: FAMILY MEDICINE

## 2022-11-09 PROCEDURE — G8484 FLU IMMUNIZE NO ADMIN: HCPCS | Performed by: FAMILY MEDICINE

## 2022-11-09 PROCEDURE — 1036F TOBACCO NON-USER: CPT | Performed by: FAMILY MEDICINE

## 2022-11-09 PROCEDURE — G8427 DOCREV CUR MEDS BY ELIG CLIN: HCPCS | Performed by: FAMILY MEDICINE

## 2022-11-09 PROCEDURE — 1090F PRES/ABSN URINE INCON ASSESS: CPT | Performed by: FAMILY MEDICINE

## 2022-11-09 RX ORDER — GUAIFENESIN 600 MG/1
600 TABLET, EXTENDED RELEASE ORAL 2 TIMES DAILY
Qty: 30 TABLET | Refills: 0 | Status: SHIPPED | OUTPATIENT
Start: 2022-11-09 | End: 2022-11-24

## 2022-11-09 RX ORDER — FLUTICASONE PROPIONATE 50 MCG
2 SPRAY, SUSPENSION (ML) NASAL DAILY
Qty: 48 G | Refills: 1 | Status: SHIPPED | OUTPATIENT
Start: 2022-11-09

## 2022-11-09 ASSESSMENT — ENCOUNTER SYMPTOMS
ABDOMINAL PAIN: 0
DIARRHEA: 0
VOMITING: 0
COUGH: 1
NAUSEA: 0
TROUBLE SWALLOWING: 0
CONSTIPATION: 0
WHEEZING: 1
SORE THROAT: 0

## 2022-11-09 NOTE — PROGRESS NOTES
11/16/2022    Bal Mast is a 81 y.o. femalehere for:    HPI:    Here for cough, congestion   Started week ago  No Fever   No Nausea or vomiting  Clear mucus with cough   No Sick exposure  Tired   Covid negative 11/03  Fully vaccinated   No headaches   No hx of COPD or asthma   NO smoking   Pt noted herself wheezing   No sinus symptoms   No seasonal allergies   Got flu vaccine too   Tried cough syrup       BP Readings from Last 3 Encounters:   11/09/22 118/72   11/01/22 112/66   06/20/22 120/72     Current Outpatient Medications   Medication Sig Dispense Refill    fluticasone (FLONASE) 50 MCG/ACT nasal spray 2 sprays by Each Nostril route daily 48 g 1    guaiFENesin (MUCINEX) 600 MG extended release tablet Take 1 tablet by mouth 2 times daily for 15 days 30 tablet 0    chlorthalidone (HYGROTON) 25 MG tablet Take 1 tablet by mouth daily 90 tablet 0    apixaban (ELIQUIS) 5 MG TABS tablet TAKE 1 TABLET BY MOUTH TWICE A DAY 60 tablet 3    metoprolol succinate (TOPROL XL) 50 MG extended release tablet TAKE 1 TABLET BY MOUTH TWICE A  tablet 1    irbesartan (AVAPRO) 150 MG tablet Take 1 tablet by mouth daily 90 tablet 2    vitamin B-12 (CYANOCOBALAMIN) 1000 MCG tablet Take 1,000 mcg by mouth daily      ELDERBERRY PO Take 100 mg by mouth daily      blood glucose monitor strips Test 2 times a day & as needed for symptoms of irregular blood glucose. Dispense sufficient amount for indicated testing frequency plus additional to accommodate PRN testing needs. 100 strip 5    FreeStyle Lancets MISC 1 each by Does not apply route 2 times daily 100 each 5    Alcohol Swabs PADS 1 each by Does not apply route 4 times daily 100 each 5    blood glucose monitor kit and supplies As per patients insurance coverage; please dispense sufficient amount for indicated testing 2 x daily plus additional to accommodate PRN testing needs. #90 day supply with one refill.  Dispense all needed supplies to include: monitor, strips, lancing device, lancets, control solutions, alcohol swabs. 1 kit 0    Handicap Placard MISC by Does not apply route 1 each 0    vitamin C (ASCORBIC ACID) 500 MG tablet Take 1,000 mg by mouth 2 times daily      BETA CAROTENE PO Take 1 tablet by mouth daily      acetaminophen (APAP EXTRA STRENGTH) 500 MG tablet Take 1 tablet by mouth every 6 hours as needed for Pain 20 tablet 0    Coenzyme Q10 (COQ-10) 200 MG CAPS Take 1 capsule by mouth daily      CRANBERRY PO Take 2 tablets by mouth daily       Cinnamon 500 MG TABS Take 1 tablet by mouth daily       Multiple Vitamin (MULTIVITAMIN PO) Take 1 tablet by mouth daily       nystatin (MYCOSTATIN) 911276 UNIT/GM powder Apply 3 times daily for a week (Patient not taking: No sig reported) 60 g 1     No current facility-administered medications for this visit.       Allergies   Allergen Reactions    Penicillins Anaphylaxis    Aspirin Other (See Comments)     Severe nose bleed    Losartan Potassium-Hctz     Omeprazole     Protonix [Pantoprazole]      Neck muscle spasm    Cyclobenzaprine      Description sounds like muscle spasm worsening rather than SE to med       Past Medical & Surgical History:      Diagnosis Date    Arthritis     Bilateral carpal tunnel syndrome     right more than left    Diabetes mellitus (HCC)     diet controlled & cinnamon    DM2 (diabetes mellitus, type 2) ([facility] 2/16/2012    patient states not diabetic, family hx of diabetes; A1C-6.2    Endometrial cancer ([facility] [address] 1992    early small cured    GERD (gastroesophageal reflux disease)     HTN (hypertension) 2/16/2012    Hyperlipidemia     borderline- no meds    Hypertension     controlled    Obesity 5/28/2013    Osteoarthritis     Osteoarthritis of right knee     Morris    Sinus drainage     seasonal (winter)    Spondylosis of lumbosacral joint     Voice absence     only with anxiety     Past Surgical History:   Procedure Laterality Date    CATARACT REMOVAL  2022    COLONOSCOPY  [address]    1 hyperplastic polyp 2mm    HYSTERECTOMY (CERVIX STATUS UNKNOWN)  1990's    AZALEA BSO    JOINT REPLACEMENT Right     Knee    OTHER SURGICAL HISTORY  03/11/2015    EGD with Biopsy    AZALEA AND BSO (CERVIX REMOVED)      1990's AZALEA and BSO    TONSILLECTOMY      age 13    TOTAL KNEE ARTHROPLASTY  02/2012    right    UPPER GASTROINTESTINAL ENDOSCOPY  2008       Family History:      Problem Relation Age of Onset    Diabetes Mother     Heart Disease Brother     Heart Disease Father        Social History:  Social History     Tobacco Use    Smoking status: Never    Smokeless tobacco: Never   Substance Use Topics    Alcohol use: No       Immunization History   Administered Date(s) Administered    COVID-19, PFIZER PURPLE top, DILUTE for use, (age 15 y+), 30mcg/0.3mL 02/08/2021, 03/01/2021, 10/06/2021    Influenza 10/08/2013    Influenza Vaccine, unspecified formulation 10/01/2016    Influenza Virus Vaccine 10/01/2014    Influenza, FLUAD, (age 72 y+), Adjuvanted, 0.5mL 09/22/2021    Influenza, High Dose (Fluzone 65 yrs and older) 10/12/2015, 10/01/2018    Influenza, Triv, inactivated, subunit, adjuvanted, IM (Fluad 65 yrs and older) 09/30/2019    Pneumococcal Conjugate 13-valent ([id number]) 07/29/2015    Pneumococcal Polysaccharide ([id number]) 10/08/2013    Tdap (Boostrix, Adacel) 11/29/2016    Zoster Live (Zostavax) 01/01/2013    Zoster Recombinant (Shingrix) 02/19/2020       Review of Systems   Constitutional:  Negative for chills and fever. HENT:  Positive for congestion. Negative for sore throat and trouble swallowing. Respiratory:  Positive for cough and wheezing. Cardiovascular:  Negative for chest pain and leg swelling. Gastrointestinal:  Negative for abdominal pain, constipation, diarrhea, nausea and vomiting. Genitourinary:  Negative for difficulty urinating. Musculoskeletal:  Negative for arthralgias and myalgias. Skin:  Negative for rash and wound. Neurological:  Negative for dizziness and headaches. Psychiatric/Behavioral:  Negative for agitation. VS:  /72   Pulse 59   Resp 18   Ht 5' 3\" (1.6 m)   Wt 193 lb (87.5 kg)   SpO2 98%   BMI 34.19 kg/m²     Physical Exam  Constitutional:       Appearance: Normal appearance. HENT:      Head: Normocephalic. Nose: Nose normal. No rhinorrhea. Eyes:      General: No scleral icterus. Conjunctiva/sclera: Conjunctivae normal.   Cardiovascular:      Rate and Rhythm: Normal rate and regular rhythm. Pulses: Normal pulses. Heart sounds: Normal heart sounds. No murmur heard. Pulmonary:      Breath sounds: Normal breath sounds. No wheezing, rhonchi or rales. Abdominal:      General: Abdomen is flat. Bowel sounds are normal.   Musculoskeletal:      Right lower leg: No edema. Left lower leg: No edema. Skin:     General: Skin is warm. Findings: No rash. Neurological:      General: No focal deficit present. Mental Status: She is alert. Assessment/Plan:  Viral URI  Cough likely secondary to upper respiratory infection  COVID and influenza negative in the clinic  Continue supportive measures, continue hydration, use of humidifier, analgesics as needed, patient can use saline mist as well  - POCT Influenza A/B Antigen (BD Veritor)  - POCT COVID-19, Antigen    Follow up: As scheduled with PCP    Patient agrees with the above stated plan. Janny received counseling on the following healthy behaviors: nutrition, exercise, and medication adherence.     Angy Calle MD  PGY-3 Family Medicine

## 2022-11-09 NOTE — PROGRESS NOTES
[address]  Precepting Note    Subjective:  Cough, congestion x 1 week   Wheezing  Covid test negative  OTC cough syrup helping    ROS otherwise negative     Past medical, surgical, family and social history were reviewed, non-contributory, and unchanged unless otherwise stated. Objective:    /72   Pulse 59   Resp 18   Ht 5' 3\" (1.6 m)   Wt 193 lb (87.5 kg)   SpO2 98%   BMI 34.19 kg/m²     Exam is as noted by resident with the following changes, additions or corrections:    General:  NAD; alert & oriented x 3   HENT: sinuses non tender. No lymphadenopathy. Oropharynx clear   Heart:  RRR, no murmurs, gallops, or rubs. Lungs:  CTA bilaterally, no wheeze, rales or rhonchi  Abd: bowel sounds present, nontender, nondistended, no masses  Extrem:  No clubbing, cyanosis, or edema    Assessment/Plan:  URI symptoms- check covid, flu. Supportive management     Attending Physician Statement  I have reviewed the chart, including any radiology or labs. I have discussed the case, including pertinent history and exam findings with the resident. I agree with the assessment, plan and orders as documented by the resident. Please refer to the resident note for additional information.       Electronically signed by Catrachita Gaines MD on 11/9/2022 at 2:26 PM

## 2022-11-23 ENCOUNTER — TELEPHONE (OUTPATIENT)
Dept: FAMILY MEDICINE CLINIC | Age: 82
End: 2022-11-23

## 2022-11-23 DIAGNOSIS — E78.2 MIXED HYPERLIPIDEMIA: ICD-10-CM

## 2022-11-23 DIAGNOSIS — I10 ESSENTIAL HYPERTENSION: Primary | ICD-10-CM

## 2022-11-23 DIAGNOSIS — Z12.31 ENCOUNTER FOR SCREENING MAMMOGRAM FOR BREAST CANCER: ICD-10-CM

## 2022-11-23 DIAGNOSIS — E11.9 TYPE 2 DIABETES MELLITUS WITHOUT COMPLICATION, WITHOUT LONG-TERM CURRENT USE OF INSULIN (HCC): ICD-10-CM

## 2022-11-23 NOTE — TELEPHONE ENCOUNTER
Please place lab orders for patient, she'd like to get them done before her next appt. She would also like her mammogram order sent to [facility]. Thank you!

## 2022-12-05 ENCOUNTER — HOSPITAL ENCOUNTER (OUTPATIENT)
Age: 82
Discharge: HOME | End: 2022-12-05
Payer: MEDICARE

## 2022-12-05 ENCOUNTER — HOSPITAL ENCOUNTER (OUTPATIENT)
Dept: MAMMOGRAPHY | Age: 82
Discharge: HOME | End: 2022-12-07
Payer: MEDICARE

## 2022-12-05 ENCOUNTER — OFFICE VISIT (OUTPATIENT)
Dept: FAMILY MEDICINE CLINIC | Age: 82
End: 2022-12-05

## 2022-12-05 VITALS
BODY MASS INDEX: 33.84 KG/M2 | OXYGEN SATURATION: 100 % | HEART RATE: 73 BPM | HEIGHT: 63 IN | TEMPERATURE: 97.5 F | DIASTOLIC BLOOD PRESSURE: 67 MMHG | SYSTOLIC BLOOD PRESSURE: 122 MMHG | WEIGHT: 191 LBS | RESPIRATION RATE: 16 BRPM

## 2022-12-05 DIAGNOSIS — E11.9 TYPE 2 DIABETES MELLITUS WITHOUT COMPLICATION, WITHOUT LONG-TERM CURRENT USE OF INSULIN (HCC): ICD-10-CM

## 2022-12-05 DIAGNOSIS — E78.2 MIXED HYPERLIPIDEMIA: ICD-10-CM

## 2022-12-05 DIAGNOSIS — K21.9 GASTROESOPHAGEAL REFLUX DISEASE WITHOUT ESOPHAGITIS: ICD-10-CM

## 2022-12-05 DIAGNOSIS — I48.91 ATRIAL FIBRILLATION, UNSPECIFIED TYPE (HCC): ICD-10-CM

## 2022-12-05 DIAGNOSIS — I10 ESSENTIAL HYPERTENSION: ICD-10-CM

## 2022-12-05 DIAGNOSIS — E11.9 TYPE 2 DIABETES MELLITUS WITHOUT COMPLICATION, WITHOUT LONG-TERM CURRENT USE OF INSULIN (HCC): Primary | ICD-10-CM

## 2022-12-05 DIAGNOSIS — Z12.31 ENCOUNTER FOR SCREENING MAMMOGRAM FOR BREAST CANCER: ICD-10-CM

## 2022-12-05 LAB
ALBUMIN SERPL-MCNC: 3.9 G/DL (ref 3.5–5.2)
ALP BLD-CCNC: 122 U/L (ref 35–104)
ALT SERPL-CCNC: 14 U/L (ref 0–32)
ANION GAP SERPL CALCULATED.3IONS-SCNC: 10 MMOL/L (ref 7–16)
AST SERPL-CCNC: 23 U/L (ref 0–31)
BILIRUB SERPL-MCNC: 0.6 MG/DL (ref 0–1.2)
BUN BLDV-MCNC: 14 MG/DL (ref 6–23)
CALCIUM SERPL-MCNC: 9.6 MG/DL (ref 8.6–10.2)
CHLORIDE BLD-SCNC: 93 MMOL/L (ref 98–107)
CHOLESTEROL, FASTING: 181 MG/DL (ref 0–199)
CO2: 25 MMOL/L (ref 22–29)
CREAT SERPL-MCNC: 0.6 MG/DL (ref 0.5–1)
GFR SERPL CREATININE-BSD FRML MDRD: >60 ML/MIN/1.73
GLUCOSE BLD-MCNC: 172 MG/DL (ref 74–99)
HBA1C MFR BLD: 6.6 % (ref 4–5.6)
HCT VFR BLD CALC: 41.2 % (ref 34–48)
HDLC SERPL-MCNC: 40 MG/DL
HEMOGLOBIN: 13.9 G/DL (ref 11.5–15.5)
LDL CHOLESTEROL CALCULATED: 115 MG/DL (ref 0–99)
MCH RBC QN AUTO: 32.4 PG (ref 26–35)
MCHC RBC AUTO-ENTMCNC: 33.7 % (ref 32–34.5)
MCV RBC AUTO: 96 FL (ref 80–99.9)
PDW BLD-RTO: 12.9 FL (ref 11.5–15)
PLATELET # BLD: 266 E9/L (ref 130–450)
PMV BLD AUTO: 9.9 FL (ref 7–12)
POTASSIUM SERPL-SCNC: 3.9 MMOL/L (ref 3.5–5)
RBC # BLD: 4.29 E12/L (ref 3.5–5.5)
SODIUM BLD-SCNC: 128 MMOL/L (ref 132–146)
TOTAL PROTEIN: 7.6 G/DL (ref 6.4–8.3)
TRIGLYCERIDE, FASTING: 130 MG/DL (ref 0–149)
TSH SERPL DL<=0.05 MIU/L-ACNC: 1.6 UIU/ML (ref 0.27–4.2)
VLDLC SERPL CALC-MCNC: 26 MG/DL
WBC # BLD: 6.9 E9/L (ref 4.5–11.5)

## 2022-12-05 PROCEDURE — 80061 LIPID PANEL: CPT

## 2022-12-05 PROCEDURE — 77067 SCR MAMMO BI INCL CAD: CPT

## 2022-12-05 PROCEDURE — 36415 COLL VENOUS BLD VENIPUNCTURE: CPT

## 2022-12-05 PROCEDURE — 85027 COMPLETE CBC AUTOMATED: CPT

## 2022-12-05 PROCEDURE — 80053 COMPREHEN METABOLIC PANEL: CPT

## 2022-12-05 PROCEDURE — 83036 HEMOGLOBIN GLYCOSYLATED A1C: CPT

## 2022-12-05 PROCEDURE — 84443 ASSAY THYROID STIM HORMONE: CPT

## 2022-12-05 RX ORDER — IRBESARTAN 150 MG/1
150 TABLET ORAL DAILY
Qty: 90 TABLET | Refills: 2 | Status: SHIPPED
Start: 2022-12-05 | End: 2022-12-14 | Stop reason: SDUPTHER

## 2022-12-05 RX ORDER — METOPROLOL SUCCINATE 50 MG/1
50 TABLET, EXTENDED RELEASE ORAL 2 TIMES DAILY
Qty: 180 TABLET | Refills: 2 | Status: SHIPPED | OUTPATIENT
Start: 2022-12-05

## 2022-12-05 RX ORDER — FLUTICASONE PROPIONATE 50 MCG
2 SPRAY, SUSPENSION (ML) NASAL DAILY
Qty: 48 G | Refills: 1 | Status: SHIPPED | OUTPATIENT
Start: 2022-12-05

## 2022-12-05 RX ORDER — LANSOPRAZOLE 15 MG/1
30 CAPSULE, DELAYED RELEASE ORAL DAILY
Qty: 90 CAPSULE | Refills: 2 | Status: SHIPPED | OUTPATIENT
Start: 2022-12-05

## 2022-12-05 RX ORDER — CHLORTHALIDONE 25 MG/1
25 TABLET ORAL DAILY
Qty: 90 TABLET | Refills: 2 | Status: SHIPPED
Start: 2022-12-05 | End: 2022-12-14 | Stop reason: SINTOL

## 2022-12-05 SDOH — ECONOMIC STABILITY: FOOD INSECURITY: WITHIN THE PAST 12 MONTHS, YOU WORRIED THAT YOUR FOOD WOULD RUN OUT BEFORE YOU GOT MONEY TO BUY MORE.: NEVER TRUE

## 2022-12-05 SDOH — ECONOMIC STABILITY: FOOD INSECURITY: WITHIN THE PAST 12 MONTHS, THE FOOD YOU BOUGHT JUST DIDN'T LAST AND YOU DIDN'T HAVE MONEY TO GET MORE.: NEVER TRUE

## 2022-12-05 ASSESSMENT — SOCIAL DETERMINANTS OF HEALTH (SDOH): HOW HARD IS IT FOR YOU TO PAY FOR THE VERY BASICS LIKE FOOD, HOUSING, MEDICAL CARE, AND HEATING?: NOT HARD AT ALL

## 2022-12-05 ASSESSMENT — ENCOUNTER SYMPTOMS
ABDOMINAL PAIN: 0
CHEST TIGHTNESS: 0
SHORTNESS OF BREATH: 0

## 2022-12-05 NOTE — PROGRESS NOTES
[facility]  Family Medicine Residency  Phone: [phone]  Fax: [phone]    Patient:  Tom Lancaster 81 y.o. female                                 Date of Service: 12/5/22                            Chiefcomplaint:   Chief Complaint   Patient presents with    Hypertension         History of Present Illness: The patient is a 81 y.o. female  presented to the clinic with complaints as above. HTN - tolerating meds. No Cv sx to report. DM - Not checking GLC at home. Has been diet controlled. No sx to report. Afib - rate controlled, following with cardiology. No bleeding with eliquis. No CP/SOB/claudication. GERD - sx occurring multiple times per week, reports no reflux sx, but does get pain over the stomach. Not using TUMS. Moods - recent loss of her brother less than a month ago. Overall reports some lower energy but overall likely not pathologic. Review of Systems:   Review of Systems   Respiratory:  Negative for chest tightness and shortness of breath. Cardiovascular:  Negative for chest pain and palpitations. Gastrointestinal:  Negative for abdominal pain.      Past Medical History:      Diagnosis Date    Arthritis     Bilateral carpal tunnel syndrome     right more than left    Diabetes mellitus (HCC)     diet controlled & cinnamon    DM2 (diabetes mellitus, type 2) ([facility] [address] 2/16/2012    patient states not diabetic, family hx of diabetes; A1C-6.2    Endometrial cancer ([facility] [address] 1992    early small cured    GERD (gastroesophageal reflux disease)     HTN (hypertension) 2/16/2012    Hyperlipidemia     borderline- no meds    Hypertension     controlled    Obesity 5/28/2013    Osteoarthritis     Osteoarthritis of right knee     [place]    Sinus drainage     seasonal (winter)    Spondylosis of lumbosacral joint     Voice absence     only with anxiety       Past Surgical History:        Procedure Laterality Date    CATARACT REMOVAL  2022    [address]ONOSCOPY [address]    1 hyperplastic polyp 2mm    HYSTERECTOMY (CERVIX STATUS UNKNOWN)  1990's    AZALEA BSO    JOINT REPLACEMENT Right     Knee    OTHER SURGICAL HISTORY  03/11/2015    EGD with Biopsy    AZALEA AND BSO (CERVIX REMOVED)      1990's AZALEA and BSO    TONSILLECTOMY      age 13    TOTAL KNEE ARTHROPLASTY  02/2012    right    UPPER GASTROINTESTINAL ENDOSCOPY  2008       Allergies:    Penicillins, Aspirin, Losartan potassium-hctz, Omeprazole, Protonix [pantoprazole], and Cyclobenzaprine    Social History:   Social History     Socioeconomic History    Marital status:       Spouse name: Not on file    Number of children: 0    Years of education: 6    Highest education level: Associate degree: occupational, technical, or vocational program   Occupational History    Occupation: beautician   Tobacco Use    Smoking status: Never    Smokeless tobacco: Never   Vaping Use    Vaping Use: Never used   Substance and Sexual Activity    Alcohol use: No    Drug use: No    Sexual activity: Not Currently   Other Topics Concern    Not on file   Social History Narrative    Not on file     Social Determinants of Health     Financial Resource Strain: Low Risk     Difficulty of Paying Living Expenses: Not hard at all   Food Insecurity: No Food Insecurity    Worried About Running Out of Food in the Last Year: Never true    [address] [place] [address] Place of Food in the Last Year: Never true   Transportation Needs: Not on file   Physical Activity: Insufficiently Active    Days of Exercise per Week: 7 days    Minutes of Exercise per Session: 10 min   Stress: Not on file   Social Connections: Not on file   Intimate Partner Violence: Not on file   Housing Stability: Not on file        Family History:       Problem Relation Age of Onset    Diabetes Mother     Heart Disease Brother     Heart Disease Father        Physical Exam:    Vitals: /67   Pulse 73   Temp 97.5 °F (36.4 °C)   Resp 16   Ht 5' 3\" (1.6 m)   Wt 191 lb (86.6 kg)   SpO2 100%   BMI 33.83 kg/m²   BP Readings from Last 3 Encounters:   12/05/22 122/67   11/09/22 118/72   11/01/22 112/66     General Appearance: Well developed, awake, alert, oriented, no acute distress  Chest wall/Lung: Clear to auscultation bilaterally,  respirations unlabored. No ronchi/wheezing/rales  Heart[de-identified]  Irregular rate and rhythm, S1and S2 normal, no murmur, rub or gallop. Abdomen: Soft, mild epigastrictenderness, bowel sounds normoactive, no masses, no organomegaly  Extremities:  Extremities normal, atraumatic, no cyanosis. +1 edema. Neurologic:   Alert&Oriented. Normal gait and coordination  No focal neurological deficits appreaciated         Psychiatric: has a normal mood and affect. Behavior is normal.       Assessment and Plan:         1. Type 2 diabetes mellitus without complication, without long-term current use of insulin ([facility])  controlled    2. Atrial fibrillation, unspecified type ([facility])  Stable cont meds  - metoprolol succinate (TOPROL XL) 50 MG extended release tablet; Take 1 tablet by mouth in the morning and at bedtime  Dispense: 180 tablet; Refill: 2  - apixaban (ELIQUIS) 5 MG TABS tablet; Take 1 tablet by mouth 2 times daily  Dispense: 180 tablet; Refill: 2    3. Essential hypertension  Slight hyponatremia likely secondary to thiazide diuretic. Will monitor for now as initially it was very minimal.      4. Gastroesophageal reflux disease without esophagitis  PPI recommended. - lansoprazole (PREVACID) 15 MG delayed release capsule; Take 2 capsules by mouth daily 30-60min prior to your first meal  Dispense: 90 capsule; Refill: 2        Return to Office: Return in about 3 months (around 3/5/2023) for AWV after 6/20/23, High Blood Pressure, Diabetes, Marcus Mistry. I encourage further reading and education about your health conditions. Information on many healthconditions is provided by the American Academy of Family Physicians: https://familydoctor. org/  Please bring any questions to me at your next visit.    This document may have been prepared at least partiallythrough the use of voice recognition software. Although effort is taken to assure the accuracy of this document, it is possible that grammatical, syntax,  or spelling errors may occur. Medication List:    Current Outpatient Medications   Medication Sig Dispense Refill    metoprolol succinate (TOPROL XL) 50 MG extended release tablet Take 1 tablet by mouth in the morning and at bedtime 180 tablet 2    apixaban (ELIQUIS) 5 MG TABS tablet Take 1 tablet by mouth 2 times daily 180 tablet 2    fluticasone (FLONASE) 50 MCG/ACT nasal spray 2 sprays by Each Nostril route daily 48 g 1    lansoprazole (PREVACID) 15 MG delayed release capsule Take 2 capsules by mouth daily 30-60min prior to your first meal 90 capsule 2    nystatin (MYCOSTATIN) 718284 UNIT/GM powder Apply 3 times daily for a week 60 g 1    vitamin B-12 (CYANOCOBALAMIN) 1000 MCG tablet Take 1,000 mcg by mouth daily      ELDERBERRY PO Take 100 mg by mouth daily      blood glucose monitor strips Test 2 times a day & as needed for symptoms of irregular blood glucose. Dispense sufficient amount for indicated testing frequency plus additional to accommodate PRN testing needs. 100 strip 5    FreeStyle Lancets MISC 1 each by Does not apply route 2 times daily 100 each 5    Alcohol Swabs PADS 1 each by Does not apply route 4 times daily 100 each 5    blood glucose monitor kit and supplies As per patients insurance coverage; please dispense sufficient amount for indicated testing 2 x daily plus additional to accommodate PRN testing needs. #90 day supply with one refill. Dispense all needed supplies to include: monitor, strips, lancing device, lancets, control solutions, alcohol swabs.  1 kit 0    Handicap Placard MISC by Does not apply route 1 each 0    vitamin C (ASCORBIC ACID) 500 MG tablet Take 1,000 mg by mouth 2 times daily      BETA CAROTENE PO Take 1 tablet by mouth daily      acetaminophen (APAP EXTRA STRENGTH) 500 MG tablet Take 1 tablet by mouth every 6 hours as needed for Pain 20 tablet 0    Coenzyme Q10 (COQ-10) 200 MG CAPS Take 1 capsule by mouth daily      CRANBERRY PO Take 2 tablets by mouth daily       Cinnamon 500 MG TABS Take 1 tablet by mouth daily       Multiple Vitamin (MULTIVITAMIN PO) Take 1 tablet by mouth daily       irbesartan (AVAPRO) 150 MG tablet Take 2 tablets by mouth daily 90 tablet 2     No current facility-administered medications for this visit.         Rollo Bumpers, MD

## 2022-12-12 ENCOUNTER — TELEPHONE (OUTPATIENT)
Dept: FAMILY MEDICINE CLINIC | Age: 82
End: 2022-12-12

## 2022-12-12 DIAGNOSIS — I10 ESSENTIAL HYPERTENSION: ICD-10-CM

## 2022-12-12 NOTE — TELEPHONE ENCOUNTER
Patient called in to ask about her medication and blood work, she called her insurance and they told her that the chlorthalidone could cause low sodium and other issues, she wanted to know if that can be changed to something else that will not affect her sodium or liver.

## 2022-12-14 RX ORDER — IRBESARTAN 150 MG/1
300 TABLET ORAL DAILY
Qty: 90 TABLET | Refills: 2 | Status: SHIPPED | OUTPATIENT
Start: 2022-12-14

## 2022-12-15 NOTE — TELEPHONE ENCOUNTER
Called patient; advised to start taking 2 Ibersartan 150 mg tablets per day.  One in AM and one in PM.     Scheduled 1-4-2023 for BMP and BP check

## 2022-12-29 ENCOUNTER — TELEPHONE (OUTPATIENT)
Dept: FAMILY MEDICINE CLINIC | Age: 82
End: 2022-12-29

## 2022-12-29 ENCOUNTER — OFFICE VISIT (OUTPATIENT)
Dept: FAMILY MEDICINE CLINIC | Age: 82
End: 2022-12-29

## 2022-12-29 VITALS
WEIGHT: 203 LBS | BODY MASS INDEX: 35.97 KG/M2 | HEART RATE: 96 BPM | SYSTOLIC BLOOD PRESSURE: 134 MMHG | RESPIRATION RATE: 16 BRPM | DIASTOLIC BLOOD PRESSURE: 95 MMHG | HEIGHT: 63 IN | TEMPERATURE: 98.7 F | OXYGEN SATURATION: 95 %

## 2022-12-29 DIAGNOSIS — E66.01 SEVERE OBESITY (BMI 35.0-39.9) WITH COMORBIDITY (HCC): ICD-10-CM

## 2022-12-29 DIAGNOSIS — E87.1 HYPONATREMIA: ICD-10-CM

## 2022-12-29 DIAGNOSIS — R60.1 GENERALIZED EDEMA: Primary | ICD-10-CM

## 2022-12-29 DIAGNOSIS — I10 ESSENTIAL HYPERTENSION: ICD-10-CM

## 2022-12-29 RX ORDER — IRBESARTAN 300 MG/1
300 TABLET ORAL DAILY
Qty: 90 TABLET | Refills: 2 | Status: SHIPPED | OUTPATIENT
Start: 2022-12-29

## 2022-12-29 RX ORDER — FUROSEMIDE 20 MG/1
20 TABLET ORAL DAILY
Qty: 30 TABLET | Refills: 0 | Status: SHIPPED | OUTPATIENT
Start: 2022-12-29

## 2022-12-29 NOTE — PROGRESS NOTES
[id number] [facility]  Family Medicine Residency  Phone: [phone]  Fax: [phone]    Patient:  Josey Leonard 81 y.o. female                                 Date of Service: 12/29/22                            Chiefcomplaint:   Chief Complaint   Patient presents with    Edema     Hands and feet     Wheezing         History of Present Illness: The patient is a 81 y.o. female  presented to the clinic with complaints as above.    hyponatremia - recently patient inquired about mild hyponatremia caused by chlorthalidone. We stopped the thiazide and doubled the irbesartan. She also started eating potato chips to help raise the sodium. She is reporting about a week later she started wheezing and she is concerned about this being a reaction to the increase ARB. She started swelling but isn't clear on the details. She is up about 10lbs in the past 2 weeks. Review of Systems:   Review of Systems   Constitutional:  Negative for chills and fever. Respiratory:  Positive for cough and shortness of breath. Cardiovascular:  Positive for leg swelling.      Past Medical History:      Diagnosis Date    Arthritis     Bilateral carpal tunnel syndrome     right more than left    Diabetes mellitus (HCC)     diet controlled & cinnamon    DM2 (diabetes mellitus, type 2) ([address] 2/16/2012    patient states not diabetic, family hx of diabetes; A1C-6.2    Endometrial cancer ([address] 1992    early small cured    GERD (gastroesophageal reflux disease)     HTN (hypertension) 2/16/2012    Hyperlipidemia     borderline- no meds    Hypertension     controlled    Obesity 5/28/2013    Osteoarthritis     Osteoarthritis of right knee     [place]    Sinus drainage     seasonal (winter)    Spondylosis of lumbosacral joint     Voice absence     only with anxiety       Past Surgical History:        Procedure Laterality Date    CATARACT REMOVAL  2022    COLONOSCOPY  [address]    1 hyperplastic polyp 2mm    HYSTERECTOMY (CERVIX STATUS UNKNOWN)  1990's    AZALEA BSO    JOINT REPLACEMENT Right     Knee    OTHER SURGICAL HISTORY  03/11/2015    EGD with Biopsy    AZALEA AND BSO (CERVIX REMOVED)      1990's AZALEA and BSO    TONSILLECTOMY      age 13    TOTAL KNEE ARTHROPLASTY  02/2012    right    UPPER GASTROINTESTINAL ENDOSCOPY  2008       Allergies:    Penicillins, Aspirin, Losartan potassium-hctz, Omeprazole, Protonix [pantoprazole], and Cyclobenzaprine    Social History:   Social History     Socioeconomic History    Marital status:       Spouse name: Not on file    Number of children: 0    Years of education: 6    Highest education level: Associate degree: occupational, technical, or vocational program   Occupational History    Occupation: Shotlstician   Tobacco Use    Smoking status: Never    Smokeless tobacco: Never   Vaping Use    Vaping Use: Never used   Substance and Sexual Activity    Alcohol use: No    Drug use: No    Sexual activity: Not Currently   Other Topics Concern    Not on file   Social History Narrative    Not on file     Social Determinants of Health     Financial Resource Strain: Low Risk     Difficulty of Paying Living Expenses: Not hard at all   Food Insecurity: No Food Insecurity    Worried About Running Out of Food in the Last Year: Never true    [place] of Food in the Last Year: Never true   Transportation Needs: Not on file   Physical Activity: Insufficiently Active    Days of Exercise per Week: 7 days    Minutes of Exercise per Session: 10 min   Stress: Not on file   Social Connections: Not on file   Intimate Partner Violence: Not on file   Housing Stability: Not on file        Family History:       Problem Relation Age of Onset    Diabetes Mother     Heart Disease Brother     Heart Disease Father        Physical Exam:    Vitals: BP (!) 134/95   Pulse 96   Temp 98.7 °F (37.1 °C)   Resp 16   Ht 5' 3\" (1.6 m)   Wt 203 lb (92.1 kg)   SpO2 95%   BMI 35.96 kg/m²   BP Readings from Last 3 Encounters: 12/29/22 (!) 134/95   12/05/22 122/67   11/09/22 118/72     General Appearance: Well developed, awake, alert, oriented, no acute distress  Chest wall/Lung: Rales bilateral inferior lung fields middle and upper lung fields are clear. Heart[de-identified]  Regular rate and rhythm, S1and S2 normal, no murmur, rub or gallop. Abdomen: Trace edema anterior abdominal wall  Extremities:  Extremities normal, atraumatic, no cyanosis. +2-3 edema upper and lower legs respectively. Neurologic:   Alert&Oriented. Normal gait and coordination  No focal neurological deficits appreaciated         Psychiatric: has a normal mood and affect. Behavior is normal.       Assessment and Plan:         1. Generalized edema  Check echocardiogram to determine if heart failure is involved. Check BMP given recent hyponatremia. Add Lasix daily this will likely also reduce her blood pressure somewhat. Follow-up in approximately a week  - ECHO 2D WO Color Doppler Complete; Future  - Basic Metabolic Panel; Future  - furosemide (LASIX) 20 MG tablet; Take 1 tablet by mouth daily  Dispense: 30 tablet; Refill: 0    2. Severe obesity (BMI 35.0-39. 9) with comorbidity ([facility]  We will diurese the patient and also recommend diet and lifestyle changes. 3. Hyponatremia  Recheck BMP I think with the patient is referring to his wheezing is likely Rales and the cough is likely from pulmonary congestion.  - Basic Metabolic Panel; Future  What she is calling wheezing likely represents fluid accumulation. Will diurese and re-eval.     Return to Office: Return in about 1 week (around 1/5/2023) for edema. I encourage further reading and education about your health conditions. Information on many healthconditions is provided by the American Academy of Family Physicians: https://familydoctor. org/  Please bring any questions to me at your next visit. This document may have been prepared at least partiallythrough the use of voice recognition software.  Although effort is taken to assure the accuracy of this document, it is possible that grammatical, syntax,  or spelling errors may occur. Medication List:    Current Outpatient Medications   Medication Sig Dispense Refill    furosemide (LASIX) 20 MG tablet Take 1 tablet by mouth daily 30 tablet 0    metoprolol succinate (TOPROL XL) 50 MG extended release tablet Take 1 tablet by mouth in the morning and at bedtime 180 tablet 2    apixaban (ELIQUIS) 5 MG TABS tablet Take 1 tablet by mouth 2 times daily 180 tablet 2    fluticasone (FLONASE) 50 MCG/ACT nasal spray 2 sprays by Each Nostril route daily 48 g 1    lansoprazole (PREVACID) 15 MG delayed release capsule Take 2 capsules by mouth daily 30-60min prior to your first meal 90 capsule 2    nystatin (MYCOSTATIN) 392025 UNIT/GM powder Apply 3 times daily for a week 60 g 1    vitamin B-12 (CYANOCOBALAMIN) 1000 MCG tablet Take 1,000 mcg by mouth daily      ELDERBERRY PO Take 100 mg by mouth daily      blood glucose monitor strips Test 2 times a day & as needed for symptoms of irregular blood glucose. Dispense sufficient amount for indicated testing frequency plus additional to accommodate PRN testing needs. 100 strip 5    FreeStyle Lancets MISC 1 each by Does not apply route 2 times daily 100 each 5    Alcohol Swabs PADS 1 each by Does not apply route 4 times daily 100 each 5    blood glucose monitor kit and supplies As per patients insurance coverage; please dispense sufficient amount for indicated testing 2 x daily plus additional to accommodate PRN testing needs. #90 day supply with one refill. Dispense all needed supplies to include: monitor, strips, lancing device, lancets, control solutions, alcohol swabs.  1 kit 0    Handicap Placard MISC by Does not apply route 1 each 0    vitamin C (ASCORBIC ACID) 500 MG tablet Take 1,000 mg by mouth 2 times daily      BETA CAROTENE PO Take 1 tablet by mouth daily      acetaminophen (APAP EXTRA STRENGTH) 500 MG tablet Take 1 tablet by mouth every 6 hours as needed for Pain 20 tablet 0    Coenzyme Q10 (COQ-10) 200 MG CAPS Take 1 capsule by mouth daily      CRANBERRY PO Take 2 tablets by mouth daily       Cinnamon 500 MG TABS Take 1 tablet by mouth daily       Multiple Vitamin (MULTIVITAMIN PO) Take 1 tablet by mouth daily       irbesartan (AVAPRO) 300 MG tablet Take 1 tablet by mouth daily 90 tablet 2     No current facility-administered medications for this visit.         Nicolasa Sands MD

## 2022-12-29 NOTE — TELEPHONE ENCOUNTER
According to the pharmacy, for the Irbesartan 150 mg - max on insurance is 1 tablet daily     Is 300mg tablets appropriate?      If so, please resend to [facility]

## 2023-01-04 ENCOUNTER — NURSE ONLY (OUTPATIENT)
Dept: FAMILY MEDICINE CLINIC | Age: 83
End: 2023-01-04
Payer: COMMERCIAL

## 2023-01-04 DIAGNOSIS — I10 ESSENTIAL HYPERTENSION: ICD-10-CM

## 2023-01-04 DIAGNOSIS — E87.1 HYPONATREMIA: ICD-10-CM

## 2023-01-04 DIAGNOSIS — R60.1 GENERALIZED EDEMA: ICD-10-CM

## 2023-01-04 LAB
ANION GAP SERPL CALCULATED.3IONS-SCNC: 14 MMOL/L (ref 7–16)
BUN BLDV-MCNC: 6 MG/DL (ref 6–23)
CALCIUM SERPL-MCNC: 9.6 MG/DL (ref 8.6–10.2)
CHLORIDE BLD-SCNC: 104 MMOL/L (ref 98–107)
CO2: 21 MMOL/L (ref 22–29)
CREAT SERPL-MCNC: 0.7 MG/DL (ref 0.5–1)
GFR SERPL CREATININE-BSD FRML MDRD: >60 ML/MIN/1.73
GLUCOSE BLD-MCNC: 154 MG/DL (ref 74–99)
POTASSIUM SERPL-SCNC: 4.2 MMOL/L (ref 3.5–5)
SODIUM BLD-SCNC: 139 MMOL/L (ref 132–146)

## 2023-01-04 PROCEDURE — 36415 COLL VENOUS BLD VENIPUNCTURE: CPT | Performed by: FAMILY MEDICINE

## 2023-01-20 DIAGNOSIS — R60.1 GENERALIZED EDEMA: ICD-10-CM

## 2023-01-23 NOTE — TELEPHONE ENCOUNTER
The patient was requested to have a 1 week follow-up on December 31. Need to get her seen to determine what is going on with her blood pressure and her fluid retention. Please schedule her as soon as possible.

## 2023-01-30 ENCOUNTER — OFFICE VISIT (OUTPATIENT)
Dept: FAMILY MEDICINE CLINIC | Age: 83
End: 2023-01-30
Payer: COMMERCIAL

## 2023-01-30 VITALS
WEIGHT: 192 LBS | DIASTOLIC BLOOD PRESSURE: 78 MMHG | OXYGEN SATURATION: 97 % | TEMPERATURE: 97.9 F | HEART RATE: 95 BPM | SYSTOLIC BLOOD PRESSURE: 125 MMHG | HEIGHT: 63 IN | BODY MASS INDEX: 34.02 KG/M2 | RESPIRATION RATE: 16 BRPM

## 2023-01-30 DIAGNOSIS — E87.1 HYPONATREMIA: ICD-10-CM

## 2023-01-30 DIAGNOSIS — I10 ESSENTIAL HYPERTENSION: ICD-10-CM

## 2023-01-30 DIAGNOSIS — I48.91 ATRIAL FIBRILLATION, UNSPECIFIED TYPE (HCC): ICD-10-CM

## 2023-01-30 DIAGNOSIS — R60.1 GENERALIZED EDEMA: ICD-10-CM

## 2023-01-30 LAB
ANION GAP SERPL CALCULATED.3IONS-SCNC: 14 MMOL/L (ref 7–16)
BUN BLDV-MCNC: 8 MG/DL (ref 6–23)
CALCIUM SERPL-MCNC: 9.2 MG/DL (ref 8.6–10.2)
CHLORIDE BLD-SCNC: 101 MMOL/L (ref 98–107)
CO2: 22 MMOL/L (ref 22–29)
CREAT SERPL-MCNC: 0.6 MG/DL (ref 0.5–1)
GFR SERPL CREATININE-BSD FRML MDRD: >60 ML/MIN/1.73
GLUCOSE BLD-MCNC: 227 MG/DL (ref 74–99)
POTASSIUM SERPL-SCNC: 4.1 MMOL/L (ref 3.5–5)
SODIUM BLD-SCNC: 137 MMOL/L (ref 132–146)

## 2023-01-30 PROCEDURE — 3078F DIAST BP <80 MM HG: CPT

## 2023-01-30 PROCEDURE — 3074F SYST BP LT 130 MM HG: CPT

## 2023-01-30 PROCEDURE — 1123F ACP DISCUSS/DSCN MKR DOCD: CPT

## 2023-01-30 PROCEDURE — 99214 OFFICE O/P EST MOD 30 MIN: CPT

## 2023-01-30 RX ORDER — FUROSEMIDE 20 MG/1
20 TABLET ORAL EVERY MORNING
Qty: 30 TABLET | Refills: 0 | Status: SHIPPED | OUTPATIENT
Start: 2023-01-30

## 2023-01-30 RX ORDER — FUROSEMIDE 20 MG/1
TABLET ORAL
Qty: 30 TABLET | Refills: 0 | OUTPATIENT
Start: 2023-01-30

## 2023-01-30 ASSESSMENT — ENCOUNTER SYMPTOMS
DIARRHEA: 0
WHEEZING: 0
CONSTIPATION: 0
SHORTNESS OF BREATH: 0
VOMITING: 0
NAUSEA: 0

## 2023-01-30 ASSESSMENT — PATIENT HEALTH QUESTIONNAIRE - PHQ9
SUM OF ALL RESPONSES TO PHQ9 QUESTIONS 1 & 2: 0
SUM OF ALL RESPONSES TO PHQ QUESTIONS 1-9: 0
SUM OF ALL RESPONSES TO PHQ QUESTIONS 1-9: 0
2. FEELING DOWN, DEPRESSED OR HOPELESS: 0
SUM OF ALL RESPONSES TO PHQ QUESTIONS 1-9: 0
1. LITTLE INTEREST OR PLEASURE IN DOING THINGS: 0
SUM OF ALL RESPONSES TO PHQ QUESTIONS 1-9: 0

## 2023-01-30 NOTE — PROGRESS NOTES
[facility]  Department of Family Medicine  Family Medicine Residency Program      Patient: Gael Lang 81 y.o. female     Date of Service: 1/30/23      Chief complaint:   Chief Complaint   Patient presents with    Hypertension    Edema     By mid day she is swelling - does not take her furosemide until the afternoon        HISTORY OF PRESENTING ILLNESS     81 y.o. female PMH HTN, T2DM, GERD, HLD presented to the clinic for follow up. Patient was taken off of chlorthalidone due to hyponatremia, she then ate chips and salty foods to increase her sodium and reported increased swelling. She has been on Lasix 20 mg daily since late December. She reports her swelling has improved since then. She reports she takes the Lasix at different times every day. Her dose of irebesartan was increased to 300 mg once daily. She currently takes two of the 150 mg pills until she is out and will then take the 300 mg daily. Patient reports having more energy and does not need to take breaks during activity like she did last time she was swollen and in the office. She denies chest pain and shortness of breath. She reports she feels stronger. Health Maintenance: There are no preventive care reminders to display for this patient.   Past Medical History:      Diagnosis Date    Arthritis     Bilateral carpal tunnel syndrome     right more than left    Diabetes mellitus (HCC)     diet controlled & cinnamon    DM2 (diabetes mellitus, type 2) ([facility] 2/16/2012    patient states not diabetic, family hx of diabetes; A1C-6.2    Endometrial cancer ([address] 1992    early small cured    GERD (gastroesophageal reflux disease)     HTN (hypertension) 2/16/2012    Hyperlipidemia     borderline- no meds    Hypertension     controlled    Obesity 5/28/2013    Osteoarthritis     Osteoarthritis of right knee     [place]    Sinus drainage     seasonal (winter)    Spondylosis of lumbosacral joint     Voice absence     only with anxiety     Past Surgical History:        Procedure Laterality Date    CATARACT REMOVAL  2022    COLONOSCOPY  [address]    1 hyperplastic polyp 2mm    HYSTERECTOMY (CERVIX STATUS UNKNOWN)  1990's    AZALEA BSO    JOINT REPLACEMENT Right     Knee    OTHER SURGICAL HISTORY  03/11/2015    EGD with Biopsy    AZALEA AND BSO (CERVIX REMOVED)      1990's AZALEA and BSO    TONSILLECTOMY      age 13    TOTAL KNEE ARTHROPLASTY  02/2012    right    UPPER GASTROINTESTINAL ENDOSCOPY  2008     Allergies:    Penicillins, Aspirin, Losartan potassium-hctz, Omeprazole, Protonix [pantoprazole], and Cyclobenzaprine  Social History:   Social History     Socioeconomic History    Marital status:       Spouse name: Not on file    Number of children: 0    Years of education: 6    Highest education level: Associate degree: occupational, technical, or vocational program   Occupational History    Occupation: beautician   Tobacco Use    Smoking status: Never    Smokeless tobacco: Never   Vaping Use    Vaping Use: Never used   Substance and Sexual Activity    Alcohol use: No    Drug use: No    Sexual activity: Not Currently   Other Topics Concern    Not on file   Social History Narrative    Not on file     Social Determinants of Health     Financial Resource Strain: Low Risk     Difficulty of Paying Living Expenses: Not hard at all   Food Insecurity: No Food Insecurity    Worried About Running Out of Food in the Last Year: Never true    [address] [place] [address] Place of Food in the Last Year: Never true   Transportation Needs: Not on file   Physical Activity: Insufficiently Active    Days of Exercise per Week: 7 days    Minutes of Exercise per Session: 10 min   Stress: Not on file   Social Connections: Not on file   Intimate Partner Violence: Not on file   Housing Stability: Not on file      Family History:       Problem Relation Age of Onset    Diabetes Mother     Heart Disease Brother     Heart Disease Father      Review of Systems:   Review of Systems   Constitutional: Negative for chills and fever. Respiratory:  Negative for shortness of breath and wheezing. Cardiovascular:  Positive for leg swelling. Negative for chest pain. Gastrointestinal:  Negative for constipation, diarrhea, nausea and vomiting. Neurological:  Negative for dizziness, weakness and light-headedness. PHYSICAL EXAM   Vitals: /78   Pulse 95   Temp 97.9 °F (36.6 °C)   Resp 16   Ht 5' 3\" (1.6 m)   Wt 192 lb (87.1 kg)   SpO2 97%   BMI 34.01 kg/m²   Physical Exam  Constitutional:       General: She is not in acute distress. HENT:      Head: Normocephalic and atraumatic. Eyes:      Extraocular Movements: Extraocular movements intact. Cardiovascular:      Heart sounds: No murmur heard. No friction rub. No gallop. Pulmonary:      Breath sounds: Rales present. No wheezing or rhonchi. Comments: Rales RLL. Abdominal:      General: There is no distension. Tenderness: There is no abdominal tenderness. There is no guarding. Musculoskeletal:      Right lower leg: Edema present. Left lower leg: Edema present. Comments: +1 pitting edema bilateral lower extremities. ASSESSMENT AND PLAN     1. Generalized edema  - furosemide (LASIX) 20 MG tablet; Take 1 tablet by mouth every morning  Dispense: 30 tablet; Refill: 0  -Patient feeling improved overall. She is to go to the ED if her symptoms worsen or if she develops chest pain or shortness of breath.  -F/u 3/6/23 with Dr. Terald Schwab  -Echocardiogram    2. Hyponatremia, 3. Essential HTN, Stable  - Basic Metabolic Panel; Future  -Recheck BMP due to hyponatremia and Lasix treatment    4. Atrial fibrillation, unspecified type (HCC)  -Continue Eliquis 5 mg BID  -Continue metoprolol 50 mg BID      Counseled regarding above diagnosis, including possible risks and complications, especially if left uncontrolled.  Counseled regarding the possible side effects, risks, benefits and alternatives to treatment; patient and/or guardian verbalizes understanding, agrees, feels comfortable with and wishes to proceed with above treatment plan. Call or go to ED immediately if symptoms worsen or persist. Advised patient to call with any new medication issues, and, as applicable, read all Rx info from pharmacy to assure aware of all possible risks and side effects of medication before taking. Patient and/or guardian given opportunity to ask questions/raise concerns. The patient verbalized comfort and understanding of instructions. I encourage further reading and education about your health conditions. Information on many health conditions is provided by the American Academy of Family Physicians: https://familydoctor. org/  Please bring any questions to me at your next visit. Medication List:    Current Outpatient Medications   Medication Sig Dispense Refill    furosemide (LASIX) 20 MG tablet Take 1 tablet by mouth every morning 30 tablet 0    irbesartan (AVAPRO) 300 MG tablet Take 1 tablet by mouth daily 90 tablet 2    metoprolol succinate (TOPROL XL) 50 MG extended release tablet Take 1 tablet by mouth in the morning and at bedtime 180 tablet 2    apixaban (ELIQUIS) 5 MG TABS tablet Take 1 tablet by mouth 2 times daily 180 tablet 2    fluticasone (FLONASE) 50 MCG/ACT nasal spray 2 sprays by Each Nostril route daily 48 g 1    lansoprazole (PREVACID) 15 MG delayed release capsule Take 2 capsules by mouth daily 30-60min prior to your first meal 90 capsule 2    nystatin (MYCOSTATIN) 688595 UNIT/GM powder Apply 3 times daily for a week 60 g 1    vitamin B-12 (CYANOCOBALAMIN) 1000 MCG tablet Take 1,000 mcg by mouth daily      ELDERBERRY PO Take 100 mg by mouth daily      blood glucose monitor strips Test 2 times a day & as needed for symptoms of irregular blood glucose. Dispense sufficient amount for indicated testing frequency plus additional to accommodate PRN testing needs.  100 strip 5    FreeStyle Lancets MISC 1 each by Does not apply route 2 times daily 100 each 5    Alcohol Swabs PADS 1 each by Does not apply route 4 times daily 100 each 5    blood glucose monitor kit and supplies As per patients insurance coverage; please dispense sufficient amount for indicated testing 2 x daily plus additional to accommodate PRN testing needs. #90 day supply with one refill. Dispense all needed supplies to include: monitor, strips, lancing device, lancets, control solutions, alcohol swabs. 1 kit 0    Handicap Placard MISC by Does not apply route 1 each 0    vitamin C (ASCORBIC ACID) 500 MG tablet Take 1,000 mg by mouth 2 times daily      BETA CAROTENE PO Take 1 tablet by mouth daily      acetaminophen (APAP EXTRA STRENGTH) 500 MG tablet Take 1 tablet by mouth every 6 hours as needed for Pain 20 tablet 0    Coenzyme Q10 (COQ-10) 200 MG CAPS Take 1 capsule by mouth daily      CRANBERRY PO Take 2 tablets by mouth daily       Cinnamon 500 MG TABS Take 1 tablet by mouth daily       Multiple Vitamin (MULTIVITAMIN PO) Take 1 tablet by mouth daily        No current facility-administered medications for this visit. Return to Office: No follow-ups on file. This document may have been prepared at least partially through the use of voice recognition software. Although effort is taken to assure the accuracy of this document, it is possible that grammatical, syntax,  or spelling errors may occur.     Komal Way DO  Family Medicine Resident, PGY-1  [place]  1/30/2023 6:28 PM

## 2023-01-30 NOTE — Clinical Note
You are consistently under coding! You dealt with at least 2 chronic issues and prescribed a medicine.   audra

## 2023-01-30 NOTE — PROGRESS NOTES
S: 81 y.o. female with   Chief Complaint   Patient presents with    Hypertension    Edema     By mid day she is swelling - does not take her furosemide until the afternoon        Pt is here for BP follow up. Pt was trying to get her Na up and so ate a lot of chips. This made her swell up. She then was placed on lasix. She has an echo that is happening on Feb 10th. She is not taking the chlorthalidone. O: VS:  height is 5' 3\" (1.6 m) and weight is 192 lb (87.1 kg). Her temperature is 97.9 °F (36.6 °C). Her blood pressure is 125/78 and her pulse is 95. Her respiration is 16 and oxygen saturation is 97%. BP Readings from Last 3 Encounters:   01/30/23 125/78   12/29/22 (!) 134/95   12/05/22 122/67     See resident note      Impression/Plan:   1) HTN - well controlled. Cont on current meds. BMP today. 2) afib - pt takes her meds regularrly. SOB is improved. 3) generalized edema - cont on lasix. Script given. There are no preventive care reminders to display for this patient. Attending Physician Statement  I have discussed the case, including pertinent history and exam findings with the resident. I agree with the documented assessment and plan.       Jaycob Irizarry MD

## 2023-02-01 ENCOUNTER — TELEPHONE (OUTPATIENT)
Dept: FAMILY MEDICINE CLINIC | Age: 83
End: 2023-02-01

## 2023-02-01 NOTE — TELEPHONE ENCOUNTER
Last Appointment   1/30/2023  Next Appointment  3/6/2023  Patient called in regarding her lansoprazole, she stated she called the pharmacy and they would not fill it for her, I called the pharmacy, they stated she picked this up on 1-22-23 and that her insurance would not cover it until then. When I called patient back she said she did not  meds that day. I advised her that someone picked up and that the insurance would not cover it again until 2--18-23.

## 2023-02-10 ENCOUNTER — HOSPITAL ENCOUNTER (OUTPATIENT)
Dept: NON INVASIVE DIAGNOSTICS | Age: 83
Discharge: HOME | End: 2023-02-10
Payer: COMMERCIAL

## 2023-02-10 DIAGNOSIS — R60.1 GENERALIZED EDEMA: ICD-10-CM

## 2023-02-10 LAB
LV EF: 53 %
LVEF MODALITY: NORMAL

## 2023-02-10 PROCEDURE — 93306 TTE W/DOPPLER COMPLETE: CPT

## 2023-02-17 ENCOUNTER — TELEPHONE (OUTPATIENT)
Dept: FAMILY MEDICINE CLINIC | Age: 83
End: 2023-02-17

## 2023-02-17 NOTE — TELEPHONE ENCOUNTER
Patient is looking for the results of her recent 2 D echo please. She may be unavailable, but leave message on her voicemail.

## 2023-02-20 ENCOUNTER — TELEPHONE (OUTPATIENT)
Dept: FAMILY MEDICINE CLINIC | Age: 83
End: 2023-02-20

## 2023-02-20 NOTE — TELEPHONE ENCOUNTER
Patient is calling again about Dr. Asaf Alston giving her results of her 2 D Echo. Also, patient took Valsartan 80 mg today and wonders if she should still be taking it.

## 2023-02-20 NOTE — TELEPHONE ENCOUNTER
Pt has questions about medications and would like someone to tell her what she is supposed to be taking right now. . states she puts them in a pill box weekly and must have forgotten one and doesn't know what she is supposed to be taking at this time

## 2023-02-21 ENCOUNTER — TELEPHONE (OUTPATIENT)
Dept: ADMINISTRATIVE | Age: 83
End: 2023-02-21

## 2023-02-21 NOTE — TELEPHONE ENCOUNTER
Echo results reviewed with patient.  She will reach out to cardiology to request an appointment    Also reviewed current medications and confirmed patient is not to be taking the Valsartan

## 2023-02-21 NOTE — TELEPHONE ENCOUNTER
Pt calling at the request of Dr. Mariajose Puente to schedule an OV with Dr. Shree Treviño re: abnormal echo from 2/10/23. Last seen in [address] by [address] on: 11/1/22. Please return her call with further advise/apt.

## 2023-02-23 DIAGNOSIS — R60.1 GENERALIZED EDEMA: ICD-10-CM

## 2023-02-23 RX ORDER — FUROSEMIDE 20 MG/1
TABLET ORAL
Qty: 30 TABLET | Refills: 0 | Status: SHIPPED | OUTPATIENT
Start: 2023-02-23

## 2023-03-06 ENCOUNTER — OFFICE VISIT (OUTPATIENT)
Dept: FAMILY MEDICINE CLINIC | Age: 83
End: 2023-03-06
Payer: COMMERCIAL

## 2023-03-06 VITALS
OXYGEN SATURATION: 96 % | HEART RATE: 91 BPM | HEIGHT: 63 IN | DIASTOLIC BLOOD PRESSURE: 67 MMHG | SYSTOLIC BLOOD PRESSURE: 128 MMHG | WEIGHT: 194 LBS | RESPIRATION RATE: 16 BRPM | TEMPERATURE: 97.5 F | BODY MASS INDEX: 34.38 KG/M2

## 2023-03-06 DIAGNOSIS — R60.1 GENERALIZED EDEMA: ICD-10-CM

## 2023-03-06 DIAGNOSIS — E11.9 TYPE 2 DIABETES MELLITUS WITHOUT COMPLICATION, WITHOUT LONG-TERM CURRENT USE OF INSULIN (HCC): Primary | ICD-10-CM

## 2023-03-06 DIAGNOSIS — I10 ESSENTIAL HYPERTENSION: ICD-10-CM

## 2023-03-06 LAB — HBA1C MFR BLD: 6.4 %

## 2023-03-06 PROCEDURE — 3074F SYST BP LT 130 MM HG: CPT | Performed by: FAMILY MEDICINE

## 2023-03-06 PROCEDURE — 3044F HG A1C LEVEL LT 7.0%: CPT | Performed by: FAMILY MEDICINE

## 2023-03-06 PROCEDURE — 99214 OFFICE O/P EST MOD 30 MIN: CPT | Performed by: FAMILY MEDICINE

## 2023-03-06 PROCEDURE — 83036 HEMOGLOBIN GLYCOSYLATED A1C: CPT | Performed by: FAMILY MEDICINE

## 2023-03-06 PROCEDURE — 1123F ACP DISCUSS/DSCN MKR DOCD: CPT | Performed by: FAMILY MEDICINE

## 2023-03-06 PROCEDURE — 3078F DIAST BP <80 MM HG: CPT | Performed by: FAMILY MEDICINE

## 2023-03-06 RX ORDER — FUROSEMIDE 20 MG/1
20 TABLET ORAL 2 TIMES DAILY
Qty: 180 TABLET | Refills: 0 | Status: SHIPPED | OUTPATIENT
Start: 2023-03-06

## 2023-03-06 SDOH — ECONOMIC STABILITY: FOOD INSECURITY: WITHIN THE PAST 12 MONTHS, YOU WORRIED THAT YOUR FOOD WOULD RUN OUT BEFORE YOU GOT MONEY TO BUY MORE.: NEVER TRUE

## 2023-03-06 SDOH — ECONOMIC STABILITY: INCOME INSECURITY: HOW HARD IS IT FOR YOU TO PAY FOR THE VERY BASICS LIKE FOOD, HOUSING, MEDICAL CARE, AND HEATING?: NOT HARD AT ALL

## 2023-03-06 SDOH — ECONOMIC STABILITY: FOOD INSECURITY: WITHIN THE PAST 12 MONTHS, THE FOOD YOU BOUGHT JUST DIDN'T LAST AND YOU DIDN'T HAVE MONEY TO GET MORE.: NEVER TRUE

## 2023-03-06 ASSESSMENT — ENCOUNTER SYMPTOMS
CHEST TIGHTNESS: 0
SHORTNESS OF BREATH: 0
ABDOMINAL PAIN: 0

## 2023-03-06 NOTE — PROGRESS NOTES
[facility]  Family Medicine Residency  Phone: [phone]  Fax: [phone]    Patient:  Ada Carrillo 81 y.o. female                                 Date of Service: 3/6/23                            Chiefcomplaint:   Chief Complaint   Patient presents with    Diabetes    Hypertension    Gastroesophageal Reflux         History of Present Illness: The patient is a 81 y.o. female  presented to the clinic with complaints as above.    edema - EF 50-55% and indeterminate diastolic function with severe mitral annular calcification. She reports weakness \"all over\" and throughout the day. She is not specifically reporting dyspnea. She does sleep on 2 pillows but this has not changed. She has ongonig edema in the legs. She has cardio appt upcoming at the end of the month. Htn -some confusion on meds. Pharmacy just refilled her chlorthalidone (stopped since Dec) and irbesartan 150 (replaced with 300mg dose in Dec) and furosemide. She is not having significant Cv sx and only reports edema. Diabetes-no reported hyper or hypoglycemia. A1c is controlled today. She is currently diet controlled. Review of Systems:   Review of Systems   Respiratory:  Negative for chest tightness and shortness of breath. Cardiovascular:  Positive for leg swelling. Negative for chest pain and palpitations. Gastrointestinal:  Negative for abdominal pain.      Past Medical History:      Diagnosis Date    Arthritis     Bilateral carpal tunnel syndrome     right more than left    Diabetes mellitus (HCC)     diet controlled & cinnamon    DM2 (diabetes mellitus, type 2) ([facility] 2/16/2012    patient states not diabetic, family hx of diabetes; A1C-6.2    Endometrial cancer ([facility] 1992    early small cured    GERD (gastroesophageal reflux disease)     HTN (hypertension) 2/16/2012    Hyperlipidemia     borderline- no meds    Hypertension     controlled    Obesity 5/28/2013    Osteoarthritis Osteoarthritis of right knee     [place]    Sinus drainage     seasonal (winter)    Spondylosis of lumbosacral joint     Voice absence     only with anxiety       Past Surgical History:        Procedure Laterality Date    CATARACT REMOVAL  2022    COLONOSCOPY  [address]    1 hyperplastic polyp 2mm    HYSTERECTOMY (CERVIX STATUS UNKNOWN)  1990's    AZALEA BSO    JOINT REPLACEMENT Right     Knee    OTHER SURGICAL HISTORY  03/11/2015    EGD with Biopsy    AZALEA AND BSO (CERVIX REMOVED)      1990's AZALEA and BSO    TONSILLECTOMY      age 13    TOTAL KNEE ARTHROPLASTY  02/2012    right    UPPER GASTROINTESTINAL ENDOSCOPY  2008       Allergies:    Penicillins, Aspirin, Losartan potassium-hctz, Omeprazole, Protonix [pantoprazole], and Cyclobenzaprine    Social History:   Social History     Socioeconomic History    Marital status:      Spouse name: Not on file    Number of children: 0    Years of education: 6    Highest education level: Associate degree: occupational, technical, or vocational program   Occupational History    Occupation: beautician   Tobacco Use    Smoking status: Never    Smokeless tobacco: Never   Vaping Use    Vaping Use: Never used   Substance and Sexual Activity    Alcohol use: No    Drug use: No    Sexual activity: Not Currently   Other Topics Concern    Not on file   Social History Narrative    Not on file     Social Determinants of Health     Financial Resource Strain: Low Risk     Difficulty of Paying Living Expenses: Not hard at all   Food Insecurity: No Food Insecurity    Worried About Running Out of Food in the Last Year: Never true    [address] in the Last Year: Never true   Transportation Needs: Unknown    Lack of Transportation (Medical): Not on file    Lack of Transportation (Non-Medical):  No   Physical Activity: Insufficiently Active    Days of Exercise per Week: 7 days    Minutes of Exercise per Session: 10 min   Stress: Not on file   Social Connections: Not on file   Intimate Partner Violence: Not on file   Housing Stability: Unknown    Unable to Pay for Housing in the Last Year: Not on file    Number of Places Lived in the Last Year: Not on file    Unstable Housing in the Last Year: No        Family History:       Problem Relation Age of Onset    Diabetes Mother     Heart Disease Brother     Heart Disease Father        Physical Exam:    Vitals: /67   Pulse 91   Temp 97.5 °F (36.4 °C)   Resp 16   Ht 5' 3\" (1.6 m)   Wt 194 lb (88 kg)   SpO2 96%   BMI 34.37 kg/m²   BP Readings from Last 3 Encounters:   03/06/23 128/67   01/30/23 125/78   12/29/22 (!) 134/95     General Appearance: Well developed, awake, alert, oriented, no acute distress  Chest wall/Lung: Clear to auscultation bilaterally,  respirations unlabored. No ronchi/wheezing/rales  Heart[de-identified]  Irregular rate and rhythm, S1and S2 normal, no murmur, rub or gallop. Extremities:  Extremities normal, atraumatic, no cyanosis. +2 edema. Neurologic:   Alert&Oriented. Normal gait and coordination  No focal neurological deficits appreaciated         Psychiatric: has a normal mood and affect. Behavior is normal.       Assessment and Plan:         1. Type 2 diabetes mellitus without complication, without long-term current use of insulin ([address]  Patient is currently diet controlled continue to watch diet avoid concentrated sweets and keep as active as possible. - POCT glycosylated hemoglobin (Hb A1C)    2. Generalized edema  This most likely is HFpEF I will increase diuresis to Lasix twice daily. I attempted to refine her antihypertensives today she recently had chlorthalidone refilled this was discontinued in December she had a lower than expected dose of irbesartan refilled we clarified that she should be on the 300 mg dose today. I am going to try to see her back with pill bottles present to ensure that we have an accurate list of what she has been taking prior to her cardiology visit at the end of the month.   - furosemide (LASIX) 20 MG tablet; Take 1 tablet by mouth 2 times daily  Dispense: 180 tablet; Refill: 0    3. Essential hypertension  Clarified with the patient what pill she should be taking today her med list on discharge from this visit is accurate my MA has called the pharmacy and canceled her prescriptions for chlorthalidone and the 150 mg dose of the irbesartan as she is to be taking 300 mg of that. Presuming this is what she has been taking her blood pressure is controlled today we will try to see her back in 2 weeks to ensure that her med list and pills are consistent prior to her cardiology visit. Return to Office: Return in about 2 weeks (around 3/20/2023) for edema. I encourage further reading and education about your health conditions. Information on many healthconditions is provided by the American Academy of Family Physicians: https://familydoctor. org/  Please bring any questions to me at your next visit. This document may have been prepared at least partiallythrough the use of voice recognition software. Although effort is taken to assure the accuracy of this document, it is possible that grammatical, syntax,  or spelling errors may occur.     Medication List:    Current Outpatient Medications   Medication Sig Dispense Refill    furosemide (LASIX) 20 MG tablet Take 1 tablet by mouth 2 times daily 180 tablet 0    irbesartan (AVAPRO) 300 MG tablet Take 1 tablet by mouth daily 90 tablet 2    metoprolol succinate (TOPROL XL) 50 MG extended release tablet Take 1 tablet by mouth in the morning and at bedtime 180 tablet 2    apixaban (ELIQUIS) 5 MG TABS tablet Take 1 tablet by mouth 2 times daily 180 tablet 2    fluticasone (FLONASE) 50 MCG/ACT nasal spray 2 sprays by Each Nostril route daily 48 g 1    lansoprazole (PREVACID) 15 MG delayed release capsule Take 2 capsules by mouth daily 30-60min prior to your first meal 90 capsule 2    nystatin (MYCOSTATIN) 875647 UNIT/GM powder Apply 3 times daily for a week 60 g 1 vitamin B-12 (CYANOCOBALAMIN) 1000 MCG tablet Take 1,000 mcg by mouth daily      ELDERBERRY PO Take 100 mg by mouth daily      blood glucose monitor strips Test 2 times a day & as needed for symptoms of irregular blood glucose. Dispense sufficient amount for indicated testing frequency plus additional to accommodate PRN testing needs. 100 strip 5    FreeStyle Lancets MISC 1 each by Does not apply route 2 times daily 100 each 5    Alcohol Swabs PADS 1 each by Does not apply route 4 times daily 100 each 5    blood glucose monitor kit and supplies As per patients insurance coverage; please dispense sufficient amount for indicated testing 2 x daily plus additional to accommodate PRN testing needs. #90 day supply with one refill. Dispense all needed supplies to include: monitor, strips, lancing device, lancets, control solutions, alcohol swabs. 1 kit 0    Handicap Placard MISC by Does not apply route 1 each 0    vitamin C (ASCORBIC ACID) 500 MG tablet Take 1,000 mg by mouth 2 times daily      BETA CAROTENE PO Take 1 tablet by mouth daily      acetaminophen (APAP EXTRA STRENGTH) 500 MG tablet Take 1 tablet by mouth every 6 hours as needed for Pain 20 tablet 0    Coenzyme Q10 (COQ-10) 200 MG CAPS Take 1 capsule by mouth daily      CRANBERRY PO Take 2 tablets by mouth daily       Cinnamon 500 MG TABS Take 1 tablet by mouth daily       Multiple Vitamin (MULTIVITAMIN PO) Take 1 tablet by mouth daily        No current facility-administered medications for this visit.         Lulu Guerrero MD

## 2023-03-20 ENCOUNTER — OFFICE VISIT (OUTPATIENT)
Dept: FAMILY MEDICINE CLINIC | Age: 83
End: 2023-03-20
Payer: COMMERCIAL

## 2023-03-20 VITALS
TEMPERATURE: 98.3 F | WEIGHT: 191 LBS | OXYGEN SATURATION: 94 % | SYSTOLIC BLOOD PRESSURE: 120 MMHG | HEART RATE: 76 BPM | DIASTOLIC BLOOD PRESSURE: 68 MMHG | RESPIRATION RATE: 16 BRPM | BODY MASS INDEX: 33.84 KG/M2 | HEIGHT: 63 IN

## 2023-03-20 DIAGNOSIS — I10 ESSENTIAL HYPERTENSION: ICD-10-CM

## 2023-03-20 DIAGNOSIS — R60.1 GENERALIZED EDEMA: Primary | ICD-10-CM

## 2023-03-20 PROCEDURE — 3074F SYST BP LT 130 MM HG: CPT | Performed by: FAMILY MEDICINE

## 2023-03-20 PROCEDURE — 99213 OFFICE O/P EST LOW 20 MIN: CPT | Performed by: FAMILY MEDICINE

## 2023-03-20 PROCEDURE — 1123F ACP DISCUSS/DSCN MKR DOCD: CPT | Performed by: FAMILY MEDICINE

## 2023-03-20 PROCEDURE — 3078F DIAST BP <80 MM HG: CPT | Performed by: FAMILY MEDICINE

## 2023-03-20 ASSESSMENT — ENCOUNTER SYMPTOMS
SHORTNESS OF BREATH: 0
CHEST TIGHTNESS: 0

## 2023-03-20 NOTE — PROGRESS NOTES
delayed release capsule Take 2 capsules by mouth daily 30-60min prior to your first meal 90 capsule 2    nystatin (MYCOSTATIN) 199805 UNIT/GM powder Apply 3 times daily for a week 60 g 1    vitamin B-12 (CYANOCOBALAMIN) 1000 MCG tablet Take 1,000 mcg by mouth daily      ELDERBERRY PO Take 100 mg by mouth daily      blood glucose monitor strips Test 2 times a day & as needed for symptoms of irregular blood glucose. Dispense sufficient amount for indicated testing frequency plus additional to accommodate PRN testing needs. 100 strip 5    FreeStyle Lancets MISC 1 each by Does not apply route 2 times daily 100 each 5    Alcohol Swabs PADS 1 each by Does not apply route 4 times daily 100 each 5    blood glucose monitor kit and supplies As per patients insurance coverage; please dispense sufficient amount for indicated testing 2 x daily plus additional to accommodate PRN testing needs. #90 day supply with one refill. Dispense all needed supplies to include: monitor, strips, lancing device, lancets, control solutions, alcohol swabs. 1 kit 0    Handicap Placard MISC by Does not apply route 1 each 0    BETA CAROTENE PO Take 1 tablet by mouth daily      acetaminophen (APAP EXTRA STRENGTH) 500 MG tablet Take 1 tablet by mouth every 6 hours as needed for Pain 20 tablet 0    CRANBERRY PO Take 2 tablets by mouth daily       Cinnamon 500 MG TABS Take 1 tablet by mouth daily       Multiple Vitamin (MULTIVITAMIN PO) Take 1 tablet by mouth daily        No current facility-administered medications for this visit.         Maureen Mcguire MD

## 2023-03-30 ENCOUNTER — OFFICE VISIT (OUTPATIENT)
Dept: CARDIOLOGY CLINIC | Age: 83
End: 2023-03-30
Payer: COMMERCIAL

## 2023-03-30 VITALS
HEART RATE: 92 BPM | RESPIRATION RATE: 16 BRPM | HEIGHT: 63 IN | DIASTOLIC BLOOD PRESSURE: 62 MMHG | SYSTOLIC BLOOD PRESSURE: 132 MMHG | WEIGHT: 191.7 LBS | BODY MASS INDEX: 33.96 KG/M2 | OXYGEN SATURATION: 97 %

## 2023-03-30 DIAGNOSIS — I51.7 BIATRIAL ENLARGEMENT: Primary | ICD-10-CM

## 2023-03-30 DIAGNOSIS — I10 ESSENTIAL HYPERTENSION: ICD-10-CM

## 2023-03-30 DIAGNOSIS — I48.21 PERMANENT ATRIAL FIBRILLATION (HCC): ICD-10-CM

## 2023-03-30 PROCEDURE — 3078F DIAST BP <80 MM HG: CPT | Performed by: INTERNAL MEDICINE

## 2023-03-30 PROCEDURE — 93000 ELECTROCARDIOGRAM COMPLETE: CPT | Performed by: INTERNAL MEDICINE

## 2023-03-30 PROCEDURE — 3075F SYST BP GE 130 - 139MM HG: CPT | Performed by: INTERNAL MEDICINE

## 2023-03-30 PROCEDURE — 99214 OFFICE O/P EST MOD 30 MIN: CPT | Performed by: INTERNAL MEDICINE

## 2023-03-30 PROCEDURE — 1123F ACP DISCUSS/DSCN MKR DOCD: CPT | Performed by: INTERNAL MEDICINE

## 2023-03-30 NOTE — PROGRESS NOTES
file   Social History Narrative    Drinks 2 cups of coffee daily     Social Determinants of Health     Financial Resource Strain: Low Risk     Difficulty of Paying Living Expenses: Not hard at all   Food Insecurity: No Food Insecurity    Worried About [address] in the Last Year: Never true    [facility] in the Last Year: Never true   Transportation Needs: Unknown    Lack of Transportation (Medical): Not on file    Lack of Transportation (Non-Medical):  No   Physical Activity: Insufficiently Active    Days of Exercise per Week: 7 days    Minutes of Exercise per Session: 10 min   Stress: Not on file   Social Connections: Not on file   Intimate Partner Violence: Not on file   Housing Stability: Unknown    Unable to Pay for Housing in the Last Year: Not on file    Number of Places Lived in the Last Year: Not on file    Unstable Housing in the Last Year: No       Family History   Problem Relation Age of Onset    Diabetes Mother     Heart Disease Father     Heart Disease Brother        Past Medical History:   Diagnosis Date    Afib ([facility]     Arthritis     Bilateral carpal tunnel syndrome     right more than left    Diabetes mellitus ([facility]     diet controlled & cinnamon    DM2 (diabetes mellitus, type 2) ([facility] 02/16/2012    patient states not diabetic, family hx of diabetes; A1C-6.2    Endometrial cancer ([facility] 1992    early small cured    GERD (gastroesophageal reflux disease)     HTN (hypertension) 02/16/2012    Hyperlipidemia     borderline- no meds    Hypertension     controlled    Obesity 05/28/2013    Osteoarthritis     Osteoarthritis of right knee     [place]    Sinus drainage     seasonal (winter)    Spondylosis of lumbosacral joint     Voice absence     only with anxiety       PHYSICAL EXAM:  CONSTITUTIONAL:  Well developed, well nourished    Vitals:    03/30/23 0912   BP: 132/62   Site: Right Upper Arm   Position: Sitting   Cuff Size: Medium Adult   Pulse: 92   Resp: 16   SpO2: 97%   Weight: 191 lb

## 2023-04-02 DIAGNOSIS — K21.9 GASTROESOPHAGEAL REFLUX DISEASE WITHOUT ESOPHAGITIS: ICD-10-CM

## 2023-04-03 DIAGNOSIS — R60.1 GENERALIZED EDEMA: ICD-10-CM

## 2023-04-03 RX ORDER — LANSOPRAZOLE 15 MG/1
30 CAPSULE, DELAYED RELEASE ORAL DAILY
Qty: 90 CAPSULE | Refills: 2 | Status: SHIPPED | OUTPATIENT
Start: 2023-04-03

## 2023-04-04 RX ORDER — FUROSEMIDE 20 MG/1
TABLET ORAL
Qty: 180 TABLET | Refills: 0 | OUTPATIENT
Start: 2023-04-04

## 2023-04-05 ENCOUNTER — TELEPHONE (OUTPATIENT)
Dept: FAMILY MEDICINE CLINIC | Age: 83
End: 2023-04-05

## 2023-04-05 NOTE — TELEPHONE ENCOUNTER
Patient called in regarding the Lansoprazole, concerned about the side effects and wanted to know if she should take this only for a short term? Or only take 1 pill a day?

## 2023-04-05 NOTE — TELEPHONE ENCOUNTER
Benefits of continued therapy outweigh risks recommend continue. We can discuss this a little bit further at her next visit or she can schedule a visit to discuss a little bit more in depth.

## 2023-05-04 ENCOUNTER — TELEPHONE (OUTPATIENT)
Dept: FAMILY MEDICINE CLINIC | Age: 83
End: 2023-05-04

## 2023-05-04 NOTE — TELEPHONE ENCOUNTER
Paxlovid is best used as early as possible and it is used to help prevent admission to the hospital.  It will not take away her cough or her symptoms she may still use normal medicines such as over-the-counter's for symptom control. Her age and health risks do qualify her to use Paxlovid. Generally it is well-tolerated however it can have some gastrointestinal effects including nausea or constipation or diarrhea. If the patient chooses to take Paxlovid I will need her to cut her Eliquis in half for the days that she uses this medicine. If her symptoms worsen please have her be seen for treatment. Please monitor carefully for bleeding due to the interaction of Paxlovid with her Eliquis which hopefully will be addressed by cutting the Eliquis in half for the days that she is on Paxlovid medicine. These use the teach back method and make sure the patient is able to vocalize the reduction of her Eliquis and that she is aware of what pill this is she is likely to have the pharmacist warned her about this interaction when she goes to the pharmacy and they will need to know that we have addressed this.

## 2023-05-04 NOTE — TELEPHONE ENCOUNTER
Patient has been ill for 3-4 days and today had a positive home test for Covid. She wonders if doctor needs to call in Paxlovid for her. Her symptoms are only a very productive cough. She does have Mucinex at home.

## 2023-05-04 NOTE — TELEPHONE ENCOUNTER
Spoke with patient; she is feeling a little better and has decided not to take the Paxlovid. She will continue her OTC meds and fluids. I called the pharmacy and LVM to cancel script. Patient was advised to call us if she gets worse or needs anything further.

## 2023-05-17 ENCOUNTER — HOSPITAL ENCOUNTER (OUTPATIENT)
Age: 83
Discharge: HOME | End: 2023-05-17
Payer: MEDICARE

## 2023-05-17 DIAGNOSIS — I10 ESSENTIAL HYPERTENSION: Primary | Chronic | ICD-10-CM

## 2023-05-17 DIAGNOSIS — I10 ESSENTIAL HYPERTENSION: Chronic | ICD-10-CM

## 2023-05-17 LAB
ANION GAP SERPL CALCULATED.3IONS-SCNC: 11 MMOL/L (ref 7–16)
BUN SERPL-MCNC: 11 MG/DL (ref 6–23)
CALCIUM SERPL-MCNC: 9.4 MG/DL (ref 8.6–10.2)
CHLORIDE SERPL-SCNC: 103 MMOL/L (ref 98–107)
CO2 SERPL-SCNC: 26 MMOL/L (ref 22–29)
CREAT SERPL-MCNC: 0.6 MG/DL (ref 0.5–1)
GLUCOSE SERPL-MCNC: 152 MG/DL (ref 74–99)
POTASSIUM SERPL-SCNC: 3.7 MMOL/L (ref 3.5–5)
SODIUM SERPL-SCNC: 140 MMOL/L (ref 132–146)

## 2023-05-17 PROCEDURE — 80048 BASIC METABOLIC PNL TOTAL CA: CPT

## 2023-05-17 PROCEDURE — 36415 COLL VENOUS BLD VENIPUNCTURE: CPT

## 2023-05-25 ENCOUNTER — HOSPITAL ENCOUNTER (OUTPATIENT)
Dept: MRI IMAGING | Age: 83
Discharge: HOME | End: 2023-05-27

## 2023-05-25 DIAGNOSIS — I51.7 BIATRIAL ENLARGEMENT: ICD-10-CM

## 2023-05-26 ENCOUNTER — TELEPHONE (OUTPATIENT)
Dept: CARDIOLOGY CLINIC | Age: 83
End: 2023-05-26

## 2023-05-26 DIAGNOSIS — R07.2 PRECORDIAL PAIN: Primary | ICD-10-CM

## 2023-05-30 NOTE — TELEPHONE ENCOUNTER
Patient returned call, she would like to speak to office. She wants to know about open MRI. Please call her.  Thanks

## 2023-05-31 NOTE — TELEPHONE ENCOUNTER
Patient was notified and instructed they do not have open MRI for cardiac. She does not want to wait 2-3 months to see you if there is something wrong with her heart.       Please advise

## 2023-06-03 DIAGNOSIS — R60.1 GENERALIZED EDEMA: ICD-10-CM

## 2023-06-03 DIAGNOSIS — K21.9 GASTROESOPHAGEAL REFLUX DISEASE WITHOUT ESOPHAGITIS: ICD-10-CM

## 2023-06-05 ENCOUNTER — HOSPITAL ENCOUNTER (OUTPATIENT)
Age: 83
Discharge: HOME | End: 2023-06-05
Payer: MEDICARE

## 2023-06-05 DIAGNOSIS — R07.2 PRECORDIAL PAIN: ICD-10-CM

## 2023-06-05 PROCEDURE — 36415 COLL VENOUS BLD VENIPUNCTURE: CPT

## 2023-06-05 PROCEDURE — 84165 PROTEIN E-PHORESIS SERUM: CPT

## 2023-06-05 PROCEDURE — 83883 ASSAY NEPHELOMETRY NOT SPEC: CPT

## 2023-06-05 PROCEDURE — 86334 IMMUNOFIX E-PHORESIS SERUM: CPT

## 2023-06-05 RX ORDER — BENZONATATE 100 MG/1
CAPSULE ORAL
Qty: 30 CAPSULE | Refills: 0 | OUTPATIENT
Start: 2023-06-05

## 2023-06-05 RX ORDER — MECOBALAMIN 5000 MCG
30 TABLET,DISINTEGRATING ORAL DAILY
Qty: 180 CAPSULE | Refills: 1 | OUTPATIENT
Start: 2023-06-05

## 2023-06-06 RX ORDER — FUROSEMIDE 20 MG/1
TABLET ORAL
Qty: 180 TABLET | Refills: 0 | Status: SHIPPED | OUTPATIENT
Start: 2023-06-06

## 2023-06-07 LAB
ALBUMIN SERPL-MCNC: 3 G/DL (ref 3.5–4.7)
ALPHA1 GLOB SERPL ELPH-MCNC: 0.2 G/DL (ref 0.2–0.4)
ALPHA2 GLOB SERPL ELPH-MCNC: 0.8 G/DL (ref 0.5–1)
B-GLOBULIN SERPL ELPH-MCNC: 1 G/DL (ref 0.8–1.3)
DEPRECATED KAPPA LC FREE/LAMBDA SER: 1.65 {RATIO} (ref 0.26–1.65)
ELECTROPHORESIS: ABNORMAL
GAMMA GLOB SERPL ELPH-MCNC: 1.7 G/DL (ref 0.7–1.6)
IMMUNOFIXATION RESULT, SERUM: NORMAL
KAPPA LC FREE SER-MCNC: 48.03 MG/L (ref 3.3–19.4)
LAMBDA LC FREE SERPL-MCNC: 29.07 MG/L (ref 5.71–26.3)
PROT SERPL-MCNC: 6.8 G/DL (ref 6.4–8.3)

## 2023-06-09 ENCOUNTER — TELEPHONE (OUTPATIENT)
Dept: CARDIOLOGY CLINIC | Age: 83
End: 2023-06-09

## 2023-06-09 NOTE — TELEPHONE ENCOUNTER
----- Message from Vicki Zhu MD sent at 6/9/2023  2:07 PM EDT -----  Please let patient know:  labs consistent with inflammation. However heart enlargement appears secondary to valve issues/A fib. Same medications with follow up in one year.

## 2023-06-22 ENCOUNTER — OFFICE VISIT (OUTPATIENT)
Dept: FAMILY MEDICINE CLINIC | Age: 83
End: 2023-06-22
Payer: MEDICARE

## 2023-06-22 VITALS
OXYGEN SATURATION: 95 % | WEIGHT: 193 LBS | RESPIRATION RATE: 16 BRPM | TEMPERATURE: 98.2 F | HEIGHT: 63 IN | BODY MASS INDEX: 34.2 KG/M2 | DIASTOLIC BLOOD PRESSURE: 58 MMHG | SYSTOLIC BLOOD PRESSURE: 96 MMHG | HEART RATE: 82 BPM

## 2023-06-22 DIAGNOSIS — I10 ESSENTIAL HYPERTENSION: ICD-10-CM

## 2023-06-22 DIAGNOSIS — Z00.00 MEDICARE ANNUAL WELLNESS VISIT, SUBSEQUENT: Primary | ICD-10-CM

## 2023-06-22 PROCEDURE — 3078F DIAST BP <80 MM HG: CPT | Performed by: FAMILY MEDICINE

## 2023-06-22 PROCEDURE — 3074F SYST BP LT 130 MM HG: CPT | Performed by: FAMILY MEDICINE

## 2023-06-22 PROCEDURE — G0439 PPPS, SUBSEQ VISIT: HCPCS | Performed by: FAMILY MEDICINE

## 2023-06-22 PROCEDURE — 1123F ACP DISCUSS/DSCN MKR DOCD: CPT | Performed by: FAMILY MEDICINE

## 2023-06-22 RX ORDER — IRBESARTAN 150 MG/1
150 TABLET ORAL DAILY
Qty: 90 TABLET | Refills: 1 | Status: SHIPPED | OUTPATIENT
Start: 2023-06-22

## 2023-06-22 ASSESSMENT — PATIENT HEALTH QUESTIONNAIRE - PHQ9
1. LITTLE INTEREST OR PLEASURE IN DOING THINGS: 0
2. FEELING DOWN, DEPRESSED OR HOPELESS: 0
SUM OF ALL RESPONSES TO PHQ QUESTIONS 1-9: 0
SUM OF ALL RESPONSES TO PHQ9 QUESTIONS 1 & 2: 0

## 2023-06-22 ASSESSMENT — LIFESTYLE VARIABLES
HOW OFTEN DO YOU HAVE A DRINK CONTAINING ALCOHOL: MONTHLY OR LESS
HOW MANY STANDARD DRINKS CONTAINING ALCOHOL DO YOU HAVE ON A TYPICAL DAY: 1 OR 2

## 2023-06-26 DIAGNOSIS — K21.9 GASTROESOPHAGEAL REFLUX DISEASE WITHOUT ESOPHAGITIS: ICD-10-CM

## 2023-06-26 RX ORDER — MECOBALAMIN 5000 MCG
30 TABLET,DISINTEGRATING ORAL DAILY
Qty: 180 CAPSULE | Refills: 1 | OUTPATIENT
Start: 2023-06-26

## 2023-06-28 DIAGNOSIS — K21.9 GASTROESOPHAGEAL REFLUX DISEASE WITHOUT ESOPHAGITIS: ICD-10-CM

## 2023-06-29 RX ORDER — MECOBALAMIN 5000 MCG
30 TABLET,DISINTEGRATING ORAL DAILY
Qty: 180 CAPSULE | Refills: 1 | OUTPATIENT
Start: 2023-06-29

## 2023-07-01 DIAGNOSIS — R60.1 GENERALIZED EDEMA: ICD-10-CM

## 2023-07-01 DIAGNOSIS — I48.91 ATRIAL FIBRILLATION, UNSPECIFIED TYPE (HCC): ICD-10-CM

## 2023-07-03 RX ORDER — FUROSEMIDE 20 MG/1
TABLET ORAL
Qty: 180 TABLET | Refills: 0 | OUTPATIENT
Start: 2023-07-03

## 2023-07-03 RX ORDER — APIXABAN 5 MG/1
TABLET, FILM COATED ORAL
Qty: 180 TABLET | Refills: 2 | OUTPATIENT
Start: 2023-07-03

## 2023-07-03 RX ORDER — METOPROLOL SUCCINATE 50 MG/1
TABLET, EXTENDED RELEASE ORAL
Qty: 180 TABLET | Refills: 2 | OUTPATIENT
Start: 2023-07-03

## 2023-07-24 DIAGNOSIS — E11.9 TYPE 2 DIABETES MELLITUS WITHOUT COMPLICATION, WITHOUT LONG-TERM CURRENT USE OF INSULIN (HCC): Primary | ICD-10-CM

## 2023-07-24 RX ORDER — GLUCOSAMINE HCL/CHONDROITIN SU 500-400 MG
CAPSULE ORAL
Qty: 100 STRIP | Refills: 5 | Status: SHIPPED | OUTPATIENT
Start: 2023-07-24

## 2023-07-24 NOTE — TELEPHONE ENCOUNTER
Patient calling in for new glucose monitor and strips. Current strips at home are  and she would like to see if the insurance will pay for a new machine since it has been a few years.

## 2023-08-19 DIAGNOSIS — K21.9 GASTROESOPHAGEAL REFLUX DISEASE WITHOUT ESOPHAGITIS: ICD-10-CM

## 2023-08-19 DIAGNOSIS — I48.91 ATRIAL FIBRILLATION, UNSPECIFIED TYPE (HCC): ICD-10-CM

## 2023-08-21 DIAGNOSIS — K21.9 GASTROESOPHAGEAL REFLUX DISEASE WITHOUT ESOPHAGITIS: ICD-10-CM

## 2023-08-21 RX ORDER — MECOBALAMIN 5000 MCG
30 TABLET,DISINTEGRATING ORAL DAILY
Qty: 180 CAPSULE | Refills: 1 | OUTPATIENT
Start: 2023-08-21

## 2023-08-21 RX ORDER — METOPROLOL SUCCINATE 50 MG/1
50 TABLET, EXTENDED RELEASE ORAL 2 TIMES DAILY
Qty: 180 TABLET | Refills: 2 | Status: SHIPPED | OUTPATIENT
Start: 2023-08-21

## 2023-08-21 RX ORDER — APIXABAN 5 MG/1
TABLET, FILM COATED ORAL
Qty: 180 TABLET | Refills: 2 | OUTPATIENT
Start: 2023-08-21

## 2023-08-21 RX ORDER — MECOBALAMIN 5000 MCG
30 TABLET,DISINTEGRATING ORAL DAILY
Qty: 90 CAPSULE | Refills: 2 | OUTPATIENT
Start: 2023-08-21

## 2023-08-21 RX ORDER — METOPROLOL SUCCINATE 50 MG/1
TABLET, EXTENDED RELEASE ORAL
Qty: 180 TABLET | Refills: 2 | OUTPATIENT
Start: 2023-08-21

## 2023-08-21 NOTE — TELEPHONE ENCOUNTER
Refill needed on:    Lansoprzole 15 mg     At [facility] in [place]     Next visit 9/25/23

## 2023-08-22 DIAGNOSIS — K21.9 GASTROESOPHAGEAL REFLUX DISEASE WITHOUT ESOPHAGITIS: ICD-10-CM

## 2023-08-22 DIAGNOSIS — R60.1 GENERALIZED EDEMA: ICD-10-CM

## 2023-08-22 RX ORDER — MECOBALAMIN 5000 MCG
30 TABLET,DISINTEGRATING ORAL DAILY
Qty: 180 CAPSULE | Refills: 2 | Status: SHIPPED | OUTPATIENT
Start: 2023-08-22

## 2023-08-23 RX ORDER — FUROSEMIDE 20 MG/1
TABLET ORAL
Qty: 180 TABLET | Refills: 0 | Status: SHIPPED | OUTPATIENT
Start: 2023-08-23

## 2023-08-23 RX ORDER — MECOBALAMIN 5000 MCG
30 TABLET,DISINTEGRATING ORAL DAILY
Qty: 180 CAPSULE | Refills: 1 | OUTPATIENT
Start: 2023-08-23

## 2023-09-25 ENCOUNTER — OFFICE VISIT (OUTPATIENT)
Dept: FAMILY MEDICINE CLINIC | Age: 83
End: 2023-09-25
Payer: MEDICARE

## 2023-09-25 VITALS
BODY MASS INDEX: 34.02 KG/M2 | HEIGHT: 63 IN | RESPIRATION RATE: 18 BRPM | SYSTOLIC BLOOD PRESSURE: 122 MMHG | DIASTOLIC BLOOD PRESSURE: 81 MMHG | OXYGEN SATURATION: 96 % | WEIGHT: 192 LBS | HEART RATE: 96 BPM

## 2023-09-25 DIAGNOSIS — L98.9 SKIN LESION: ICD-10-CM

## 2023-09-25 DIAGNOSIS — B36.9 FUNGAL SKIN INFECTION: ICD-10-CM

## 2023-09-25 DIAGNOSIS — Z23 NEED FOR INFLUENZA VACCINATION: ICD-10-CM

## 2023-09-25 DIAGNOSIS — E11.9 TYPE 2 DIABETES MELLITUS WITHOUT COMPLICATION, WITHOUT LONG-TERM CURRENT USE OF INSULIN (HCC): Primary | ICD-10-CM

## 2023-09-25 LAB — HBA1C MFR BLD: 6.7 %

## 2023-09-25 PROCEDURE — 1123F ACP DISCUSS/DSCN MKR DOCD: CPT | Performed by: FAMILY MEDICINE

## 2023-09-25 PROCEDURE — G0008 ADMIN INFLUENZA VIRUS VAC: HCPCS | Performed by: FAMILY MEDICINE

## 2023-09-25 PROCEDURE — 3074F SYST BP LT 130 MM HG: CPT | Performed by: FAMILY MEDICINE

## 2023-09-25 PROCEDURE — 3044F HG A1C LEVEL LT 7.0%: CPT | Performed by: FAMILY MEDICINE

## 2023-09-25 PROCEDURE — 90694 VACC AIIV4 NO PRSRV 0.5ML IM: CPT | Performed by: FAMILY MEDICINE

## 2023-09-25 PROCEDURE — 3078F DIAST BP <80 MM HG: CPT | Performed by: FAMILY MEDICINE

## 2023-09-25 PROCEDURE — 83036 HEMOGLOBIN GLYCOSYLATED A1C: CPT | Performed by: FAMILY MEDICINE

## 2023-09-25 PROCEDURE — 99214 OFFICE O/P EST MOD 30 MIN: CPT | Performed by: FAMILY MEDICINE

## 2023-09-25 RX ORDER — CLOTRIMAZOLE 1 %
CREAM (GRAM) TOPICAL
Qty: 60 G | Refills: 1 | Status: SHIPPED | OUTPATIENT
Start: 2023-09-25 | End: 2023-10-02

## 2023-09-25 NOTE — PROGRESS NOTES
[facility]  Department of Family Medicine  Family Medicine Residency Program      Patient:  Libby Ga 81 y.o. female  Date of Service: 9/25/23      Chief complaint:   Chief Complaint   Patient presents with    Hypertension    Diabetes       Assessment and Plan   1. Type 2 diabetes mellitus without complication, without long-term current use of insulin ([facility])  Glucose controlled A1c is 6.7 no changes at this time  - POCT glycosylated hemoglobin (Hb A1C)  -  DIABETES FOOT EXAM    2. Skin lesion  Refer to dermatology for evaluation of what is most likely a seborrheic keratosis  - Rosaria Curling, D.O, Dermatology, [place] ([facility])    3. Need for influenza vaccination  - Influenza, FLUAD, (age 72 y+), IM, Preservative Free, 0.5 mL    4. Fungal skin infection  We will recommend clotrimazole for the rash to see if this is able to have it resolved otherwise she can request dermatology to evaluate. - clotrimazole (LOTRIMIN AF) 1 % cream; Apply topically 2 times daily. Dispense: 60 g; Refill: 1      Return to Office: Return in about 6 months (around 3/25/2024) for A-fib, High Blood Pressure, Diabetes. History ofPresent Illness   The patient is a 81 y.o. female  presented to the clinic with complaints as above. Skin lesion - requesting derm referral.  Stable appearance located on the back around the area of the bra line. Groin rash - itching and decreased itching with steroid cream.  Not itching today. Patient declines physical evaluation and wishes to continue topical treatment. Urinary incontinence - describes some overflow or stress incotinence     Review of Systems:   Review of Systems   Respiratory:  Negative for chest tightness and shortness of breath. Cardiovascular:  Negative for chest pain and palpitations. Gastrointestinal:  Negative for abdominal pain. Skin:  Positive for rash.        Physical Exam   Vitals: /81   Pulse 96   Resp 18   Ht 5' 3\" (1.6

## 2023-09-25 NOTE — PROGRESS NOTES
Complaining of periodic itching onset 4 months ago, wants to know if it is caused by medications. Itching is episodic and concentrated in the groin and sometimes on the posterior heel. Endorses urinary incontinence related to increased urgency, denies leakage with straining or coughing. Says that she wears pads and has accidents once a day. Denies chest pain, SOB, fevers, chills, night sweats, hot flashes. Wants to see a dermatologist for a previously evaluated lesion because it has been bothering her when she wears her bra. Wishes to wait until she gets new insurance for that referral. Denies pain or significant growth.

## 2023-10-10 ASSESSMENT — ENCOUNTER SYMPTOMS
CHEST TIGHTNESS: 0
SHORTNESS OF BREATH: 0
ABDOMINAL PAIN: 0

## 2023-11-14 ENCOUNTER — TELEPHONE (OUTPATIENT)
Dept: FAMILY MEDICINE CLINIC | Age: 83
End: 2023-11-14

## 2023-11-14 ENCOUNTER — OFFICE VISIT (OUTPATIENT)
Dept: FAMILY MEDICINE CLINIC | Age: 83
End: 2023-11-14
Payer: MEDICARE

## 2023-11-14 VITALS
RESPIRATION RATE: 17 BRPM | TEMPERATURE: 97.2 F | OXYGEN SATURATION: 97 % | BODY MASS INDEX: 35.07 KG/M2 | SYSTOLIC BLOOD PRESSURE: 138 MMHG | WEIGHT: 198 LBS | DIASTOLIC BLOOD PRESSURE: 82 MMHG | HEART RATE: 98 BPM

## 2023-11-14 DIAGNOSIS — R29.898 LEG WEAKNESS, BILATERAL: ICD-10-CM

## 2023-11-14 DIAGNOSIS — Z12.31 BREAST CANCER SCREENING BY MAMMOGRAM: Primary | ICD-10-CM

## 2023-11-14 DIAGNOSIS — M62.838 TRAPEZIUS MUSCLE SPASM: Primary | ICD-10-CM

## 2023-11-14 DIAGNOSIS — Z91.81 AT RISK FOR FALLS: ICD-10-CM

## 2023-11-14 DIAGNOSIS — R26.89 IMBALANCE: ICD-10-CM

## 2023-11-14 PROCEDURE — 99213 OFFICE O/P EST LOW 20 MIN: CPT

## 2023-11-14 PROCEDURE — 3075F SYST BP GE 130 - 139MM HG: CPT

## 2023-11-14 PROCEDURE — 3079F DIAST BP 80-89 MM HG: CPT

## 2023-11-14 PROCEDURE — 1123F ACP DISCUSS/DSCN MKR DOCD: CPT

## 2023-11-14 ASSESSMENT — ENCOUNTER SYMPTOMS
ABDOMINAL PAIN: 0
SHORTNESS OF BREATH: 0
NAUSEA: 0
VOMITING: 0
CONSTIPATION: 0
DIARRHEA: 0

## 2023-11-14 NOTE — TELEPHONE ENCOUNTER
Labs/message reviewed for physician that I am covering. This can wait for ordering physician to review.

## 2023-12-14 DIAGNOSIS — R60.1 GENERALIZED EDEMA: ICD-10-CM

## 2023-12-14 RX ORDER — FUROSEMIDE 20 MG/1
TABLET ORAL
Qty: 180 TABLET | Refills: 0 | Status: SHIPPED | OUTPATIENT
Start: 2023-12-14

## 2024-01-22 ENCOUNTER — OFFICE VISIT (OUTPATIENT)
Dept: FAMILY MEDICINE CLINIC | Age: 84
End: 2024-01-22
Payer: MEDICARE

## 2024-01-22 VITALS
SYSTOLIC BLOOD PRESSURE: 138 MMHG | HEIGHT: 63 IN | DIASTOLIC BLOOD PRESSURE: 78 MMHG | BODY MASS INDEX: 35.26 KG/M2 | TEMPERATURE: 97.5 F | OXYGEN SATURATION: 96 % | RESPIRATION RATE: 16 BRPM | WEIGHT: 199 LBS | HEART RATE: 92 BPM

## 2024-01-22 DIAGNOSIS — R06.2 WHEEZING: ICD-10-CM

## 2024-01-22 DIAGNOSIS — I48.91 ATRIAL FIBRILLATION, UNSPECIFIED TYPE (HCC): ICD-10-CM

## 2024-01-22 DIAGNOSIS — E11.9 TYPE 2 DIABETES MELLITUS WITHOUT COMPLICATION, WITHOUT LONG-TERM CURRENT USE OF INSULIN (HCC): ICD-10-CM

## 2024-01-22 DIAGNOSIS — E66.01 SEVERE OBESITY (BMI 35.0-39.9) WITH COMORBIDITY (HCC): ICD-10-CM

## 2024-01-22 DIAGNOSIS — D68.69 SECONDARY HYPERCOAGULABLE STATE (HCC): ICD-10-CM

## 2024-01-22 DIAGNOSIS — I10 ESSENTIAL HYPERTENSION: Primary | ICD-10-CM

## 2024-01-22 LAB — HBA1C MFR BLD: 6.7 %

## 2024-01-22 PROCEDURE — 99214 OFFICE O/P EST MOD 30 MIN: CPT | Performed by: FAMILY MEDICINE

## 2024-01-22 PROCEDURE — 83036 HEMOGLOBIN GLYCOSYLATED A1C: CPT | Performed by: FAMILY MEDICINE

## 2024-01-22 PROCEDURE — 3075F SYST BP GE 130 - 139MM HG: CPT | Performed by: FAMILY MEDICINE

## 2024-01-22 PROCEDURE — 3044F HG A1C LEVEL LT 7.0%: CPT | Performed by: FAMILY MEDICINE

## 2024-01-22 PROCEDURE — 1123F ACP DISCUSS/DSCN MKR DOCD: CPT | Performed by: FAMILY MEDICINE

## 2024-01-22 PROCEDURE — 3078F DIAST BP <80 MM HG: CPT | Performed by: FAMILY MEDICINE

## 2024-01-22 ASSESSMENT — ENCOUNTER SYMPTOMS
WHEEZING: 1
SHORTNESS OF BREATH: 0
CHEST TIGHTNESS: 0
ABDOMINAL PAIN: 0

## 2024-01-22 ASSESSMENT — PATIENT HEALTH QUESTIONNAIRE - PHQ9
SUM OF ALL RESPONSES TO PHQ QUESTIONS 1-9: 0
SUM OF ALL RESPONSES TO PHQ QUESTIONS 1-9: 0
2. FEELING DOWN, DEPRESSED OR HOPELESS: 0
SUM OF ALL RESPONSES TO PHQ9 QUESTIONS 1 & 2: 0
1. LITTLE INTEREST OR PLEASURE IN DOING THINGS: 0
SUM OF ALL RESPONSES TO PHQ QUESTIONS 1-9: 0
SUM OF ALL RESPONSES TO PHQ QUESTIONS 1-9: 0

## 2024-01-22 NOTE — PROGRESS NOTES
[facility]  Department of Family Medicine  Family Medicine Residency Program      Patient:  Janny Harkins 83 y.o. female  Date of Service: 1/22/24      Chief complaint:   Chief Complaint   Patient presents with    Diabetes    Hypertension    Extremity Weakness     Leg weakness/imbalance        Assessment and Plan   1. Essential hypertension  Blood pressure controlled no changes recommended continue current medications including irbesartan, metoprolol    2. Type 2 diabetes mellitus without complication, without long-term current use of insulin ([facility])  A1c controlled no changes recommended  - POCT glycosylated hemoglobin (Hb A1C)    3. Atrial fibrillation, unspecified type ([facility])  Patient tolerating anticoagulation with apixaban no bleeding reported.    4. Severe obesity (BMI 35.0-39.9) with comorbidity ([facility])  Lifestyle modifications recommended    5. Secondary hypercoagulable state ([facility])  Anticoagulated with apixaban    6. Wheezing  Going to get a PFT in order to evaluate the patient's breathing status as I have not appreciated wheezing at this visit nor at prior visits.  If PFT is abnormal we will reassess for pulmonary condition.  In the meantime I did suggest allergy pillow covers to reduce exposures as well as keeping bedroom and laundry to weekly.  Fluid balance appears appropriate today however I have asked her to pay attention to her leg swelling and determine if that is worse if the wheezing symptom that she reports is worse.  I have asked her that if she does have an episode of wheezing if she could come to the office for an evaluation since it helps  - Full PFT Study With Bronchodilator; Future      Return to Office: Return in about 3 months (around 4/22/2024) for Diabetes, High Blood Pressure, wheezing.    History ofPresent Illness   The patient is a 83 y.o. female  presented to the clinic with complaints as above.    DM2 - does not check.  No sx of hi/lo glc.     HTN - no CV

## 2024-01-29 ENCOUNTER — HOSPITAL ENCOUNTER (OUTPATIENT)
Dept: MAMMOGRAPHY | Age: 84
Discharge: HOME | End: 2024-01-31
Payer: MEDICARE

## 2024-01-29 VITALS — WEIGHT: 190 LBS | HEIGHT: 63 IN | BODY MASS INDEX: 33.66 KG/M2

## 2024-01-29 DIAGNOSIS — Z12.31 BREAST CANCER SCREENING BY MAMMOGRAM: ICD-10-CM

## 2024-01-29 PROCEDURE — 77063 BREAST TOMOSYNTHESIS BI: CPT

## 2024-02-06 ENCOUNTER — HOSPITAL ENCOUNTER (OUTPATIENT)
Dept: PULMONOLOGY | Age: 84
Discharge: HOME | End: 2024-02-06
Attending: FAMILY MEDICINE
Payer: MEDICARE

## 2024-02-06 DIAGNOSIS — R06.2 WHEEZING: ICD-10-CM

## 2024-02-06 PROCEDURE — 94729 DIFFUSING CAPACITY: CPT

## 2024-02-06 PROCEDURE — 94726 PLETHYSMOGRAPHY LUNG VOLUMES: CPT

## 2024-02-06 PROCEDURE — 94060 EVALUATION OF WHEEZING: CPT

## 2024-02-14 ENCOUNTER — TELEPHONE (OUTPATIENT)
Dept: FAMILY MEDICINE CLINIC | Age: 84
End: 2024-02-14

## 2024-02-14 NOTE — TELEPHONE ENCOUNTER
Patient called in wondering about results of PFT done 2-6-24    I do not see an interpretation done yet, just the test.     Please advise

## 2024-02-14 NOTE — TELEPHONE ENCOUNTER
Pulmonology classically runs behind with interpretation.  Please inform the patient we are still waiting for the interpretation and please reach out to pulm and request the interpretation.

## 2024-02-14 NOTE — TELEPHONE ENCOUNTER
----- Message from Sayra Munguia sent at 2/13/2024  3:57 PM EST -----  Subject: Results Request    QUESTIONS  Results: Pulmonary function test; Ordered by: Danial Tovar   Date Performed: 2024-02-06  ---------------------------------------------------------------------------  --------------  CALL BACK INFO    [phone]; OK to leave message on voicemail  ---------------------------------------------------------------------------  --------------

## 2024-03-08 DIAGNOSIS — M62.838 TRAPEZIUS MUSCLE SPASM: ICD-10-CM

## 2024-03-08 RX ORDER — ALBUTEROL SULFATE 90 UG/1
2 AEROSOL, METERED RESPIRATORY (INHALATION) EVERY 6 HOURS PRN
Qty: 18 G | Refills: 0 | Status: SHIPPED | OUTPATIENT
Start: 2024-03-08

## 2024-03-08 NOTE — PROGRESS NOTES
Please offer instructions on how to use inhaler only IF she wheezes and no more than four times per day.    PFT shows some impairment and we should review at her appt. Notify with any issues.

## 2024-03-23 DIAGNOSIS — R60.1 GENERALIZED EDEMA: ICD-10-CM

## 2024-03-25 DIAGNOSIS — I48.91 ATRIAL FIBRILLATION, UNSPECIFIED TYPE (HCC): ICD-10-CM

## 2024-03-25 DIAGNOSIS — K21.9 GASTROESOPHAGEAL REFLUX DISEASE WITHOUT ESOPHAGITIS: ICD-10-CM

## 2024-03-25 RX ORDER — APIXABAN 5 MG/1
5 TABLET, FILM COATED ORAL 2 TIMES DAILY
Qty: 180 TABLET | Refills: 0 | Status: SHIPPED | OUTPATIENT
Start: 2024-03-25

## 2024-03-25 RX ORDER — FUROSEMIDE 20 MG/1
TABLET ORAL
Qty: 180 TABLET | Refills: 0 | Status: SHIPPED | OUTPATIENT
Start: 2024-03-25

## 2024-03-25 RX ORDER — MECOBALAMIN 5000 MCG
30 TABLET,DISINTEGRATING ORAL DAILY
Qty: 180 CAPSULE | Refills: 0 | Status: SHIPPED | OUTPATIENT
Start: 2024-03-25

## 2024-03-25 RX ORDER — ALBUTEROL SULFATE 90 UG/1
AEROSOL, METERED RESPIRATORY (INHALATION)
Qty: 18 EACH | Refills: 0 | Status: SHIPPED | OUTPATIENT
Start: 2024-03-25

## 2024-04-12 DIAGNOSIS — R60.1 GENERALIZED EDEMA: ICD-10-CM

## 2024-04-12 DIAGNOSIS — I10 ESSENTIAL HYPERTENSION: ICD-10-CM

## 2024-04-12 DIAGNOSIS — I48.91 ATRIAL FIBRILLATION, UNSPECIFIED TYPE (HCC): ICD-10-CM

## 2024-04-12 RX ORDER — FUROSEMIDE 20 MG/1
20 TABLET ORAL 2 TIMES DAILY
Qty: 180 TABLET | Refills: 1 | Status: SHIPPED | OUTPATIENT
Start: 2024-04-12

## 2024-04-12 RX ORDER — FUROSEMIDE 20 MG/1
TABLET ORAL
Qty: 180 TABLET | Refills: 0 | OUTPATIENT
Start: 2024-04-12

## 2024-04-12 RX ORDER — IRBESARTAN 150 MG/1
150 TABLET ORAL DAILY
Qty: 90 TABLET | Refills: 1 | Status: SHIPPED | OUTPATIENT
Start: 2024-04-12

## 2024-04-12 RX ORDER — METOPROLOL SUCCINATE 50 MG/1
TABLET, EXTENDED RELEASE ORAL
Qty: 180 TABLET | Refills: 2 | Status: SHIPPED | OUTPATIENT
Start: 2024-04-12

## 2024-04-29 ENCOUNTER — OFFICE VISIT (OUTPATIENT)
Dept: FAMILY MEDICINE CLINIC | Age: 84
End: 2024-04-29
Payer: MEDICARE

## 2024-04-29 VITALS
OXYGEN SATURATION: 95 % | HEART RATE: 76 BPM | DIASTOLIC BLOOD PRESSURE: 79 MMHG | RESPIRATION RATE: 16 BRPM | TEMPERATURE: 98.7 F | HEIGHT: 63 IN | SYSTOLIC BLOOD PRESSURE: 128 MMHG | WEIGHT: 196 LBS | BODY MASS INDEX: 34.73 KG/M2

## 2024-04-29 DIAGNOSIS — M62.838 TRAPEZIUS MUSCLE SPASM: ICD-10-CM

## 2024-04-29 DIAGNOSIS — I48.91 ATRIAL FIBRILLATION, UNSPECIFIED TYPE (HCC): ICD-10-CM

## 2024-04-29 DIAGNOSIS — I10 ESSENTIAL HYPERTENSION: ICD-10-CM

## 2024-04-29 DIAGNOSIS — K21.9 GASTROESOPHAGEAL REFLUX DISEASE WITHOUT ESOPHAGITIS: ICD-10-CM

## 2024-04-29 DIAGNOSIS — R60.1 GENERALIZED EDEMA: ICD-10-CM

## 2024-04-29 PROCEDURE — 3078F DIAST BP <80 MM HG: CPT | Performed by: FAMILY MEDICINE

## 2024-04-29 PROCEDURE — 3074F SYST BP LT 130 MM HG: CPT | Performed by: FAMILY MEDICINE

## 2024-04-29 PROCEDURE — 1123F ACP DISCUSS/DSCN MKR DOCD: CPT | Performed by: FAMILY MEDICINE

## 2024-04-29 PROCEDURE — 99214 OFFICE O/P EST MOD 30 MIN: CPT | Performed by: FAMILY MEDICINE

## 2024-04-29 RX ORDER — ECHINACEA PURPUREA EXTRACT 125 MG
1 TABLET ORAL PRN
Qty: 1 EACH | Refills: 3 | Status: SHIPPED | OUTPATIENT
Start: 2024-04-29

## 2024-04-29 RX ORDER — MECOBALAMIN 5000 MCG
30 TABLET,DISINTEGRATING ORAL DAILY
Qty: 180 CAPSULE | Refills: 0 | Status: SHIPPED | OUTPATIENT
Start: 2024-04-29

## 2024-04-29 RX ORDER — IRBESARTAN 150 MG/1
150 TABLET ORAL DAILY
Qty: 90 TABLET | Refills: 1 | Status: SHIPPED | OUTPATIENT
Start: 2024-04-29

## 2024-04-29 RX ORDER — METOPROLOL SUCCINATE 50 MG/1
TABLET, EXTENDED RELEASE ORAL
Qty: 180 TABLET | Refills: 2 | Status: SHIPPED | OUTPATIENT
Start: 2024-04-29

## 2024-04-29 RX ORDER — KETOCONAZOLE 20 MG/G
60 CREAM TOPICAL 2 TIMES DAILY PRN
COMMUNITY
Start: 2024-04-12

## 2024-04-29 RX ORDER — FEXOFENADINE HCL 180 MG/1
180 TABLET ORAL DAILY PRN
Qty: 30 TABLET | Refills: 5 | Status: SHIPPED | OUTPATIENT
Start: 2024-04-29 | End: 2024-10-26

## 2024-04-29 RX ORDER — FUROSEMIDE 20 MG/1
20 TABLET ORAL 2 TIMES DAILY
Qty: 180 TABLET | Refills: 1 | Status: SHIPPED | OUTPATIENT
Start: 2024-04-29

## 2024-04-29 SDOH — ECONOMIC STABILITY: FOOD INSECURITY: WITHIN THE PAST 12 MONTHS, YOU WORRIED THAT YOUR FOOD WOULD RUN OUT BEFORE YOU GOT MONEY TO BUY MORE.: NEVER TRUE

## 2024-04-29 SDOH — ECONOMIC STABILITY: FOOD INSECURITY: WITHIN THE PAST 12 MONTHS, THE FOOD YOU BOUGHT JUST DIDN'T LAST AND YOU DIDN'T HAVE MONEY TO GET MORE.: NEVER TRUE

## 2024-04-29 SDOH — ECONOMIC STABILITY: INCOME INSECURITY: HOW HARD IS IT FOR YOU TO PAY FOR THE VERY BASICS LIKE FOOD, HOUSING, MEDICAL CARE, AND HEATING?: NOT HARD AT ALL

## 2024-04-29 NOTE — PROGRESS NOTES
[facility]  Department of Family Medicine  Family Medicine Residency Program      Patient:  Janny Harkins 84 y.o. female  Date of Service: 4/29/24      Chief complaint:   Chief Complaint   Patient presents with    Atrial Fibrillation    Diabetes    Hypertension       Assessment and Plan   1. Essential hypertension  Controlled continue irbesartan  - irbesartan (AVAPRO) 150 MG tablet; Take 1 tablet by mouth daily  Dispense: 90 tablet; Refill: 1    2. Gastroesophageal reflux disease without esophagitis  Continue present  - lansoprazole (PREVACID) 15 MG delayed release capsule; Take 2 capsules by mouth daily 30-60min prior to your first meal  Dispense: 180 capsule; Refill: 0    3. Atrial fibrillation, unspecified type (HCC)  Continue metoprolol  - metoprolol succinate (TOPROL XL) 50 MG extended release tablet; TAKE 1 TABLET BY MOUTH IN THE MORNING AND IN THE EVENING  Dispense: 180 tablet; Refill: 2    4. Trapezius muscle spasm  Diclofenac as needed  - diclofenac sodium (VOLTAREN) 1 % GEL; Apply 2 g topically 4 times daily  Dispense: 350 g; Refill: 2    5. Generalized edema  Continue Lasix  - furosemide (LASIX) 20 MG tablet; Take 1 tablet by mouth 2 times daily  Dispense: 180 tablet; Refill: 1      Return to Office: Return in about 6 months (around 10/29/2024) for Gerd, A-fib, High Blood Pressure, Diabetes.    History ofPresent Illness   The patient is a 84 y.o. female  presented to the clinic with complaints as above.    DM - controlled A1C numbers with diet control.  Overall no sx reported.     HTN - BP controlled.  No CV sx reported.      Afib - Tolerating eliquis without sx of bleeding.      GERD - Sx stable/controlled with prevacid.      +nosebleeds last week.  Was picking nose when occurred.  Does have some spring allergy sx.      Sharp/Stabbing L sided pain occurs randomly and is gone in seconds.  Feels like a needle in the L side.  She is requesting refill of voltaren for aches

## 2024-05-07 ENCOUNTER — TELEPHONE (OUTPATIENT)
Dept: FAMILY MEDICINE CLINIC | Age: 84
End: 2024-05-07

## 2024-05-07 DIAGNOSIS — R53.81 PHYSICAL DECONDITIONING: Primary | ICD-10-CM

## 2024-05-07 NOTE — TELEPHONE ENCOUNTER
Last Appointment   4/29/2024  Next Appointment  7/1/2024  Patient called in to ask if we would do a letter for her to [facility] so they will cut her grass or pay to have it cut, will need the reasoning in the letter as to why she cannot. Please fax to Amy at [phone]-she thinks this is the correct fax.

## 2024-05-07 NOTE — TELEPHONE ENCOUNTER
I have never written such a letter.  Can we pull social work into this conversation.  I do agree that her mowing lawn is likely too much for her to handle physically.

## 2024-05-09 ENCOUNTER — CARE COORDINATION (OUTPATIENT)
Dept: CARE COORDINATION | Age: 84
End: 2024-05-09

## 2024-05-09 NOTE — CARE COORDINATION
Cassy with [facility] did reach back out to [facility] and updated that the referral is no longer needed.  All matters were resolved with the faxing of that letter.  [facility] will sign off and close the referral today.

## 2024-05-09 NOTE — CARE COORDINATION
[facility] received a referral from the PCP regarding physical deconditioning for Dilcia.   [facility] reviewed chart and notes that Dilcia had called office stating that [facility] was in need of a letter from BPC/PCP stating she was not able to manage her lawn care before those services would be provided to her for no cost.  Office was able to prepare and fax the requested letter to  Amy at [facility] and verification is in the media file.   [facility] calling to clarify if PCP has any other SW concerns for Dilcia that he was wanting assessed of if this referral has been completed and can be closed.  Spoke with Dottie in the office who was a great help and understood reason for [facility] call.  Dottie states she does not think there was more needs for Dilcia and that the referral can be closed but will pass the message onto Uzair to call [facility] back.   [facility] apprenticed Dottie's help, as always.       [facility] will follow if needed or close referral if completed

## 2024-07-15 ENCOUNTER — OFFICE VISIT (OUTPATIENT)
Dept: FAMILY MEDICINE CLINIC | Age: 84
End: 2024-07-15
Payer: MEDICARE

## 2024-07-15 VITALS
RESPIRATION RATE: 18 BRPM | BODY MASS INDEX: 34.38 KG/M2 | OXYGEN SATURATION: 95 % | DIASTOLIC BLOOD PRESSURE: 77 MMHG | WEIGHT: 194 LBS | HEIGHT: 63 IN | SYSTOLIC BLOOD PRESSURE: 134 MMHG | TEMPERATURE: 98.8 F | HEART RATE: 79 BPM

## 2024-07-15 DIAGNOSIS — J45.901 MODERATE ASTHMA WITH EXACERBATION, UNSPECIFIED WHETHER PERSISTENT: Primary | ICD-10-CM

## 2024-07-15 DIAGNOSIS — J06.9 VIRAL URI: ICD-10-CM

## 2024-07-15 PROCEDURE — 3075F SYST BP GE 130 - 139MM HG: CPT | Performed by: FAMILY MEDICINE

## 2024-07-15 PROCEDURE — 1123F ACP DISCUSS/DSCN MKR DOCD: CPT | Performed by: FAMILY MEDICINE

## 2024-07-15 PROCEDURE — 3078F DIAST BP <80 MM HG: CPT | Performed by: FAMILY MEDICINE

## 2024-07-15 PROCEDURE — 99213 OFFICE O/P EST LOW 20 MIN: CPT | Performed by: FAMILY MEDICINE

## 2024-07-15 RX ORDER — PREDNISONE 20 MG/1
20 TABLET ORAL DAILY
Qty: 7 TABLET | Refills: 0 | Status: SHIPPED | OUTPATIENT
Start: 2024-07-15 | End: 2024-07-22

## 2024-07-15 RX ORDER — ALBUTEROL SULFATE 90 UG/1
AEROSOL, METERED RESPIRATORY (INHALATION)
Qty: 18 EACH | Refills: 0 | Status: SHIPPED | OUTPATIENT
Start: 2024-07-15

## 2024-07-15 NOTE — PROGRESS NOTES
[facility]  Department of Family Medicine  Family Medicine Residency Program      Patient:  Janny Harkins 84 y.o. female  Date of Service: 7/15/24      Chief complaint:   Chief Complaint   Patient presents with    Congestion    Cough     X 1 week - Friend visited her last week and had a cold   Has taken OTC Dayquil/Nyquil; Tylenol   Only taking the allergy pills PRN (allegra)        Assessment and Plan   1. Moderate asthma with exacerbation, unspecified whether persistent  Initiate prednisone if not improving chest x-ray and follow-up with  - predniSONE (DELTASONE) 20 MG tablet; Take 1 tablet by mouth daily for 7 days  Dispense: 7 tablet; Refill: 0  - XR CHEST STANDARD (2 VW); Future    2. Viral URI  - XR CHEST STANDARD (2 VW); Future      Return to Office: No follow-ups on file.    History ofPresent Illness   The patient is a 84 y.o. female  presented to the clinic with complaints as above.    URI sx - started about a week ago and is dropping into the chest.  Cough is significant.  Did not have sore throat.  Wheezing present. Has not had great relief with OTC meds.  Worsening over the past 2 days.       Review of Systems:   Review of Systems   HENT:  Positive for congestion and rhinorrhea.    Respiratory:  Positive for cough and wheezing. Negative for shortness of breath.    Cardiovascular:  Negative for chest pain.   Gastrointestinal:  Negative for abdominal pain.       Physical Exam   Vitals: /77 Comment: man  Pulse 79   Temp 98.8 °F (37.1 °C)   Resp 18   Ht 1.6 m (5' 3\")   Wt 88 kg (194 lb)   SpO2 95%   BMI 34.37 kg/m²   BP Readings from Last 3 Encounters:   07/17/24 (!) 141/98   07/15/24 134/77   04/29/24 128/79     Physical Exam  Vitals reviewed.   Constitutional:       General: She is not in acute distress.  HENT:      Right Ear: Tympanic membrane normal.      Left Ear: Tympanic membrane normal.      Nose: Congestion and rhinorrhea present.      Mouth/Throat:

## 2024-07-17 ENCOUNTER — HOSPITAL ENCOUNTER (OUTPATIENT)
Age: 84
Discharge: HOME | End: 2024-07-19
Payer: MEDICARE

## 2024-07-17 ENCOUNTER — HOSPITAL ENCOUNTER (OUTPATIENT)
Dept: GENERAL RADIOLOGY | Age: 84
Discharge: HOME | End: 2024-07-19
Payer: MEDICARE

## 2024-07-17 ENCOUNTER — OFFICE VISIT (OUTPATIENT)
Dept: FAMILY MEDICINE CLINIC | Age: 84
End: 2024-07-17
Payer: MEDICARE

## 2024-07-17 VITALS
HEIGHT: 62 IN | SYSTOLIC BLOOD PRESSURE: 141 MMHG | BODY MASS INDEX: 35.7 KG/M2 | RESPIRATION RATE: 18 BRPM | TEMPERATURE: 98.2 F | DIASTOLIC BLOOD PRESSURE: 98 MMHG | HEART RATE: 81 BPM | WEIGHT: 194 LBS | OXYGEN SATURATION: 98 %

## 2024-07-17 DIAGNOSIS — B34.9 VIRAL SYNDROME: ICD-10-CM

## 2024-07-17 DIAGNOSIS — J45.901 MODERATE ASTHMA WITH EXACERBATION, UNSPECIFIED WHETHER PERSISTENT: ICD-10-CM

## 2024-07-17 DIAGNOSIS — J45.901 EXACERBATION OF ASTHMA, UNSPECIFIED ASTHMA SEVERITY, UNSPECIFIED WHETHER PERSISTENT: Primary | ICD-10-CM

## 2024-07-17 DIAGNOSIS — J06.9 VIRAL URI: ICD-10-CM

## 2024-07-17 PROCEDURE — 71046 X-RAY EXAM CHEST 2 VIEWS: CPT

## 2024-07-17 PROCEDURE — 3080F DIAST BP >= 90 MM HG: CPT | Performed by: FAMILY MEDICINE

## 2024-07-17 PROCEDURE — 3077F SYST BP >= 140 MM HG: CPT | Performed by: FAMILY MEDICINE

## 2024-07-17 PROCEDURE — 99213 OFFICE O/P EST LOW 20 MIN: CPT | Performed by: FAMILY MEDICINE

## 2024-07-17 PROCEDURE — 1123F ACP DISCUSS/DSCN MKR DOCD: CPT | Performed by: FAMILY MEDICINE

## 2024-07-17 PROCEDURE — 94640 AIRWAY INHALATION TREATMENT: CPT | Performed by: FAMILY MEDICINE

## 2024-07-17 RX ORDER — ALBUTEROL SULFATE 2.5 MG/3ML
2.5 SOLUTION RESPIRATORY (INHALATION) ONCE
Status: COMPLETED | OUTPATIENT
Start: 2024-07-17 | End: 2024-07-17

## 2024-07-17 RX ADMIN — ALBUTEROL SULFATE 2.5 MG: 2.5 SOLUTION RESPIRATORY (INHALATION) at 10:30

## 2024-07-17 NOTE — PROGRESS NOTES
on file   Housing Stability: Unknown (4/29/2024)    Housing Stability Vital Sign     Unable to Pay for Housing in the Last Year: Not on file     Number of Places Lived in the Last Year: Not on file     Unstable Housing in the Last Year: No        Family History:       Problem Relation Age of Onset    Diabetes Mother     Heart Disease Father     Prostate Cancer Father 65    Prostate Cancer Brother 65    Heart Disease Brother        Counseled regarding above diagnosis, including possible risks and complications,  especially if left uncontrolled. Counseled regarding the possible side effects, risks, benefits and alternatives to treatment;patient and/or guardian verbalizes understanding, agrees, feels comfortable with and wishes to proceed with above treatment plan.    Call or go to EDmediately if symptoms worsen or persist. Advised patient to call with any new medication issues, and, as applicable, read all Rx info from pharmacy to assure aware of all possible risks and side effects of medicationbefore taking.     Patient and/or guardian given opportunity to ask questions/raise concerns.  The patient verbalized comfort and understanding ofinstructions.    I encourage further reading and education about your health conditions.  Information on many health conditions is provided by the[facility] [employer] of [employer]: [email or web address]  Please bring any questions to me at your nextvisit.    Medication List:    Current Outpatient Medications   Medication Sig Dispense Refill    albuterol sulfate HFA (PROVENTIL;VENTOLIN;PROAIR) 108 (90 Base) MCG/ACT inhaler TAKE 2 PUFFS BY MOUTH EVERY 6 HOURS AS NEEDED FOR WHEEZE 18 each 0    ketoconazole (NIZORAL) 2 % cream Apply 60 g topically 2 times daily as needed (Feet as needed)      diclofenac sodium (VOLTAREN) 1 % GEL Apply 2 g topically 4 times daily 350 g 2    sodium chloride (ALTAMIST SPRAY) 0.65 % nasal spray 1 spray by Nasal route as needed for Congestion 1 each

## 2024-07-22 ENCOUNTER — TELEPHONE (OUTPATIENT)
Dept: FAMILY MEDICINE CLINIC | Age: 84
End: 2024-07-22

## 2024-07-22 NOTE — TELEPHONE ENCOUNTER
Happy to hear she is improving.     She can continue to use her OTC cough med as needed until cough resolves.

## 2024-07-22 NOTE — TELEPHONE ENCOUNTER
Last Appointment   7/17/2024  Next Appointment  11/1/2024  Patient called in to let us know she  is better but she has a little congestion and a small cough, anything she should be taking?

## 2024-07-29 ENCOUNTER — TELEPHONE (OUTPATIENT)
Dept: FAMILY MEDICINE CLINIC | Age: 84
End: 2024-07-29

## 2024-07-29 NOTE — TELEPHONE ENCOUNTER
Last Appointment   7/17/2024  Next Appointment  11/1/2024  Patient needs an updated letter sent to [employer] [facility] that she is not capable of cutting her own grass. Please fax to [phone]

## 2024-08-02 ENCOUNTER — TELEPHONE (OUTPATIENT)
Dept: FAMILY MEDICINE CLINIC | Age: 84
End: 2024-08-02

## 2024-08-02 RX ORDER — BUMETANIDE 0.5 MG/1
0.5 TABLET ORAL DAILY
Qty: 30 TABLET | Refills: 0 | Status: SHIPPED | OUTPATIENT
Start: 2024-08-02

## 2024-08-02 NOTE — TELEPHONE ENCOUNTER
Stop furosemide    Start bumex 0.5mg daily    Recheck in office in 2 weeks after switch.  We need to check that the dose is effective for her.

## 2024-08-02 NOTE — TELEPHONE ENCOUNTER
Patient calling with complaints of itching. She said she is itching all over. It comes and goes and she thinks it is from the furosemide. She states that when she takes it in the evening she wakes up in the middle of the night itching all over. No rash / bumps noted     Yesterday she only took one pill and states she is not going to take it at all today to see if it makes a difference.     She is asking if you think this is causing the itching or another medication.     If furosemide, can she just take PRN?     Please advise.

## 2024-08-13 ENCOUNTER — TELEPHONE (OUTPATIENT)
Dept: FAMILY MEDICINE CLINIC | Age: 84
End: 2024-08-13

## 2024-08-13 NOTE — TELEPHONE ENCOUNTER
FYI   Patient states that she started the bumex on Monday (she was out of town over the weekend and did not want to start) She says she noticed that her blood sugars were elevated after starting that.     She went back to furosemide and stated that she would deal with the itching vs. Elevated glucose.

## 2024-08-21 DIAGNOSIS — M62.838 TRAPEZIUS MUSCLE SPASM: ICD-10-CM

## 2024-08-26 NOTE — TELEPHONE ENCOUNTER
Per med change note on 8/2 I would like to have the swelling checked and BP checked with the med changes.  Is she able to come in prior to her AWV to check this?

## 2024-08-27 RX ORDER — BUMETANIDE 0.5 MG/1
0.5 TABLET ORAL DAILY
Qty: 90 TABLET | Refills: 0 | OUTPATIENT
Start: 2024-08-27

## 2024-08-27 NOTE — TELEPHONE ENCOUNTER
Per telephone encounter on 8/13/24   Patient informed the office that she has decided to go back on the Lasix due to elevated blood sugars while on Bumex     Refill / 90 day supply denied

## 2024-09-04 DIAGNOSIS — I48.91 ATRIAL FIBRILLATION, UNSPECIFIED TYPE (HCC): ICD-10-CM

## 2024-09-04 DIAGNOSIS — K21.9 GASTROESOPHAGEAL REFLUX DISEASE WITHOUT ESOPHAGITIS: ICD-10-CM

## 2024-09-04 DIAGNOSIS — E11.9 TYPE 2 DIABETES MELLITUS WITHOUT COMPLICATION, WITHOUT LONG-TERM CURRENT USE OF INSULIN (HCC): ICD-10-CM

## 2024-09-04 RX ORDER — APIXABAN 5 MG/1
5 TABLET, FILM COATED ORAL 2 TIMES DAILY
Qty: 180 TABLET | Refills: 0 | OUTPATIENT
Start: 2024-09-04

## 2024-09-04 RX ORDER — BUMETANIDE 0.5 MG/1
0.5 TABLET ORAL DAILY
Qty: 30 TABLET | Refills: 0 | OUTPATIENT
Start: 2024-09-04

## 2024-09-04 RX ORDER — ALBUTEROL SULFATE 90 UG/1
AEROSOL, METERED RESPIRATORY (INHALATION)
Qty: 18 EACH | Refills: 0 | OUTPATIENT
Start: 2024-09-04

## 2024-09-04 RX ORDER — ALBUTEROL SULFATE 90 UG/1
AEROSOL, METERED RESPIRATORY (INHALATION)
Qty: 18 EACH | Refills: 2 | Status: SHIPPED | OUTPATIENT
Start: 2024-09-04

## 2024-09-04 RX ORDER — BLOOD SUGAR DIAGNOSTIC
STRIP MISCELLANEOUS
Qty: 100 STRIP | Refills: 5 | Status: SHIPPED | OUTPATIENT
Start: 2024-09-04

## 2024-09-04 RX ORDER — MECOBALAMIN 5000 MCG
30 TABLET,DISINTEGRATING ORAL DAILY
Qty: 180 CAPSULE | Refills: 1 | Status: SHIPPED | OUTPATIENT
Start: 2024-09-04

## 2024-10-01 RX ORDER — BUMETANIDE 0.5 MG/1
0.5 TABLET ORAL DAILY
Qty: 30 TABLET | Refills: 0 | Status: SHIPPED
Start: 2024-10-01 | End: 2024-10-30 | Stop reason: SDUPTHER

## 2024-10-01 NOTE — TELEPHONE ENCOUNTER
Last Appointment:  7/17/2024  Future Appointments   Date Time Provider Department Center   11/1/2024 11:00 AM Danial Tovar MD [facility] DE[facility]

## 2024-10-01 NOTE — TELEPHONE ENCOUNTER
I sent the bumex, but as I look I think this needs clarification.  If you read the recent telephone encounters it seems she didn't like it and moved back to lasix.  She can use either but not both.  Please confirm with patient and cancel the unnecessary script.     She needs to bring meds to appt next month for accurate med rec.

## 2024-10-30 RX ORDER — BUMETANIDE 0.5 MG/1
0.5 TABLET ORAL DAILY
Qty: 90 TABLET | Refills: 1 | OUTPATIENT
Start: 2024-10-30

## 2024-11-01 ENCOUNTER — OFFICE VISIT (OUTPATIENT)
Dept: FAMILY MEDICINE CLINIC | Age: 84
End: 2024-11-01

## 2024-11-01 VITALS
SYSTOLIC BLOOD PRESSURE: 121 MMHG | HEIGHT: 62 IN | DIASTOLIC BLOOD PRESSURE: 89 MMHG | WEIGHT: 193 LBS | OXYGEN SATURATION: 98 % | HEART RATE: 86 BPM | RESPIRATION RATE: 18 BRPM | BODY MASS INDEX: 35.51 KG/M2 | TEMPERATURE: 97.6 F

## 2024-11-01 DIAGNOSIS — I48.21 PERMANENT ATRIAL FIBRILLATION (HCC): ICD-10-CM

## 2024-11-01 DIAGNOSIS — K21.9 GASTROESOPHAGEAL REFLUX DISEASE WITHOUT ESOPHAGITIS: Chronic | ICD-10-CM

## 2024-11-01 DIAGNOSIS — I10 ESSENTIAL HYPERTENSION: Chronic | ICD-10-CM

## 2024-11-01 DIAGNOSIS — E11.9 TYPE 2 DIABETES MELLITUS WITHOUT COMPLICATION, WITHOUT LONG-TERM CURRENT USE OF INSULIN (HCC): ICD-10-CM

## 2024-11-01 DIAGNOSIS — Z00.00 MEDICARE ANNUAL WELLNESS VISIT, SUBSEQUENT: Primary | ICD-10-CM

## 2024-11-01 DIAGNOSIS — E78.2 MIXED HYPERLIPIDEMIA: Chronic | ICD-10-CM

## 2024-11-01 LAB
ALBUMIN: 4.3 G/DL (ref 3.5–5.2)
ALP BLD-CCNC: 235 U/L (ref 35–104)
ALT SERPL-CCNC: 22 U/L (ref 0–32)
ANION GAP SERPL CALCULATED.3IONS-SCNC: 16 MMOL/L (ref 7–16)
AST SERPL-CCNC: 37 U/L (ref 0–31)
BILIRUB SERPL-MCNC: 0.6 MG/DL (ref 0–1.2)
BUN BLDV-MCNC: 14 MG/DL (ref 6–23)
CALCIUM SERPL-MCNC: 9.9 MG/DL (ref 8.6–10.2)
CHLORIDE BLD-SCNC: 99 MMOL/L (ref 98–107)
CHOLESTEROL, FASTING: 197 MG/DL
CO2: 24 MMOL/L (ref 22–29)
CREAT SERPL-MCNC: 0.7 MG/DL (ref 0.5–1)
GFR, ESTIMATED: 87 ML/MIN/1.73M2
GLUCOSE BLD-MCNC: 139 MG/DL (ref 74–99)
HBA1C MFR BLD: 7.5 % (ref 4–5.6)
HCT VFR BLD CALC: 46.2 % (ref 34–48)
HDLC SERPL-MCNC: 35 MG/DL
HEMOGLOBIN: 14.9 G/DL (ref 11.5–15.5)
LDL CHOLESTEROL: 122 MG/DL
MCH RBC QN AUTO: 32.2 PG (ref 26–35)
MCHC RBC AUTO-ENTMCNC: 32.3 G/DL (ref 32–34.5)
MCV RBC AUTO: 99.8 FL (ref 80–99.9)
PDW BLD-RTO: 13.3 % (ref 11.5–15)
PLATELET # BLD: 238 K/UL (ref 130–450)
PMV BLD AUTO: 12.8 FL (ref 7–12)
POTASSIUM SERPL-SCNC: 4.2 MMOL/L (ref 3.5–5)
RBC # BLD: 4.63 M/UL (ref 3.5–5.5)
SODIUM BLD-SCNC: 139 MMOL/L (ref 132–146)
TOTAL PROTEIN: 8.4 G/DL (ref 6.4–8.3)
TRIGLYCERIDE, FASTING: 202 MG/DL
TSH SERPL DL<=0.05 MIU/L-ACNC: 1.82 UIU/ML (ref 0.27–4.2)
VLDLC SERPL CALC-MCNC: 40 MG/DL
WBC # BLD: 8.4 K/UL (ref 4.5–11.5)

## 2024-11-01 RX ORDER — BUMETANIDE 0.5 MG/1
0.5 TABLET ORAL DAILY
Qty: 30 TABLET | Refills: 2 | Status: SHIPPED | OUTPATIENT
Start: 2024-11-01

## 2024-11-01 SDOH — ECONOMIC STABILITY: FOOD INSECURITY: WITHIN THE PAST 12 MONTHS, THE FOOD YOU BOUGHT JUST DIDN'T LAST AND YOU DIDN'T HAVE MONEY TO GET MORE.: NEVER TRUE

## 2024-11-01 SDOH — ECONOMIC STABILITY: INCOME INSECURITY: HOW HARD IS IT FOR YOU TO PAY FOR THE VERY BASICS LIKE FOOD, HOUSING, MEDICAL CARE, AND HEATING?: NOT HARD AT ALL

## 2024-11-01 SDOH — ECONOMIC STABILITY: FOOD INSECURITY: WITHIN THE PAST 12 MONTHS, YOU WORRIED THAT YOUR FOOD WOULD RUN OUT BEFORE YOU GOT MONEY TO BUY MORE.: NEVER TRUE

## 2024-11-01 ASSESSMENT — PATIENT HEALTH QUESTIONNAIRE - PHQ9
SUM OF ALL RESPONSES TO PHQ QUESTIONS 1-9: 0
SUM OF ALL RESPONSES TO PHQ9 QUESTIONS 1 & 2: 0
SUM OF ALL RESPONSES TO PHQ QUESTIONS 1-9: 0
1. LITTLE INTEREST OR PLEASURE IN DOING THINGS: NOT AT ALL
SUM OF ALL RESPONSES TO PHQ QUESTIONS 1-9: 0
2. FEELING DOWN, DEPRESSED OR HOPELESS: NOT AT ALL
SUM OF ALL RESPONSES TO PHQ QUESTIONS 1-9: 0

## 2024-11-01 ASSESSMENT — LIFESTYLE VARIABLES
HOW MANY STANDARD DRINKS CONTAINING ALCOHOL DO YOU HAVE ON A TYPICAL DAY: PATIENT DOES NOT DRINK
HOW OFTEN DO YOU HAVE A DRINK CONTAINING ALCOHOL: NEVER

## 2024-11-01 NOTE — PATIENT INSTRUCTIONS
11/1/2024  Medicare offers a range of preventive health benefits. Some of the tests and screenings are paid in full while other may be subject to a deductible, co-insurance, and/or copay.    Some of these benefits include a comprehensive review of your medical history including lifestyle, illnesses that may run in your family, and various assessments and screenings as appropriate.    After reviewing your medical record and screening and assessments performed today your provider may have ordered immunizations, labs, imaging, and/or referrals for you.  A list of these orders (if applicable) as well as your Preventive Care list are included within your After Visit Summary for your review.    Other Preventive Recommendations:    A preventive eye exam performed by an eye specialist is recommended every 1-2 years to screen for glaucoma; cataracts, macular degeneration, and other eye disorders.  A preventive dental visit is recommended every 6 months.  Try to get at least 150 minutes of exercise per week or 10,000 steps per day on a pedometer .  Order or download the FREE \"Exercise & Physical Activity: Your Everyday Guide\" from [employer]. Call 1-991.497.8202 or search [employer] online.  You need 7111-0459 mg of calcium and 1889-4821 IU of vitamin D per day. It is possible to meet your calcium requirement with diet alone, but a vitamin D supplement is usually necessary to meet this goal.  When exposed to the sun, use a sunscreen that protects against both UVA and UVB radiation with an SPF of 30 or greater. Reapply every 2 to 3 hours or after sweating, drying off with a towel, or swimming.  Always wear a seat belt when traveling in a car. Always wear a helmet when riding a bicycle or motorcycle.

## 2024-11-01 NOTE — PROGRESS NOTES
Medicare Annual Wellness Visit    Janny Harkins is here for Annual Exam (Concerned about cognitive decline.) and Health Maintenance (Has gotten flu, covid and RSV at [facility] will get records.)    Assessment & Plan   Medicare annual wellness visit, subsequent  Mixed hyperlipidemia  -     Lipid, Fasting  Essential hypertension  -     CBC  -     Comprehensive Metabolic Panel  -     TSH  Gastroesophageal reflux disease without esophagitis  Permanent atrial fibrillation (HCC)  Type 2 diabetes mellitus without complication, without long-term current use of insulin (HCC)  -     Hemoglobin A1C  Recommendations for Preventive Services Due: see orders and patient instructions/AVS.  Recommended screening schedule for the next 5-10 years is provided to the patient in written form: see Patient Instructions/AVS.     Return for Medicare Annual Wellness Visit in 1 year.     Subjective   The following acute and/or chronic problems were also addressed today:  HTN - no CV sx, tolerating meds  GERD - stable sx  HPL - stable  Afib - rate controlled, no bleeding with DOAC.    DM - no issues reported.   LE swelling - has occasional swelling, usually better in the AM.      Patient's complete Health Risk Assessment and screening values have been reviewed and are found in Flowsheets. The following problems were reviewed today and where indicated follow up appointments were made and/or referrals ordered.    Positive Risk Factor Screenings with Interventions:                Inactivity:  On average, how many days per week do you engage in moderate to strenuous exercise (like a brisk walk)?: 0 days (!) Abnormal  On average, how many minutes do you engage in exercise at this level?: 0 min  Interventions:  She as active as possible.      Abnormal BMI (obese):  Body mass index is 35.3 kg/m². (!) Abnormal  Interventions:  Patient advised to follow-up in this office for further evaluation and treatment          Vision Screen:  Do you have

## 2024-11-03 DIAGNOSIS — E11.9 TYPE 2 DIABETES MELLITUS WITHOUT COMPLICATION, WITHOUT LONG-TERM CURRENT USE OF INSULIN (HCC): ICD-10-CM

## 2024-11-03 DIAGNOSIS — R74.8 ALKALINE PHOSPHATASE ELEVATION: Primary | ICD-10-CM

## 2024-11-04 ENCOUNTER — TELEPHONE (OUTPATIENT)
Dept: FAMILY MEDICINE CLINIC | Age: 84
End: 2024-11-04

## 2024-11-04 DIAGNOSIS — E11.9 TYPE 2 DIABETES MELLITUS WITHOUT COMPLICATION, WITHOUT LONG-TERM CURRENT USE OF INSULIN (HCC): ICD-10-CM

## 2024-11-04 DIAGNOSIS — I10 ESSENTIAL HYPERTENSION: Primary | ICD-10-CM

## 2024-11-04 DIAGNOSIS — R74.8 ALKALINE PHOSPHATASE ELEVATION: ICD-10-CM

## 2024-11-11 ENCOUNTER — TELEPHONE (OUTPATIENT)
Dept: FAMILY MEDICINE CLINIC | Age: 84
End: 2024-11-11

## 2024-11-11 NOTE — TELEPHONE ENCOUNTER
Pt calling with questions/concerns about medication that she doesn't know why it was prescribed (bumetanide). Says she hasn't ever taken it before and doesn't know why it keeps being prescribed to her and she's afraid of taking it with her other medications.    Please return pt call

## 2024-11-14 NOTE — TELEPHONE ENCOUNTER
Pt returned call, stating that she is not comfortable taking the Bumex as it has never been discussed with her before. Pt also states she is fine waiting until her next appointment to discuss this further.

## 2024-11-14 NOTE — TELEPHONE ENCOUNTER
This has been discussed - it/ is for her leg swelling, it replaced her furosemide due to concerns with itching.  Either (but not both) can be used PRN for swelling.  See note from 8/2/24.      She needs appt with all meds present.  I do wonder if she is having confusion with meds. This IDEALLY would be when clinical pharmacy is present to assist.

## 2024-11-26 ENCOUNTER — TELEPHONE (OUTPATIENT)
Dept: FAMILY MEDICINE CLINIC | Age: 84
End: 2024-11-26

## 2024-11-26 NOTE — TELEPHONE ENCOUNTER
Patient calling with complaints of dry mouth and nose, sometimes lasting all day. She has been using mints/lozenges to help with the dry mouth.     No other symptoms    She is wondering if this is a result from a medication she is on?     Please advise.

## 2024-11-26 NOTE — TELEPHONE ENCOUNTER
This could also be the dry heat in the house.  Consider a humidifier if air in the house is dry.  Consider biotene mouth rinse to help.  Avoid menthol (cough drops).     Try to figure out if meds changed around the time you noticed symptoms.  I will probably defer this to clinical pharm.  Please forward to pharmacy resident when they join our office (typically once a week) so the response to this portion may take a week or more.

## 2024-12-09 ENCOUNTER — TELEPHONE (OUTPATIENT)
Dept: FAMILY MEDICINE CLINIC | Age: 84
End: 2024-12-09

## 2024-12-09 NOTE — TELEPHONE ENCOUNTER
Patient calling wanting to know about starting Ozempic. She said her sugars have been running high   FBS today was 172    Her last A1C is elevated from the time before, she is due for repeat labs in February.     I did advise her that her insurance may not cover the shot because she has not tried any oral medications. Patient understands.     If agreeable, please order Ozempic to [facility] in [place]     Thank you

## 2024-12-10 NOTE — TELEPHONE ENCOUNTER
Could she check GLC 3x per day.  AM prior to food, Prior to Bed and one other random time during the day. (Please reports how many hours since prior meal)    MSG this next week and we can make a decision on ozempic.     Thanks,

## 2024-12-30 ENCOUNTER — OFFICE VISIT (OUTPATIENT)
Dept: FAMILY MEDICINE CLINIC | Age: 84
End: 2024-12-30
Payer: MEDICARE

## 2024-12-30 VITALS
BODY MASS INDEX: 32.78 KG/M2 | HEIGHT: 64 IN | SYSTOLIC BLOOD PRESSURE: 122 MMHG | HEART RATE: 72 BPM | DIASTOLIC BLOOD PRESSURE: 84 MMHG | WEIGHT: 192 LBS | TEMPERATURE: 97.6 F

## 2024-12-30 DIAGNOSIS — E11.9 TYPE 2 DIABETES MELLITUS WITHOUT COMPLICATION, WITHOUT LONG-TERM CURRENT USE OF INSULIN (HCC): ICD-10-CM

## 2024-12-30 DIAGNOSIS — I10 ESSENTIAL HYPERTENSION: Primary | ICD-10-CM

## 2024-12-30 PROCEDURE — 99213 OFFICE O/P EST LOW 20 MIN: CPT

## 2024-12-30 PROCEDURE — 3079F DIAST BP 80-89 MM HG: CPT

## 2024-12-30 PROCEDURE — 1123F ACP DISCUSS/DSCN MKR DOCD: CPT

## 2024-12-30 PROCEDURE — 3074F SYST BP LT 130 MM HG: CPT

## 2024-12-30 PROCEDURE — 3051F HG A1C>EQUAL 7.0%<8.0%: CPT

## 2024-12-30 PROCEDURE — 1159F MED LIST DOCD IN RCRD: CPT

## 2024-12-30 RX ORDER — FEXOFENADINE HCL 180 MG/1
180 TABLET ORAL DAILY
COMMUNITY

## 2024-12-30 RX ORDER — ASCORBIC ACID 500 MG
500 TABLET ORAL DAILY
COMMUNITY

## 2024-12-30 RX ORDER — BUMETANIDE 0.5 MG/1
0.5 TABLET ORAL DAILY
Qty: 90 TABLET | OUTPATIENT
Start: 2024-12-30

## 2024-12-30 RX ORDER — FUROSEMIDE 20 MG/1
20 TABLET ORAL DAILY
COMMUNITY

## 2024-12-30 ASSESSMENT — ENCOUNTER SYMPTOMS
DIARRHEA: 0
SHORTNESS OF BREATH: 0
CONSTIPATION: 0
COUGH: 0

## 2024-12-30 NOTE — PROGRESS NOTES
S: 84 y.o. female with   Chief Complaint   Patient presents with    Medication Reconciliation     Confusion about water pill/diuretic        GERD/CHF/HTN - reviewed meds with pharamcy - clarified diuretics.  GLC stable with her goal A1C <7.5 given age and risk factors.  Considering weaning PPI     O: VS:  height is 1.619 m (5' 3.75\") and weight is 87.1 kg (192 lb). Her temporal temperature is 97.6 °F (36.4 °C). Her blood pressure is 122/84 and her pulse is 72.   BP Readings from Last 3 Encounters:   12/30/24 122/84   11/01/24 121/89   07/17/24 (!) 141/98     See resident note      Impression/Plan:   1) CHF - discussed diuretic doses - will keep lasix and stop bumex.    2) GERD - consider weaning PPI   3) DM - stable.       Health Maintenance Due   Topic Date Due    Respiratory Syncytial Virus (RSV) Pregnant or age 60 yrs+ (1 - 1-dose 75+ series) Never done    Diabetic Alb to Cr ratio (uACR) test  12/30/2020    Flu vaccine (1) 08/01/2024    COVID-19 Vaccine (6 - 2023-24 season) 09/01/2024         Attending Physician Statement  I have discussed the case, including pertinent history and exam findings with the resident.  I agree with the documented assessment and plan.      Danial Tovar MD

## 2024-12-30 NOTE — PROGRESS NOTES
[facility]  Department of Family Medicine  Family Medicine Residency Program      Patient: Janny Harkins 84 y.o. female     Date of Service: 12/30/24      Chief complaint:   Chief Complaint   Patient presents with    Medication Reconciliation     Confusion about water pill/diuretic        HISTORY OF PRESENTING ILLNESS     84 y.o. female presented to the clinic for a medication reconcilliation.    Diabetes mellitus  Patient continues diabetic medication regimen.  Morning sugars 140/220   Diabetes is poorly adequately controlled.  Patient reports no  hypoglycemic episodes.   Is on ACE/ARB         Lab Results   Component Value Date    LABA1C 7.5 (H) 11/01/2024    LABA1C 6.7 01/22/2024    LABA1C 6.7 09/25/2023               Health Maintenance:  Health Maintenance Due   Topic Date Due    Respiratory Syncytial Virus (RSV) Pregnant or age 60 yrs+ (1 - 1-dose 75+ series) Never done    Diabetic Alb to Cr ratio (uACR) test  12/30/2020    Flu vaccine (1) 08/01/2024    COVID-19 Vaccine (6 - 2023-24 season) 09/01/2024     Past Medical History:      Diagnosis Date    Afib (HCC)     Arthritis     Bilateral carpal tunnel syndrome     right more than left    Diabetes mellitus (HCC)     diet controlled & cinnamon    DM2 (diabetes mellitus, type 2) (HCC) 02/16/2012    patient states not diabetic, family hx of diabetes; A1C-6.2    Endometrial cancer (HCC) 1992    early small cured    GERD (gastroesophageal reflux disease)     HTN (hypertension) 02/16/2012    Hyperlipidemia     borderline- no meds    Hypertension     controlled    Obesity 05/28/2013    Osteoarthritis     Osteoarthritis of right knee     Morris    Sinus drainage     seasonal (winter)    Spondylosis of lumbosacral joint     Voice absence     only with anxiety     Past Surgical History:        Procedure Laterality Date    CATARACT REMOVAL  2022    COLONOSCOPY  [address]    1 hyperplastic polyp 2mm    HYSTERECTOMY (CERVIX STATUS UNKNOWN)

## 2025-01-27 RX ORDER — BUMETANIDE 0.5 MG/1
0.5 TABLET ORAL DAILY
Qty: 90 TABLET | OUTPATIENT
Start: 2025-01-27

## 2025-01-29 ENCOUNTER — OFFICE VISIT (OUTPATIENT)
Dept: CARDIOLOGY CLINIC | Age: 85
End: 2025-01-29
Payer: MEDICARE

## 2025-01-29 VITALS
OXYGEN SATURATION: 96 % | WEIGHT: 191.3 LBS | DIASTOLIC BLOOD PRESSURE: 62 MMHG | SYSTOLIC BLOOD PRESSURE: 118 MMHG | HEART RATE: 88 BPM | TEMPERATURE: 96.8 F | BODY MASS INDEX: 33.89 KG/M2 | HEIGHT: 63 IN | RESPIRATION RATE: 14 BRPM

## 2025-01-29 DIAGNOSIS — I10 ESSENTIAL HYPERTENSION: ICD-10-CM

## 2025-01-29 DIAGNOSIS — I48.21 PERMANENT ATRIAL FIBRILLATION (HCC): ICD-10-CM

## 2025-01-29 DIAGNOSIS — E78.2 MIXED HYPERLIPIDEMIA: Chronic | ICD-10-CM

## 2025-01-29 DIAGNOSIS — Z01.810 PREOP CARDIOVASCULAR EXAM: Primary | ICD-10-CM

## 2025-01-29 PROCEDURE — 1159F MED LIST DOCD IN RCRD: CPT | Performed by: INTERNAL MEDICINE

## 2025-01-29 PROCEDURE — 93000 ELECTROCARDIOGRAM COMPLETE: CPT | Performed by: INTERNAL MEDICINE

## 2025-01-29 PROCEDURE — 3074F SYST BP LT 130 MM HG: CPT | Performed by: INTERNAL MEDICINE

## 2025-01-29 PROCEDURE — 1123F ACP DISCUSS/DSCN MKR DOCD: CPT | Performed by: INTERNAL MEDICINE

## 2025-01-29 PROCEDURE — 1160F RVW MEDS BY RX/DR IN RCRD: CPT | Performed by: INTERNAL MEDICINE

## 2025-01-29 PROCEDURE — 3078F DIAST BP <80 MM HG: CPT | Performed by: INTERNAL MEDICINE

## 2025-01-29 PROCEDURE — 99214 OFFICE O/P EST MOD 30 MIN: CPT | Performed by: INTERNAL MEDICINE

## 2025-01-29 NOTE — PROGRESS NOTES
Patient Active Problem List   Diagnosis    Osteoarthritis of right knee    GERD (gastroesophageal reflux disease)    Spondylosis of lumbosacral joint    Obesity    Essential hypertension    Mixed hyperlipidemia    Type 2 diabetes mellitus without complication (HCC)    Permanent atrial fibrillation (HCC)    Chronic anticoagulation    Secondary hypercoagulable state (HCC)       Current Outpatient Medications   Medication Sig Dispense Refill    fexofenadine (ALLEGRA) 180 MG tablet Take 1 tablet by mouth daily      vitamin C (ASCORBIC ACID) 500 MG tablet Take 1 tablet by mouth daily With shanelle hips powder 10 mg      furosemide (LASIX) 20 MG tablet Take 1 tablet by mouth daily      blood glucose test strips (ONETOUCH ULTRA) strip TEST ONE TIME A DAY & AS NEEDED FOR SYMPTOMS OF IRREGULAR BLOOD GLUCOSE 100 strip 5    lansoprazole (PREVACID) 15 MG delayed release capsule TAKE 2 CAPSULES BY MOUTH DAILY 30-60MIN PRIOR TO YOUR FIRST MEAL 180 capsule 1    albuterol sulfate HFA (PROVENTIL;VENTOLIN;PROAIR) 108 (90 Base) MCG/ACT inhaler TAKE 2 PUFFS BY MOUTH EVERY 6 HOURS AS NEEDED FOR WHEEZE 18 each 2    apixaban (ELIQUIS) 5 MG TABS tablet Take 1 tablet by mouth 2 times daily 180 tablet 1    diclofenac sodium (VOLTAREN) 1 % GEL APPLY 2 GRAMS TOPICALLY 4 TIMES A  g 2    irbesartan (AVAPRO) 150 MG tablet Take 1 tablet by mouth daily 90 tablet 1    metoprolol succinate (TOPROL XL) 50 MG extended release tablet TAKE 1 TABLET BY MOUTH IN THE MORNING AND IN THE EVENING 180 tablet 2    blood glucose monitor kit and supplies Blood glucose monitor - Currently uses one touch ultra - may substitute brand per insurance preference. 1 kit 0    Vitamin B-12 (CYANOCOBALAMIN) 5000 MCG disintegrating tablet Take 1 tablet by mouth daily      Alcohol Swabs PADS 1 each by Does not apply route 4 times daily 100 each 5    BETA CAROTENE PO Take 1 tablet by mouth daily 7500 mcg daily      CRANBERRY PO Take 2 tablets by mouth daily 168 mg with 40 mg

## 2025-01-29 NOTE — PROGRESS NOTES
Samples of Eliquis 5mg given to patient (4 boxes).     Lot:  [id number]  Exp: 3/2026    Randa Green CMA

## 2025-02-12 ENCOUNTER — TELEPHONE (OUTPATIENT)
Dept: FAMILY MEDICINE CLINIC | Age: 85
End: 2025-02-12

## 2025-02-12 NOTE — TELEPHONE ENCOUNTER
BROOKM to call back to schedule appointment. Attempted all numbers listed. Spoke with alternative contact Jose, he stated he would reach out to the patient to call back.

## 2025-02-20 NOTE — TELEPHONE ENCOUNTER
Attempted another call, no answer, no previous callback. Called [facility] center - surgery was cancelled by patient.

## 2025-02-25 DIAGNOSIS — K21.9 GASTROESOPHAGEAL REFLUX DISEASE WITHOUT ESOPHAGITIS: ICD-10-CM

## 2025-02-26 NOTE — TELEPHONE ENCOUNTER
Name of Medication(s) Requested:  Requested Prescriptions     Pending Prescriptions Disp Refills    lansoprazole (PREVACID) 15 MG delayed release capsule [Pharmacy Med Name: LANSOPRAZOLE DR 15 MG CAPSULE] 180 capsule 1     Sig: TAKE 2 CAPSULES BY MOUTH DAILY 30-60MIN PRIOR TO YOUR FIRST MEAL       Medication is on current medication list Yes    Dosage and directions were verified? Yes    Quantity verified: 90 day supply     Pharmacy Verified?  Yes    Last Appointment:  11/1/2024    Future appts:  Future Appointments   Date Time Provider Department Center   3/6/2025  9:30 AM Danial Tovar MD [facility]   5/12/2025 11:00 AM Danial Tovar MD [facility]   11/10/2025 11:00 AM [facility]   2/3/2026 11:00 AM Rodrigo Jerry MD [facility]        (If no appt send self scheduling link. .REFILLAPPT)  Scheduling request sent?     [] Yes  [x] No    Does patient need updated?  [] Yes  [x] No

## 2025-02-27 RX ORDER — MECOBALAMIN 5000 MCG
30 TABLET,DISINTEGRATING ORAL DAILY
Qty: 180 CAPSULE | Refills: 1 | Status: SHIPPED | OUTPATIENT
Start: 2025-02-27

## 2025-03-03 ENCOUNTER — HOSPITAL ENCOUNTER (OUTPATIENT)
Age: 85
Discharge: HOME | End: 2025-03-03
Payer: MEDICARE

## 2025-03-03 DIAGNOSIS — E11.9 TYPE 2 DIABETES MELLITUS WITHOUT COMPLICATION, WITHOUT LONG-TERM CURRENT USE OF INSULIN (HCC): ICD-10-CM

## 2025-03-03 DIAGNOSIS — R74.8 ALKALINE PHOSPHATASE ELEVATION: ICD-10-CM

## 2025-03-03 LAB
ALBUMIN SERPL-MCNC: 4 G/DL (ref 3.5–5.2)
ALP SERPL-CCNC: 163 U/L (ref 35–104)
ALT SERPL-CCNC: 16 U/L (ref 0–32)
ANION GAP SERPL CALCULATED.3IONS-SCNC: 13 MMOL/L (ref 7–16)
AST SERPL-CCNC: 23 U/L (ref 0–31)
BILIRUB SERPL-MCNC: 0.5 MG/DL (ref 0–1.2)
BUN SERPL-MCNC: 14 MG/DL (ref 6–23)
CALCIUM SERPL-MCNC: 9.4 MG/DL (ref 8.6–10.2)
CHLORIDE SERPL-SCNC: 103 MMOL/L (ref 98–107)
CO2 SERPL-SCNC: 24 MMOL/L (ref 22–29)
CREAT SERPL-MCNC: 0.9 MG/DL (ref 0.5–1)
GFR, ESTIMATED: 68 ML/MIN/1.73M2
GGT SERPL-CCNC: 121 U/L (ref 6–42)
GLUCOSE SERPL-MCNC: 152 MG/DL (ref 74–99)
HBA1C MFR BLD: 6.9 % (ref 4–5.6)
POTASSIUM SERPL-SCNC: 4.2 MMOL/L (ref 3.5–5)
PROT SERPL-MCNC: 8 G/DL (ref 6.4–8.3)
SODIUM SERPL-SCNC: 140 MMOL/L (ref 132–146)

## 2025-03-03 PROCEDURE — 82977 ASSAY OF GGT: CPT

## 2025-03-03 PROCEDURE — 36415 COLL VENOUS BLD VENIPUNCTURE: CPT

## 2025-03-03 PROCEDURE — 83036 HEMOGLOBIN GLYCOSYLATED A1C: CPT

## 2025-03-03 PROCEDURE — 80053 COMPREHEN METABOLIC PANEL: CPT

## 2025-03-06 ENCOUNTER — OFFICE VISIT (OUTPATIENT)
Dept: FAMILY MEDICINE CLINIC | Age: 85
End: 2025-03-06
Payer: MEDICARE

## 2025-03-06 ENCOUNTER — TELEPHONE (OUTPATIENT)
Dept: FAMILY MEDICINE CLINIC | Age: 85
End: 2025-03-06

## 2025-03-06 VITALS
WEIGHT: 189 LBS | HEART RATE: 89 BPM | SYSTOLIC BLOOD PRESSURE: 118 MMHG | DIASTOLIC BLOOD PRESSURE: 81 MMHG | OXYGEN SATURATION: 96 % | HEIGHT: 63 IN | TEMPERATURE: 96.8 F | RESPIRATION RATE: 16 BRPM | BODY MASS INDEX: 33.49 KG/M2

## 2025-03-06 DIAGNOSIS — Z91.81 AT HIGH RISK FOR INJURY RELATED TO FALL: ICD-10-CM

## 2025-03-06 DIAGNOSIS — R74.8 ALKALINE PHOSPHATASE ELEVATION: ICD-10-CM

## 2025-03-06 DIAGNOSIS — E11.9 TYPE 2 DIABETES MELLITUS WITHOUT COMPLICATION, WITHOUT LONG-TERM CURRENT USE OF INSULIN (HCC): ICD-10-CM

## 2025-03-06 DIAGNOSIS — I48.91 ATRIAL FIBRILLATION, UNSPECIFIED TYPE (HCC): ICD-10-CM

## 2025-03-06 DIAGNOSIS — I10 ESSENTIAL HYPERTENSION: ICD-10-CM

## 2025-03-06 DIAGNOSIS — Z00.00 MEDICARE ANNUAL WELLNESS VISIT, SUBSEQUENT: Primary | ICD-10-CM

## 2025-03-06 DIAGNOSIS — R26.89 BALANCE PROBLEM: ICD-10-CM

## 2025-03-06 DIAGNOSIS — R74.8 ELEVATED SERUM GGT LEVEL: Primary | ICD-10-CM

## 2025-03-06 PROCEDURE — G0439 PPPS, SUBSEQ VISIT: HCPCS | Performed by: FAMILY MEDICINE

## 2025-03-06 PROCEDURE — 1123F ACP DISCUSS/DSCN MKR DOCD: CPT | Performed by: FAMILY MEDICINE

## 2025-03-06 PROCEDURE — 1159F MED LIST DOCD IN RCRD: CPT | Performed by: FAMILY MEDICINE

## 2025-03-06 PROCEDURE — 3074F SYST BP LT 130 MM HG: CPT | Performed by: FAMILY MEDICINE

## 2025-03-06 PROCEDURE — 3044F HG A1C LEVEL LT 7.0%: CPT | Performed by: FAMILY MEDICINE

## 2025-03-06 PROCEDURE — 3079F DIAST BP 80-89 MM HG: CPT | Performed by: FAMILY MEDICINE

## 2025-03-06 RX ORDER — BLOOD SUGAR DIAGNOSTIC
STRIP MISCELLANEOUS
Qty: 100 STRIP | Refills: 5 | Status: SHIPPED | OUTPATIENT
Start: 2025-03-06

## 2025-03-06 RX ORDER — METOPROLOL SUCCINATE 50 MG/1
TABLET, EXTENDED RELEASE ORAL
Qty: 180 TABLET | Refills: 1 | Status: SHIPPED | OUTPATIENT
Start: 2025-03-06

## 2025-03-06 RX ORDER — BLOOD PRESSURE TEST KIT
1 KIT MISCELLANEOUS 4 TIMES DAILY
Qty: 100 EACH | Refills: 5 | Status: SHIPPED | OUTPATIENT
Start: 2025-03-06

## 2025-03-06 RX ORDER — IRBESARTAN 150 MG/1
150 TABLET ORAL DAILY
Qty: 90 TABLET | Refills: 1 | Status: SHIPPED | OUTPATIENT
Start: 2025-03-06

## 2025-03-06 RX ORDER — MULTIVITAMIN
TABLET ORAL
Qty: 1 TABLET | Refills: 0
Start: 2025-03-06

## 2025-03-06 SDOH — ECONOMIC STABILITY: FOOD INSECURITY: WITHIN THE PAST 12 MONTHS, YOU WORRIED THAT YOUR FOOD WOULD RUN OUT BEFORE YOU GOT MONEY TO BUY MORE.: NEVER TRUE

## 2025-03-06 SDOH — ECONOMIC STABILITY: FOOD INSECURITY: WITHIN THE PAST 12 MONTHS, THE FOOD YOU BOUGHT JUST DIDN'T LAST AND YOU DIDN'T HAVE MONEY TO GET MORE.: NEVER TRUE

## 2025-03-06 ASSESSMENT — PATIENT HEALTH QUESTIONNAIRE - PHQ9
2. FEELING DOWN, DEPRESSED OR HOPELESS: NOT AT ALL
SUM OF ALL RESPONSES TO PHQ QUESTIONS 1-9: 0
1. LITTLE INTEREST OR PLEASURE IN DOING THINGS: NOT AT ALL

## 2025-03-06 NOTE — PROGRESS NOTES
Medicare Annual Wellness Visit    Janny Harkins is here for Diabetes (Interested in Ozempic), Discuss Labs, and Medicare AWV    Assessment & Plan   Medicare annual wellness visit, subsequent  Type 2 diabetes mellitus without complication, without long-term current use of insulin (HCC)  -     blood glucose test strips (ONETOUCH ULTRA) strip; TEST ONE TIME A DAY & AS NEEDED FOR SYMPTOMS OF IRREGULAR BLOOD GLUCOSE, Disp-100 strip, R-5Normal  -     Alcohol Swabs PADS; 4 TIMES DAILY Starting u 3/6/2025, Disp-100 each, R-5, Normal  -     blood glucose monitor kit and supplies; Blood glucose monitor - Currently uses one touch ultra - may substitute brand per insurance preference., Disp-1 kit, R-0, Normal  Atrial fibrillation, unspecified type (HCC)  -     apixaban (ELIQUIS) 5 MG TABS tablet; Take 1 tablet by mouth 2 times daily, Disp-180 tablet, R-1Normal  -     metoprolol succinate (TOPROL XL) 50 MG extended release tablet; TAKE 1 TABLET BY MOUTH IN THE MORNING AND IN THE EVENING, Disp-180 tablet, R-1Normal  Essential hypertension  -     irbesartan (AVAPRO) 150 MG tablet; Take 1 tablet by mouth daily, Disp-90 tablet, R-1Normal  At high risk for injury related to fall  -     External Referral To Physical Therapy  Balance problem  -     External Referral To Physical Therapy    Discussed vaccines including COVID/RSV/Flu.       Return for Medicare Annual Wellness Visit in 1 year.     Subjective   The following acute and/or chronic problems were also addressed today:  DM - GLC control is good.  A1C <7 diet controlled.  Lowest GLC seen on logs about 96.  No substantial highs.      Htn - no CV sx reported.  Tolerating meds.     Discussed Alk phos and GGT elevation - needs RUQ U/S.      GERD - controlled with lansoprazole.     Afib - rate controlled and no reports of bleeding.     Patient's complete Health Risk Assessment and screening values have been reviewed and are found in Flowsheets. The following problems were

## 2025-03-06 NOTE — TELEPHONE ENCOUNTER
Patient need RUQ U/S to evaluate gall bladder given elevations of alk phos + GGT elevation. Please help arrange.

## 2025-03-25 ENCOUNTER — RESULTS FOLLOW-UP (OUTPATIENT)
Dept: FAMILY MEDICINE CLINIC | Age: 85
End: 2025-03-25

## 2025-03-25 ENCOUNTER — HOSPITAL ENCOUNTER (OUTPATIENT)
Dept: ULTRASOUND IMAGING | Age: 85
Discharge: HOME | End: 2025-03-27
Attending: FAMILY MEDICINE
Payer: MEDICARE

## 2025-03-25 DIAGNOSIS — R74.8 ALKALINE PHOSPHATASE ELEVATION: ICD-10-CM

## 2025-03-25 DIAGNOSIS — R74.8 ELEVATED SERUM GGT LEVEL: ICD-10-CM

## 2025-03-25 PROCEDURE — 76705 ECHO EXAM OF ABDOMEN: CPT

## 2025-04-08 ENCOUNTER — TELEPHONE (OUTPATIENT)
Dept: FAMILY MEDICINE CLINIC | Age: 85
End: 2025-04-08

## 2025-04-10 RX ORDER — FUROSEMIDE 20 MG/1
20 TABLET ORAL DAILY
Qty: 30 TABLET | Refills: 0 | Status: SHIPPED | OUTPATIENT
Start: 2025-04-10

## 2025-04-10 NOTE — TELEPHONE ENCOUNTER
DIANNA: Spoke with patient, she stated she did  Eliquis and has been taking this medication. She states she is okay with the associated cost as she would not like the alternative of coumadin monitoring.

## 2025-04-10 NOTE — TELEPHONE ENCOUNTER
Name of Medication(s) Requested:  Requested Prescriptions     Pending Prescriptions Disp Refills    furosemide (LASIX) 20 MG tablet 30 tablet 2     Sig: Take 1 tablet by mouth daily       Medication is on current medication list Yes    Dosage and directions were verified? Yes    Quantity verified: 90 day supply     Pharmacy Verified?  Yes    Last Appointment:  3/6/2025    Future appts:  Future Appointments   Date Time Provider Department Center   5/12/2025 11:00 AM Danial Tovar MD [facility]   11/10/2025 11:00 AM Danial Tovar MD [facility]   2/3/2026 11:00 AM Rodrigo Jerry MD [facility]   3/12/2026  9:30 AM Danial Tovar MD [facility]        (If no appt send self scheduling link. .REFILLAPPT)  Scheduling request sent?     [] Yes  [x] No    Does patient need updated?  [] Yes  [x] No  
No

## 2025-05-12 ENCOUNTER — OFFICE VISIT (OUTPATIENT)
Dept: FAMILY MEDICINE CLINIC | Age: 85
End: 2025-05-12
Payer: MEDICARE

## 2025-05-12 VITALS
WEIGHT: 188 LBS | BODY MASS INDEX: 33.31 KG/M2 | TEMPERATURE: 98.6 F | OXYGEN SATURATION: 96 % | HEART RATE: 72 BPM | HEIGHT: 63 IN | RESPIRATION RATE: 18 BRPM | SYSTOLIC BLOOD PRESSURE: 129 MMHG | DIASTOLIC BLOOD PRESSURE: 88 MMHG

## 2025-05-12 DIAGNOSIS — K21.9 GASTROESOPHAGEAL REFLUX DISEASE WITHOUT ESOPHAGITIS: ICD-10-CM

## 2025-05-12 DIAGNOSIS — E78.2 MIXED HYPERLIPIDEMIA: Chronic | ICD-10-CM

## 2025-05-12 DIAGNOSIS — I10 ESSENTIAL HYPERTENSION: Chronic | ICD-10-CM

## 2025-05-12 DIAGNOSIS — E11.9 TYPE 2 DIABETES MELLITUS WITHOUT COMPLICATION, WITHOUT LONG-TERM CURRENT USE OF INSULIN (HCC): ICD-10-CM

## 2025-05-12 DIAGNOSIS — I48.21 PERMANENT ATRIAL FIBRILLATION (HCC): Primary | ICD-10-CM

## 2025-05-12 PROCEDURE — 1159F MED LIST DOCD IN RCRD: CPT | Performed by: FAMILY MEDICINE

## 2025-05-12 PROCEDURE — 99214 OFFICE O/P EST MOD 30 MIN: CPT | Performed by: FAMILY MEDICINE

## 2025-05-12 PROCEDURE — 3079F DIAST BP 80-89 MM HG: CPT | Performed by: FAMILY MEDICINE

## 2025-05-12 PROCEDURE — 1123F ACP DISCUSS/DSCN MKR DOCD: CPT | Performed by: FAMILY MEDICINE

## 2025-05-12 PROCEDURE — 3074F SYST BP LT 130 MM HG: CPT | Performed by: FAMILY MEDICINE

## 2025-05-12 PROCEDURE — 3044F HG A1C LEVEL LT 7.0%: CPT | Performed by: FAMILY MEDICINE

## 2025-05-12 RX ORDER — FUROSEMIDE 20 MG/1
20 TABLET ORAL DAILY
Qty: 30 TABLET | Refills: 2 | Status: SHIPPED | OUTPATIENT
Start: 2025-05-12

## 2025-05-12 RX ORDER — FUROSEMIDE 20 MG/1
20 TABLET ORAL DAILY
Qty: 90 TABLET | Refills: 1 | OUTPATIENT
Start: 2025-05-12

## 2025-05-12 RX ORDER — VIT C/B6/B5/MAGNESIUM/HERB 173 50-5-6-5MG
500 CAPSULE ORAL DAILY
COMMUNITY

## 2025-05-12 ASSESSMENT — ENCOUNTER SYMPTOMS
CHEST TIGHTNESS: 0
SHORTNESS OF BREATH: 0
ABDOMINAL PAIN: 0

## 2025-05-12 NOTE — PROGRESS NOTES
Connection and Isolation Panel [NHANES]     Frequency of Communication with Friends and Family: More than three times a week     Frequency of Social Gatherings with Friends and Family: Once a week     Attends Druze Services: More than 4 times per year     Active Member of Clubs or Organizations: Yes     Attends Club or Organization Meetings: More than 4 times per year     Marital Status:    Intimate Partner Violence: Not on file   Housing Stability: Low Risk  (3/6/2025)    Housing Stability Vital Sign     Unable to Pay for Housing in the Last Year: No     Number of Times Moved in the Last Year: 0     Homeless in the Last Year: No        Family History:       Problem Relation Age of Onset    Diabetes Mother     Heart Disease Father     Prostate Cancer Father 65    Prostate Cancer Brother 65    Heart Disease Brother        Counseled regarding above diagnosis, including possible risks and complications,  especially if left uncontrolled. Counseled regarding the possible side effects, risks, benefits and alternatives to treatment;patient and/or guardian verbalizes understanding, agrees, feels comfortable with and wishes to proceed with above treatment plan.    Call or go to EDimmediately if symptoms worsen or persist. Advised patient to call with any new medication issues, and, as applicable, read all Rx info from pharmacy to assure aware of all possible risks and side effects of medicationbefore taking.     Patient and/or guardian given opportunity to ask questions/raise concerns.  The patient verbalized comfort and understanding ofinstructions.    I encourage further reading and education about your health conditions.  Information on many health conditions is provided by theAmerican Academy of Family Physicians: https://familydoctor.org/  Please bring any questions to me at your nextvisit.    Medication List:    Current Outpatient Medications   Medication Sig Dispense Refill    turmeric 500 MG CAPS Take 1

## 2025-06-12 RX ORDER — BUMETANIDE 0.5 MG/1
0.5 TABLET ORAL DAILY
Qty: 90 TABLET | OUTPATIENT
Start: 2025-06-12

## 2025-08-05 RX ORDER — FUROSEMIDE 20 MG/1
20 TABLET ORAL DAILY
Qty: 90 TABLET | Refills: 0 | Status: SHIPPED | OUTPATIENT
Start: 2025-08-05

## 2025-09-03 ENCOUNTER — HOSPITAL ENCOUNTER (OUTPATIENT)
Dept: LAB | Age: 85
Discharge: HOME | End: 2025-09-03
Payer: MEDICARE

## 2025-09-03 DIAGNOSIS — E11.9 TYPE 2 DIABETES MELLITUS WITHOUT COMPLICATION, WITHOUT LONG-TERM CURRENT USE OF INSULIN (HCC): ICD-10-CM

## 2025-09-03 DIAGNOSIS — E78.2 MIXED HYPERLIPIDEMIA: Chronic | ICD-10-CM

## 2025-09-03 DIAGNOSIS — I10 ESSENTIAL HYPERTENSION: Chronic | ICD-10-CM

## 2025-09-03 LAB
ALBUMIN SERPL-MCNC: 3.8 G/DL (ref 3.5–5.2)
ALP SERPL-CCNC: 183 U/L (ref 35–104)
ALT SERPL-CCNC: 17 U/L (ref 0–35)
ANION GAP SERPL CALCULATED.3IONS-SCNC: 12 MMOL/L (ref 7–16)
AST SERPL-CCNC: 28 U/L (ref 0–35)
BASOPHILS # BLD: 0.06 K/UL (ref 0–0.2)
BASOPHILS NFR BLD: 1 % (ref 0–2)
BILIRUB SERPL-MCNC: 0.6 MG/DL (ref 0–1.2)
BUN SERPL-MCNC: 15 MG/DL (ref 8–23)
CALCIUM SERPL-MCNC: 9.6 MG/DL (ref 8.8–10.2)
CHLORIDE SERPL-SCNC: 101 MMOL/L (ref 98–107)
CHOLEST SERPL-MCNC: 187 MG/DL
CO2 SERPL-SCNC: 24 MMOL/L (ref 22–29)
CREAT SERPL-MCNC: 0.7 MG/DL (ref 0.5–1)
EOSINOPHIL # BLD: 0.64 K/UL (ref 0.05–0.5)
EOSINOPHILS RELATIVE PERCENT: 10 % (ref 0–6)
ERYTHROCYTE [DISTWIDTH] IN BLOOD BY AUTOMATED COUNT: 13.4 % (ref 11.5–15)
GFR, ESTIMATED: 86 ML/MIN/1.73M2
GLUCOSE SERPL-MCNC: 166 MG/DL (ref 74–99)
HBA1C MFR BLD: 7.6 % (ref 4–5.6)
HCT VFR BLD AUTO: 43.3 % (ref 34–48)
HDLC SERPL-MCNC: 36 MG/DL
HGB BLD-MCNC: 14.2 G/DL (ref 11.5–15.5)
IMM GRANULOCYTES # BLD AUTO: <0.03 K/UL (ref 0–0.58)
IMM GRANULOCYTES NFR BLD: 0 % (ref 0–5)
LDLC SERPL CALC-MCNC: 122 MG/DL
LYMPHOCYTES NFR BLD: 1.4 K/UL (ref 1.5–4)
LYMPHOCYTES RELATIVE PERCENT: 21 % (ref 20–42)
MCH RBC QN AUTO: 32.5 PG (ref 26–35)
MCHC RBC AUTO-ENTMCNC: 32.8 G/DL (ref 32–34.5)
MCV RBC AUTO: 99.1 FL (ref 80–99.9)
MONOCYTES NFR BLD: 0.65 K/UL (ref 0.1–0.95)
MONOCYTES NFR BLD: 10 % (ref 2–12)
NEUTROPHILS NFR BLD: 59 % (ref 43–80)
NEUTS SEG NFR BLD: 3.94 K/UL (ref 1.8–7.3)
PLATELET # BLD AUTO: 222 K/UL (ref 130–450)
PMV BLD AUTO: 9.7 FL (ref 7–12)
POTASSIUM SERPL-SCNC: 4.3 MMOL/L (ref 3.5–5.1)
PROT SERPL-MCNC: 7.4 G/DL (ref 6.4–8.3)
RBC # BLD AUTO: 4.37 M/UL (ref 3.5–5.5)
SODIUM SERPL-SCNC: 137 MMOL/L (ref 136–145)
TRIGL SERPL-MCNC: 144 MG/DL
TSH SERPL DL<=0.05 MIU/L-ACNC: 1.88 UIU/ML (ref 0.27–4.2)
VLDLC SERPL CALC-MCNC: 29 MG/DL
WBC OTHER # BLD: 6.7 K/UL (ref 4.5–11.5)

## 2025-09-03 PROCEDURE — 85025 COMPLETE CBC W/AUTO DIFF WBC: CPT

## 2025-09-03 PROCEDURE — 83036 HEMOGLOBIN GLYCOSYLATED A1C: CPT

## 2025-09-03 PROCEDURE — 36415 COLL VENOUS BLD VENIPUNCTURE: CPT

## 2025-09-03 PROCEDURE — 80061 LIPID PANEL: CPT

## 2025-09-03 PROCEDURE — 84443 ASSAY THYROID STIM HORMONE: CPT

## 2025-09-03 PROCEDURE — 80053 COMPREHEN METABOLIC PANEL: CPT
